# Patient Record
Sex: MALE | Race: WHITE | NOT HISPANIC OR LATINO | URBAN - METROPOLITAN AREA
[De-identification: names, ages, dates, MRNs, and addresses within clinical notes are randomized per-mention and may not be internally consistent; named-entity substitution may affect disease eponyms.]

---

## 2023-12-27 ENCOUNTER — HOSPITAL ENCOUNTER (INPATIENT)
Facility: HOSPITAL | Age: 53
LOS: 12 days | Discharge: NURSING, REHAB OR LONG-TERM CARE | End: 2024-01-08
Attending: EMERGENCY MEDICINE | Admitting: INTERNAL MEDICINE
Payer: COMMERCIAL

## 2023-12-27 ENCOUNTER — APPOINTMENT (OUTPATIENT)
Dept: GENERAL RADIOLOGY | Facility: HOSPITAL | Age: 53
End: 2023-12-27
Payer: COMMERCIAL

## 2023-12-27 ENCOUNTER — APPOINTMENT (OUTPATIENT)
Dept: CT IMAGING | Facility: HOSPITAL | Age: 53
End: 2023-12-27
Payer: COMMERCIAL

## 2023-12-27 DIAGNOSIS — I10 UNCONTROLLED HYPERTENSION: ICD-10-CM

## 2023-12-27 DIAGNOSIS — R41.841 COGNITIVE COMMUNICATION DEFICIT: ICD-10-CM

## 2023-12-27 DIAGNOSIS — I62.9 INTRACRANIAL HEMORRHAGE: Primary | ICD-10-CM

## 2023-12-27 DIAGNOSIS — Z91.199 PERSONAL HISTORY OF NONCOMPLIANCE WITH MEDICAL TREATMENT: ICD-10-CM

## 2023-12-27 DIAGNOSIS — R13.12 OROPHARYNGEAL DYSPHAGIA: ICD-10-CM

## 2023-12-27 DIAGNOSIS — G47.00 INSOMNIA, UNSPECIFIED TYPE: ICD-10-CM

## 2023-12-27 PROBLEM — F10.11 HISTORY OF ALCOHOL ABUSE: Status: ACTIVE | Noted: 2023-12-27

## 2023-12-27 PROBLEM — I61.9 ICH (INTRACEREBRAL HEMORRHAGE): Status: ACTIVE | Noted: 2023-12-27

## 2023-12-27 LAB
ALT SERPL W P-5'-P-CCNC: 40 U/L (ref 1–41)
AMPHET+METHAMPHET UR QL: NEGATIVE
AMPHETAMINES UR QL: NEGATIVE
APTT PPP: 31 SECONDS (ref 22–39)
AST SERPL-CCNC: 25 U/L (ref 1–40)
BARBITURATES UR QL SCN: NEGATIVE
BASOPHILS # BLD AUTO: 0.06 10*3/MM3 (ref 0–0.2)
BASOPHILS NFR BLD AUTO: 0.6 % (ref 0–1.5)
BENZODIAZ UR QL SCN: NEGATIVE
BUN BLDA-MCNC: 13 MG/DL (ref 8–26)
BUPRENORPHINE SERPL-MCNC: NEGATIVE NG/ML
CA-I BLDA-SCNC: 1.24 MMOL/L (ref 1.2–1.32)
CANNABINOIDS SERPL QL: NEGATIVE
CHLORIDE BLDA-SCNC: 100 MMOL/L (ref 98–109)
CO2 BLDA-SCNC: 29 MMOL/L (ref 24–29)
COCAINE UR QL: NEGATIVE
CREAT BLDA-MCNC: 1.2 MG/DL (ref 0.6–1.3)
DEPRECATED RDW RBC AUTO: 44.4 FL (ref 37–54)
EGFRCR SERPLBLD CKD-EPI 2021: 72.3 ML/MIN/1.73
EOSINOPHIL # BLD AUTO: 0.25 10*3/MM3 (ref 0–0.4)
EOSINOPHIL NFR BLD AUTO: 2.6 % (ref 0.3–6.2)
ERYTHROCYTE [DISTWIDTH] IN BLOOD BY AUTOMATED COUNT: 13.6 % (ref 12.3–15.4)
ETHANOL BLD-MCNC: <10 MG/DL (ref 0–10)
FENTANYL UR-MCNC: NEGATIVE NG/ML
GLUCOSE BLDC GLUCOMTR-MCNC: 87 MG/DL (ref 70–130)
HCT VFR BLD AUTO: 49.2 % (ref 37.5–51)
HCT VFR BLDA CALC: 50 % (ref 38–51)
HGB BLD-MCNC: 16.3 G/DL (ref 13–17.7)
HGB BLDA-MCNC: 17 G/DL (ref 12–17)
HOLD SPECIMEN: NORMAL
IMM GRANULOCYTES # BLD AUTO: 0.06 10*3/MM3 (ref 0–0.05)
IMM GRANULOCYTES NFR BLD AUTO: 0.6 % (ref 0–0.5)
INR PPP: 1.2 (ref 0.8–1.2)
LYMPHOCYTES # BLD AUTO: 1.5 10*3/MM3 (ref 0.7–3.1)
LYMPHOCYTES NFR BLD AUTO: 15.4 % (ref 19.6–45.3)
MCH RBC QN AUTO: 29.5 PG (ref 26.6–33)
MCHC RBC AUTO-ENTMCNC: 33.1 G/DL (ref 31.5–35.7)
MCV RBC AUTO: 89 FL (ref 79–97)
METHADONE UR QL SCN: NEGATIVE
MONOCYTES # BLD AUTO: 1.25 10*3/MM3 (ref 0.1–0.9)
MONOCYTES NFR BLD AUTO: 12.9 % (ref 5–12)
NEUTROPHILS NFR BLD AUTO: 6.59 10*3/MM3 (ref 1.7–7)
NEUTROPHILS NFR BLD AUTO: 67.9 % (ref 42.7–76)
NRBC BLD AUTO-RTO: 0 /100 WBC (ref 0–0.2)
NT-PROBNP SERPL-MCNC: 322.9 PG/ML (ref 0–900)
OPIATES UR QL: NEGATIVE
OSMOLALITY UR: 141 MOSM/KG (ref 300–1100)
OXYCODONE UR QL SCN: NEGATIVE
PCP UR QL SCN: NEGATIVE
PLATELET # BLD AUTO: 249 10*3/MM3 (ref 140–450)
PMV BLD AUTO: 10.4 FL (ref 6–12)
POTASSIUM BLDA-SCNC: 3.5 MMOL/L (ref 3.5–4.9)
PROTHROMBIN TIME: 13.8 SECONDS (ref 12.8–15.2)
RBC # BLD AUTO: 5.53 10*6/MM3 (ref 4.14–5.8)
SODIUM BLD-SCNC: 141 MMOL/L (ref 138–146)
TRICYCLICS UR QL SCN: NEGATIVE
TROPONIN T SERPL HS-MCNC: 17 NG/L
WBC NRBC COR # BLD AUTO: 9.71 10*3/MM3 (ref 3.4–10.8)
WHOLE BLOOD HOLD COAG: NORMAL
WHOLE BLOOD HOLD SPECIMEN: NORMAL

## 2023-12-27 PROCEDURE — 85014 HEMATOCRIT: CPT

## 2023-12-27 PROCEDURE — 70496 CT ANGIOGRAPHY HEAD: CPT

## 2023-12-27 PROCEDURE — 25010000002 METHYLPREDNISOLONE PER 125 MG: Performed by: EMERGENCY MEDICINE

## 2023-12-27 PROCEDURE — 85610 PROTHROMBIN TIME: CPT

## 2023-12-27 PROCEDURE — 82330 ASSAY OF CALCIUM: CPT | Performed by: INTERNAL MEDICINE

## 2023-12-27 PROCEDURE — 71045 X-RAY EXAM CHEST 1 VIEW: CPT

## 2023-12-27 PROCEDURE — 83935 ASSAY OF URINE OSMOLALITY: CPT

## 2023-12-27 PROCEDURE — 80053 COMPREHEN METABOLIC PANEL: CPT | Performed by: EMERGENCY MEDICINE

## 2023-12-27 PROCEDURE — 99291 CRITICAL CARE FIRST HOUR: CPT | Performed by: INTERNAL MEDICINE

## 2023-12-27 PROCEDURE — 99291 CRITICAL CARE FIRST HOUR: CPT

## 2023-12-27 PROCEDURE — 70498 CT ANGIOGRAPHY NECK: CPT

## 2023-12-27 PROCEDURE — 83036 HEMOGLOBIN GLYCOSYLATED A1C: CPT

## 2023-12-27 PROCEDURE — 99222 1ST HOSP IP/OBS MODERATE 55: CPT

## 2023-12-27 PROCEDURE — 85025 COMPLETE CBC W/AUTO DIFF WBC: CPT | Performed by: EMERGENCY MEDICINE

## 2023-12-27 PROCEDURE — 84484 ASSAY OF TROPONIN QUANT: CPT | Performed by: EMERGENCY MEDICINE

## 2023-12-27 PROCEDURE — 93005 ELECTROCARDIOGRAM TRACING: CPT | Performed by: EMERGENCY MEDICINE

## 2023-12-27 PROCEDURE — 85730 THROMBOPLASTIN TIME PARTIAL: CPT | Performed by: EMERGENCY MEDICINE

## 2023-12-27 PROCEDURE — 25510000001 IOPAMIDOL PER 1 ML: Performed by: EMERGENCY MEDICINE

## 2023-12-27 PROCEDURE — 83735 ASSAY OF MAGNESIUM: CPT | Performed by: NURSE PRACTITIONER

## 2023-12-27 PROCEDURE — 83930 ASSAY OF BLOOD OSMOLALITY: CPT

## 2023-12-27 PROCEDURE — 83880 ASSAY OF NATRIURETIC PEPTIDE: CPT

## 2023-12-27 PROCEDURE — 25010000002 DIPHENHYDRAMINE PER 50 MG: Performed by: EMERGENCY MEDICINE

## 2023-12-27 PROCEDURE — 80047 BASIC METABLC PNL IONIZED CA: CPT

## 2023-12-27 PROCEDURE — 80061 LIPID PANEL: CPT

## 2023-12-27 PROCEDURE — 84100 ASSAY OF PHOSPHORUS: CPT | Performed by: NURSE PRACTITIONER

## 2023-12-27 PROCEDURE — 70450 CT HEAD/BRAIN W/O DYE: CPT

## 2023-12-27 PROCEDURE — 85027 COMPLETE CBC AUTOMATED: CPT | Performed by: NURSE PRACTITIONER

## 2023-12-27 PROCEDURE — 25810000003 SODIUM CHLORIDE 0.9 % SOLUTION 250 ML FLEX CONT: Performed by: EMERGENCY MEDICINE

## 2023-12-27 PROCEDURE — 80307 DRUG TEST PRSMV CHEM ANLYZR: CPT | Performed by: EMERGENCY MEDICINE

## 2023-12-27 PROCEDURE — 82077 ASSAY SPEC XCP UR&BREATH IA: CPT | Performed by: EMERGENCY MEDICINE

## 2023-12-27 PROCEDURE — 36415 COLL VENOUS BLD VENIPUNCTURE: CPT

## 2023-12-27 RX ORDER — DIPHENHYDRAMINE HYDROCHLORIDE 50 MG/ML
50 INJECTION INTRAMUSCULAR; INTRAVENOUS ONCE
Status: COMPLETED | OUTPATIENT
Start: 2023-12-27 | End: 2023-12-27

## 2023-12-27 RX ORDER — FAMOTIDINE 10 MG/ML
20 INJECTION, SOLUTION INTRAVENOUS ONCE
Status: COMPLETED | OUTPATIENT
Start: 2023-12-27 | End: 2023-12-27

## 2023-12-27 RX ORDER — SODIUM CHLORIDE 0.9 % (FLUSH) 0.9 %
10 SYRINGE (ML) INJECTION AS NEEDED
Status: DISCONTINUED | OUTPATIENT
Start: 2023-12-27 | End: 2023-12-28

## 2023-12-27 RX ORDER — LABETALOL HYDROCHLORIDE 5 MG/ML
20 INJECTION, SOLUTION INTRAVENOUS EVERY 6 HOURS PRN
Status: DISCONTINUED | OUTPATIENT
Start: 2023-12-27 | End: 2024-01-04

## 2023-12-27 RX ORDER — 3% SODIUM CHLORIDE 3 G/100ML
20 INJECTION, SOLUTION INTRAVENOUS CONTINUOUS
Status: DISCONTINUED | OUTPATIENT
Start: 2023-12-28 | End: 2023-12-28

## 2023-12-27 RX ORDER — THIAMINE HYDROCHLORIDE 100 MG/ML
100 INJECTION, SOLUTION INTRAMUSCULAR; INTRAVENOUS DAILY
Status: COMPLETED | OUTPATIENT
Start: 2023-12-28 | End: 2023-12-30

## 2023-12-27 RX ORDER — LABETALOL HYDROCHLORIDE 5 MG/ML
20 INJECTION, SOLUTION INTRAVENOUS ONCE
Status: COMPLETED | OUTPATIENT
Start: 2023-12-27 | End: 2023-12-27

## 2023-12-27 RX ORDER — LISINOPRIL 10 MG/1
10 TABLET ORAL
Status: DISCONTINUED | OUTPATIENT
Start: 2023-12-28 | End: 2023-12-29

## 2023-12-27 RX ORDER — METHYLPREDNISOLONE SODIUM SUCCINATE 125 MG/2ML
125 INJECTION, POWDER, LYOPHILIZED, FOR SOLUTION INTRAMUSCULAR; INTRAVENOUS ONCE
Status: COMPLETED | OUTPATIENT
Start: 2023-12-27 | End: 2023-12-27

## 2023-12-27 RX ORDER — SODIUM CHLORIDE 9 MG/ML
40 INJECTION, SOLUTION INTRAVENOUS AS NEEDED
Status: DISCONTINUED | OUTPATIENT
Start: 2023-12-27 | End: 2024-01-08 | Stop reason: HOSPADM

## 2023-12-27 RX ORDER — SODIUM CHLORIDE 0.9 % (FLUSH) 0.9 %
10 SYRINGE (ML) INJECTION EVERY 12 HOURS SCHEDULED
Status: DISCONTINUED | OUTPATIENT
Start: 2023-12-27 | End: 2023-12-28

## 2023-12-27 RX ORDER — NICOTINE 21 MG/24HR
1 PATCH, TRANSDERMAL 24 HOURS TRANSDERMAL
Status: DISCONTINUED | OUTPATIENT
Start: 2023-12-28 | End: 2024-01-08 | Stop reason: HOSPADM

## 2023-12-27 RX ORDER — FAMOTIDINE 10 MG/ML
INJECTION, SOLUTION INTRAVENOUS
Status: COMPLETED
Start: 2023-12-27 | End: 2023-12-27

## 2023-12-27 RX ADMIN — Medication 10 ML: at 23:01

## 2023-12-27 RX ADMIN — IOPAMIDOL 75 ML: 755 INJECTION, SOLUTION INTRAVENOUS at 21:24

## 2023-12-27 RX ADMIN — Medication 20 MG: at 21:41

## 2023-12-27 RX ADMIN — METHYLPREDNISOLONE SODIUM SUCCINATE 125 MG: 125 INJECTION INTRAMUSCULAR; INTRAVENOUS at 21:40

## 2023-12-27 RX ADMIN — NICARDIPINE HYDROCHLORIDE 5 MG/HR: 25 INJECTION, SOLUTION INTRAVENOUS at 21:40

## 2023-12-27 RX ADMIN — DIPHENHYDRAMINE HYDROCHLORIDE 50 MG: 50 INJECTION INTRAMUSCULAR; INTRAVENOUS at 21:40

## 2023-12-27 RX ADMIN — FAMOTIDINE 20 MG: 10 INJECTION, SOLUTION INTRAVENOUS at 21:39

## 2023-12-27 NOTE — LETTER
EMS Transport Request  For use at [facility], [place], [place], [place], and [place] only   Patient Name: Chano Rees : 1970   Weight:112 kg (247 lb 6.4 oz) Pick-up Location: [facility] BLS/ALS: BLS/ALS: BLS   Insurance: [employer] EXCHANGE Auth End Date:    Pre-Cert #: D/C Summary complete:    Destination: Other [facility]   Contact Precautions: None   Equipment (O2, Fluids, etc.): None   Arrive By Date/Time: 2024 ASAP, [facility] wants early Stretcher/WC: Stretcher   CM Requesting: Monica Vick RN Ext: [phone]   Notes/Medical Necessity: 2 person max assist, weakness, pain, fall risk     ______________________________________________________________________    *Only 2 patient bags OR 1 carry-on size bag are permitted.  Wheelchairs and walkers CANNOT transported with the patient. Acknowledge: Yes

## 2023-12-28 ENCOUNTER — APPOINTMENT (OUTPATIENT)
Dept: CT IMAGING | Facility: HOSPITAL | Age: 53
End: 2023-12-28
Payer: COMMERCIAL

## 2023-12-28 ENCOUNTER — APPOINTMENT (OUTPATIENT)
Dept: CARDIOLOGY | Facility: HOSPITAL | Age: 53
End: 2023-12-28
Payer: COMMERCIAL

## 2023-12-28 ENCOUNTER — APPOINTMENT (OUTPATIENT)
Dept: MRI IMAGING | Facility: HOSPITAL | Age: 53
End: 2023-12-28
Payer: COMMERCIAL

## 2023-12-28 LAB
ALBUMIN SERPL-MCNC: 4.3 G/DL (ref 3.5–5.2)
ALBUMIN/GLOB SERPL: 1.1 G/DL
ALP SERPL-CCNC: 106 U/L (ref 39–117)
ALT SERPL W P-5'-P-CCNC: 41 U/L (ref 1–41)
ANION GAP SERPL CALCULATED.3IONS-SCNC: 13 MMOL/L (ref 5–15)
ANION GAP SERPL CALCULATED.3IONS-SCNC: 13 MMOL/L (ref 5–15)
ANION GAP SERPL CALCULATED.3IONS-SCNC: 17 MMOL/L (ref 5–15)
ANION GAP SERPL CALCULATED.3IONS-SCNC: 17 MMOL/L (ref 5–15)
AST SERPL-CCNC: 28 U/L (ref 1–40)
BH CV ECHO MEAS - AO MAX PG: 10.2 MMHG
BH CV ECHO MEAS - AO MEAN PG: 5 MMHG
BH CV ECHO MEAS - AO ROOT DIAM: 3.7 CM
BH CV ECHO MEAS - AO V2 MAX: 160 CM/SEC
BH CV ECHO MEAS - AO V2 VTI: 30.6 CM
BH CV ECHO MEAS - AVA(I,D): 2.2 CM2
BH CV ECHO MEAS - EDV(CUBED): 175.6 ML
BH CV ECHO MEAS - EDV(MOD-SP2): 153 ML
BH CV ECHO MEAS - EDV(MOD-SP4): 158 ML
BH CV ECHO MEAS - EF(MOD-BP): 55.6 %
BH CV ECHO MEAS - EF(MOD-SP2): 50.5 %
BH CV ECHO MEAS - EF(MOD-SP4): 62.3 %
BH CV ECHO MEAS - ESV(CUBED): 54.9 ML
BH CV ECHO MEAS - ESV(MOD-SP2): 75.7 ML
BH CV ECHO MEAS - ESV(MOD-SP4): 59.5 ML
BH CV ECHO MEAS - FS: 32.1 %
BH CV ECHO MEAS - IVS/LVPW: 1 CM
BH CV ECHO MEAS - IVSD: 1.1 CM
BH CV ECHO MEAS - LA DIMENSION: 3.6 CM
BH CV ECHO MEAS - LAT PEAK E' VEL: 13.6 CM/SEC
BH CV ECHO MEAS - LV MASS(C)D: 249.3 GRAMS
BH CV ECHO MEAS - LV MAX PG: 3.4 MMHG
BH CV ECHO MEAS - LV MEAN PG: 2 MMHG
BH CV ECHO MEAS - LV V1 MAX: 92.2 CM/SEC
BH CV ECHO MEAS - LV V1 VTI: 17.7 CM
BH CV ECHO MEAS - LVIDD: 5.6 CM
BH CV ECHO MEAS - LVIDS: 3.8 CM
BH CV ECHO MEAS - LVOT AREA: 3.8 CM2
BH CV ECHO MEAS - LVOT DIAM: 2.2 CM
BH CV ECHO MEAS - LVPWD: 1.1 CM
BH CV ECHO MEAS - MED PEAK E' VEL: 18.5 CM/SEC
BH CV ECHO MEAS - MV A MAX VEL: 143 CM/SEC
BH CV ECHO MEAS - MV DEC SLOPE: 556 CM/SEC2
BH CV ECHO MEAS - MV DEC TIME: 0.16 SEC
BH CV ECHO MEAS - MV E MAX VEL: 103 CM/SEC
BH CV ECHO MEAS - MV E/A: 0.72
BH CV ECHO MEAS - MV MAX PG: 10.3 MMHG
BH CV ECHO MEAS - MV MEAN PG: 4.7 MMHG
BH CV ECHO MEAS - MV P1/2T: 60.6 MSEC
BH CV ECHO MEAS - MV V2 VTI: 32.8 CM
BH CV ECHO MEAS - MVA(P1/2T): 3.6 CM2
BH CV ECHO MEAS - MVA(VTI): 2.05 CM2
BH CV ECHO MEAS - PA ACC TIME: 0.12 SEC
BH CV ECHO MEAS - PA V2 MAX: 140 CM/SEC
BH CV ECHO MEAS - SV(LVOT): 67.3 ML
BH CV ECHO MEAS - SV(MOD-SP2): 77.3 ML
BH CV ECHO MEAS - SV(MOD-SP4): 98.5 ML
BH CV ECHO MEAS - TAPSE (>1.6): 2.31 CM
BH CV ECHO MEASUREMENTS AVERAGE E/E' RATIO: 6.42
BH CV ECHO SHUNT ASSESSMENT PERFORMED (HIDDEN SCRIPTING): 1
BH CV VAS BP LEFT ARM: NORMAL MMHG
BH CV XLRA - TDI S': 23.8 CM/SEC
BILIRUB SERPL-MCNC: 0.5 MG/DL (ref 0–1.2)
BUN SERPL-MCNC: 12 MG/DL (ref 6–20)
BUN SERPL-MCNC: 12 MG/DL (ref 6–20)
BUN SERPL-MCNC: 17 MG/DL (ref 6–20)
BUN SERPL-MCNC: 21 MG/DL (ref 6–20)
BUN/CREAT SERPL: 11.8 (ref 7–25)
BUN/CREAT SERPL: 13.7 (ref 7–25)
BUN/CREAT SERPL: 9.4 (ref 7–25)
BUN/CREAT SERPL: 9.4 (ref 7–25)
CA-I SERPL ISE-MCNC: 1.33 MMOL/L (ref 1.12–1.32)
CALCIUM SPEC-SCNC: 9.6 MG/DL (ref 8.6–10.5)
CALCIUM SPEC-SCNC: 9.7 MG/DL (ref 8.6–10.5)
CALCIUM SPEC-SCNC: 9.8 MG/DL (ref 8.6–10.5)
CALCIUM SPEC-SCNC: 9.8 MG/DL (ref 8.6–10.5)
CHLORIDE SERPL-SCNC: 104 MMOL/L (ref 98–107)
CHLORIDE SERPL-SCNC: 104 MMOL/L (ref 98–107)
CHLORIDE SERPL-SCNC: 105 MMOL/L (ref 98–107)
CHLORIDE SERPL-SCNC: 107 MMOL/L (ref 98–107)
CHOLEST SERPL-MCNC: 203 MG/DL (ref 0–200)
CO2 SERPL-SCNC: 17 MMOL/L (ref 22–29)
CO2 SERPL-SCNC: 18 MMOL/L (ref 22–29)
CO2 SERPL-SCNC: 22 MMOL/L (ref 22–29)
CO2 SERPL-SCNC: 22 MMOL/L (ref 22–29)
CREAT SERPL-MCNC: 1.28 MG/DL (ref 0.76–1.27)
CREAT SERPL-MCNC: 1.28 MG/DL (ref 0.76–1.27)
CREAT SERPL-MCNC: 1.44 MG/DL (ref 0.76–1.27)
CREAT SERPL-MCNC: 1.53 MG/DL (ref 0.76–1.27)
DEPRECATED RDW RBC AUTO: 43 FL (ref 37–54)
EGFRCR SERPLBLD CKD-EPI 2021: 54 ML/MIN/1.73
EGFRCR SERPLBLD CKD-EPI 2021: 58.1 ML/MIN/1.73
EGFRCR SERPLBLD CKD-EPI 2021: 66.9 ML/MIN/1.73
EGFRCR SERPLBLD CKD-EPI 2021: 66.9 ML/MIN/1.73
ERYTHROCYTE [DISTWIDTH] IN BLOOD BY AUTOMATED COUNT: 13.6 % (ref 12.3–15.4)
GLOBULIN UR ELPH-MCNC: 4 GM/DL
GLUCOSE BLDC GLUCOMTR-MCNC: 119 MG/DL (ref 70–130)
GLUCOSE BLDC GLUCOMTR-MCNC: 133 MG/DL (ref 70–130)
GLUCOSE BLDC GLUCOMTR-MCNC: 135 MG/DL (ref 70–130)
GLUCOSE BLDC GLUCOMTR-MCNC: 148 MG/DL (ref 70–130)
GLUCOSE BLDC GLUCOMTR-MCNC: 149 MG/DL (ref 70–130)
GLUCOSE SERPL-MCNC: 133 MG/DL (ref 65–99)
GLUCOSE SERPL-MCNC: 133 MG/DL (ref 65–99)
GLUCOSE SERPL-MCNC: 163 MG/DL (ref 65–99)
GLUCOSE SERPL-MCNC: 165 MG/DL (ref 65–99)
HBA1C MFR BLD: 5.3 % (ref 4.8–5.6)
HCT VFR BLD AUTO: 50 % (ref 37.5–51)
HDLC SERPL-MCNC: 38 MG/DL (ref 40–60)
HGB BLD-MCNC: 17.1 G/DL (ref 13–17.7)
LDLC SERPL CALC-MCNC: 143 MG/DL (ref 0–100)
LDLC/HDLC SERPL: 3.71 {RATIO}
LEFT ATRIUM VOLUME INDEX: 22 ML/M2
MAGNESIUM SERPL-MCNC: 2.3 MG/DL (ref 1.6–2.6)
MCH RBC QN AUTO: 29.7 PG (ref 26.6–33)
MCHC RBC AUTO-ENTMCNC: 34.2 G/DL (ref 31.5–35.7)
MCV RBC AUTO: 87 FL (ref 79–97)
OSMOLALITY SERPL: 307 MOSM/KG (ref 275–295)
OSMOLALITY SERPL: 310 MOSM/KG (ref 275–295)
OSMOLALITY SERPL: 311 MOSM/KG (ref 275–295)
OSMOLALITY SERPL: 312 MOSM/KG (ref 275–295)
PHOSPHATE SERPL-MCNC: 1.5 MG/DL (ref 2.5–4.5)
PHOSPHATE SERPL-MCNC: 3.6 MG/DL (ref 2.5–4.5)
PLATELET # BLD AUTO: 272 10*3/MM3 (ref 140–450)
PMV BLD AUTO: 10.3 FL (ref 6–12)
POTASSIUM SERPL-SCNC: 4 MMOL/L (ref 3.5–5.2)
POTASSIUM SERPL-SCNC: 4 MMOL/L (ref 3.5–5.2)
POTASSIUM SERPL-SCNC: 4.3 MMOL/L (ref 3.5–5.2)
POTASSIUM SERPL-SCNC: 4.6 MMOL/L (ref 3.5–5.2)
PROT SERPL-MCNC: 8.3 G/DL (ref 6–8.5)
QT INTERVAL: 430 MS
QTC INTERVAL: 517 MS
RBC # BLD AUTO: 5.75 10*6/MM3 (ref 4.14–5.8)
SODIUM SERPL-SCNC: 139 MMOL/L (ref 136–145)
SODIUM SERPL-SCNC: 142 MMOL/L (ref 136–145)
SODIUM SERPL-SCNC: 142 MMOL/L (ref 136–145)
TRIGL SERPL-MCNC: 121 MG/DL (ref 0–150)
VLDLC SERPL-MCNC: 22 MG/DL (ref 5–40)
WBC NRBC COR # BLD AUTO: 11.19 10*3/MM3 (ref 3.4–10.8)

## 2023-12-28 PROCEDURE — 82948 REAGENT STRIP/BLOOD GLUCOSE: CPT

## 2023-12-28 PROCEDURE — 70450 CT HEAD/BRAIN W/O DYE: CPT

## 2023-12-28 PROCEDURE — 70551 MRI BRAIN STEM W/O DYE: CPT

## 2023-12-28 PROCEDURE — 92610 EVALUATE SWALLOWING FUNCTION: CPT

## 2023-12-28 PROCEDURE — 83930 ASSAY OF BLOOD OSMOLALITY: CPT

## 2023-12-28 PROCEDURE — 99233 SBSQ HOSP IP/OBS HIGH 50: CPT | Performed by: PSYCHIATRY & NEUROLOGY

## 2023-12-28 PROCEDURE — 25010000002 THIAMINE PER 100 MG: Performed by: INTERNAL MEDICINE

## 2023-12-28 PROCEDURE — 84295 ASSAY OF SERUM SODIUM: CPT

## 2023-12-28 PROCEDURE — 99253 IP/OBS CNSLTJ NEW/EST LOW 45: CPT | Performed by: PHYSICIAN ASSISTANT

## 2023-12-28 PROCEDURE — 25010000002 PHYTONADIONE 10 MG/ML SOLUTION 1 ML AMPULE: Performed by: PSYCHIATRY & NEUROLOGY

## 2023-12-28 PROCEDURE — 97166 OT EVAL MOD COMPLEX 45 MIN: CPT

## 2023-12-28 PROCEDURE — C1751 CATH, INF, PER/CENT/MIDLINE: HCPCS

## 2023-12-28 PROCEDURE — 84100 ASSAY OF PHOSPHORUS: CPT | Performed by: NURSE PRACTITIONER

## 2023-12-28 PROCEDURE — C1894 INTRO/SHEATH, NON-LASER: HCPCS

## 2023-12-28 PROCEDURE — 93306 TTE W/DOPPLER COMPLETE: CPT | Performed by: INTERNAL MEDICINE

## 2023-12-28 PROCEDURE — 97162 PT EVAL MOD COMPLEX 30 MIN: CPT

## 2023-12-28 PROCEDURE — 99232 SBSQ HOSP IP/OBS MODERATE 35: CPT | Performed by: INTERNAL MEDICINE

## 2023-12-28 PROCEDURE — 25810000003 SODIUM CHLORIDE 0.9 % SOLUTION 250 ML FLEX CONT: Performed by: EMERGENCY MEDICINE

## 2023-12-28 PROCEDURE — 25810000003 SODIUM CHLORIDE 3 % SOLUTION

## 2023-12-28 PROCEDURE — 93306 TTE W/DOPPLER COMPLETE: CPT

## 2023-12-28 PROCEDURE — 80048 BASIC METABOLIC PNL TOTAL CA: CPT

## 2023-12-28 PROCEDURE — 92523 SPEECH SOUND LANG COMPREHEN: CPT

## 2023-12-28 PROCEDURE — 25810000003 SODIUM CHLORIDE 0.9 % SOLUTION: Performed by: NURSE PRACTITIONER

## 2023-12-28 PROCEDURE — 05HY33Z INSERTION OF INFUSION DEVICE INTO UPPER VEIN, PERCUTANEOUS APPROACH: ICD-10-PCS

## 2023-12-28 RX ORDER — LORAZEPAM 1 MG/1
2 TABLET ORAL EVERY 6 HOURS
Status: COMPLETED | OUTPATIENT
Start: 2023-12-28 | End: 2023-12-29

## 2023-12-28 RX ORDER — MIDAZOLAM HYDROCHLORIDE 1 MG/ML
2 INJECTION INTRAMUSCULAR; INTRAVENOUS
Status: DISCONTINUED | OUTPATIENT
Start: 2023-12-28 | End: 2023-12-29

## 2023-12-28 RX ORDER — LORAZEPAM 1 MG/1
2 TABLET ORAL
Status: DISCONTINUED | OUTPATIENT
Start: 2023-12-28 | End: 2023-12-29

## 2023-12-28 RX ORDER — LORAZEPAM 1 MG/1
1 TABLET ORAL
Status: DISCONTINUED | OUTPATIENT
Start: 2023-12-28 | End: 2023-12-29

## 2023-12-28 RX ORDER — LORAZEPAM 1 MG/1
1 TABLET ORAL EVERY 6 HOURS
Status: DISCONTINUED | OUTPATIENT
Start: 2023-12-29 | End: 2023-12-29

## 2023-12-28 RX ORDER — SODIUM CHLORIDE 0.9 % (FLUSH) 0.9 %
10 SYRINGE (ML) INJECTION AS NEEDED
Status: DISCONTINUED | OUTPATIENT
Start: 2023-12-28 | End: 2024-01-08 | Stop reason: HOSPADM

## 2023-12-28 RX ORDER — SODIUM CHLORIDE 0.9 % (FLUSH) 0.9 %
20 SYRINGE (ML) INJECTION AS NEEDED
Status: DISCONTINUED | OUTPATIENT
Start: 2023-12-28 | End: 2024-01-01

## 2023-12-28 RX ORDER — SODIUM CHLORIDE 0.9 % (FLUSH) 0.9 %
10 SYRINGE (ML) INJECTION EVERY 12 HOURS SCHEDULED
Status: DISCONTINUED | OUTPATIENT
Start: 2023-12-28 | End: 2024-01-08 | Stop reason: HOSPADM

## 2023-12-28 RX ORDER — FENTANYL/ROPIVACAINE/NS/PF 2-625MCG/1
15 PLASTIC BAG, INJECTION (ML) EPIDURAL
Status: COMPLETED | OUTPATIENT
Start: 2023-12-28 | End: 2023-12-28

## 2023-12-28 RX ADMIN — NICARDIPINE HYDROCHLORIDE 5 MG/HR: 25 INJECTION, SOLUTION INTRAVENOUS at 18:48

## 2023-12-28 RX ADMIN — LORAZEPAM 2 MG: 1 TABLET ORAL at 17:59

## 2023-12-28 RX ADMIN — LORAZEPAM 2 MG: 1 TABLET ORAL at 11:15

## 2023-12-28 RX ADMIN — Medication 10 ML: at 20:07

## 2023-12-28 RX ADMIN — NICARDIPINE HYDROCHLORIDE 10 MG/HR: 25 INJECTION, SOLUTION INTRAVENOUS at 15:53

## 2023-12-28 RX ADMIN — SODIUM CHLORIDE 20 ML/HR: 3 INJECTION, SOLUTION INTRAVENOUS at 18:48

## 2023-12-28 RX ADMIN — THIAMINE HYDROCHLORIDE 100 MG: 100 INJECTION, SOLUTION INTRAMUSCULAR; INTRAVENOUS at 08:40

## 2023-12-28 RX ADMIN — LABETALOL HYDROCHLORIDE 20 MG: 5 INJECTION INTRAVENOUS at 00:53

## 2023-12-28 RX ADMIN — Medication 10 ML: at 11:16

## 2023-12-28 RX ADMIN — NICARDIPINE HYDROCHLORIDE 10 MG/HR: 25 INJECTION, SOLUTION INTRAVENOUS at 04:08

## 2023-12-28 RX ADMIN — Medication 1 TABLET: at 11:16

## 2023-12-28 RX ADMIN — FOLIC ACID 1 MG: 5 INJECTION, SOLUTION INTRAMUSCULAR; INTRAVENOUS; SUBCUTANEOUS at 08:40

## 2023-12-28 RX ADMIN — METOPROLOL TARTRATE 25 MG: 25 TABLET, FILM COATED ORAL at 20:06

## 2023-12-28 RX ADMIN — NICARDIPINE HYDROCHLORIDE 15 MG/HR: 25 INJECTION, SOLUTION INTRAVENOUS at 00:18

## 2023-12-28 RX ADMIN — NICARDIPINE HYDROCHLORIDE 10 MG/HR: 25 INJECTION, SOLUTION INTRAVENOUS at 06:17

## 2023-12-28 RX ADMIN — LISINOPRIL 10 MG: 10 TABLET ORAL at 11:14

## 2023-12-28 RX ADMIN — POTASSIUM PHOSPHATE, MONOBASIC POTASSIUM PHOSPHATE, DIBASIC 15 MMOL: 224; 236 INJECTION, SOLUTION, CONCENTRATE INTRAVENOUS at 05:32

## 2023-12-28 RX ADMIN — NICARDIPINE HYDROCHLORIDE 10 MG/HR: 25 INJECTION, SOLUTION INTRAVENOUS at 09:34

## 2023-12-28 RX ADMIN — Medication 10 ML: at 08:40

## 2023-12-28 RX ADMIN — PHYTONADIONE 10 MG: 10 INJECTION, EMULSION INTRAMUSCULAR; INTRAVENOUS; SUBCUTANEOUS at 00:01

## 2023-12-28 RX ADMIN — SODIUM CHLORIDE 20 ML/HR: 3 INJECTION, SOLUTION INTRAVENOUS at 00:01

## 2023-12-28 RX ADMIN — NICARDIPINE HYDROCHLORIDE 15 MG/HR: 25 INJECTION, SOLUTION INTRAVENOUS at 02:02

## 2023-12-28 RX ADMIN — NICARDIPINE HYDROCHLORIDE 10 MG/HR: 25 INJECTION, SOLUTION INTRAVENOUS at 13:34

## 2023-12-28 RX ADMIN — Medication 1 PATCH: at 08:40

## 2023-12-28 RX ADMIN — POTASSIUM PHOSPHATE, MONOBASIC POTASSIUM PHOSPHATE, DIBASIC 15 MMOL: 224; 236 INJECTION, SOLUTION, CONCENTRATE INTRAVENOUS at 02:06

## 2023-12-28 NOTE — CONSULTS
Diabetes Education    Patient Name:  Chano Rees  YOB: 1970  MRN: [id number]  Admit Date:  12/27/2023        Order criteria not met for diabetes education consult. Current A1c is 5.3, noted during chart review. Pt has no history of DM and no home meds for DM. Thank you.      Electronically signed by:  Laquita Landa RN  12/28/23 08:04 EST

## 2023-12-28 NOTE — THERAPY EVALUATION
Patient Name: Chano Rees  : 1970    MRN: [id number]                              Today's Date: 2023       Admit Date: 2023    Visit Dx:     ICD-10-CM ICD-9-CM   1. Intracranial hemorrhage  I62.9 432.9   2. Uncontrolled hypertension  I10 401.9   3. Personal history of noncompliance with medical treatment  Z91.199 V15.81   4. Cognitive communication deficit  R41.841 799.52     Patient Active Problem List   Diagnosis    Rt Thalamic ICH    Hypertension    Non-compliance    History of alcohol abuse     Past Medical History:   Diagnosis Date    Hypertension      Past Surgical History:   Procedure Laterality Date    ANKLE SURGERY Left       General Information       Row Name 23 142          OT Time and Intention    Document Type evaluation  -AN     Mode of Treatment occupational therapy  -AN       Row Name 23 [facility]          General Information    Patient Profile Reviewed yes  -AN     Prior Level of Function independent:;all household mobility;community mobility;gait;work;ADL's  Pt works full time as a   -AN     Existing Precautions/Restrictions fall;other (see comments)  L neglect, L sided weakness (UE>LE), L visual deficit, poor safety awareness  -AN     Barriers to Rehab medically complex;cognitive status;visual deficit  -AN       Row Name 23 142          Living Environment    People in Home significant other  -AN       Row Name 23 142          Home Main Entrance    Number of Stairs, Main Entrance none  -AN       Row Name 23 142          Stairs Within Home, Primary    Number of Stairs, Within Home, Primary none  -AN       Row Name 23 142          Cognition    Orientation Status (Cognition) oriented x 4  -AN       Row Name 23 142          Safety Issues, Functional Mobility    Safety Issues Affecting Function (Mobility) safety precautions follow-through/compliance;sequencing abilities;safety precaution awareness;impulsivity;awareness of  need for assistance;ability to follow commands;insight into deficits/self-awareness;judgment;problem-solving  -AN     Impairments Affecting Function (Mobility) balance;cognition;coordination;endurance/activity tolerance;motor control;motor planning;postural/trunk control;range of motion (ROM);strength;visual/perceptual  -AN     Cognitive Impairments, Mobility Safety/Performance awareness, need for assistance;attention;problem-solving/reasoning;safety precaution awareness;impulsivity;insight into deficits/self-awareness;safety precaution follow-through;judgment;sequencing abilities  -AN     Comment, Safety Issues/Impairments (Mobility) Pt unaware of L sided deficits and impulsive  -AN               User Key  (r) = Recorded By, (t) = Taken By, (c) = Cosigned By      Initials Name Provider Type    Princess Nolan OT Occupational Therapist                     Mobility/ADL's       Row Name 12/28/23 1504          Bed Mobility    Bed Mobility supine-sit  -AN     Supine-Sit [place] (Bed Mobility) verbal cues;moderate assist (50% patient effort);2 person assist;nonverbal cues (demo/gesture)  -AN     Bed Mobility, Safety Issues cognitive deficits limit understanding;decreased use of arms for pushing/pulling;impaired trunk control for bed mobility  -AN     Assistive Device (Bed Mobility) head of bed elevated;bed rails  -AN     Comment, (Bed Mobility) Pt required assist at trunk and BLE with cues for sequencing of steps. Pt demo'd strong posterior/L lean once sitting EOB requiring max cues to correct  -AN       Row Name 12/28/23 1504          Transfers    Transfers sit-stand transfer;stand-sit transfer  -AN     Comment, (Transfers) Pt attempting to stand prior to instruction, requiring b/l knee blocking, and max cues to correct L lean. LUE very uncoordinated throughout  -AN       Row Name 12/28/23 1504          Sit-Stand Transfer    Sit-Stand [place] (Transfers) maximum assist (25% patient effort);2 person  assist;verbal cues;nonverbal cues (demo/gesture)  -AN     Assistive Device (Sit-Stand Transfers) other (see comments)  UE support  -AN       Row Name 12/28/23 1504          Stand-Sit Transfer    Stand-Sit [place] (Transfers) verbal cues;nonverbal cues (demo/gesture);maximum assist (25% patient effort);2 person assist  -AN     Assistive Device (Stand-Sit Transfers) other (see comments)  UE support  -AN       Row Name 12/28/23 1504          Functional Mobility    Functional Mobility- Ind. Level unable to perform  -AN       Row Name 12/28/23 1504          Activities of Daily Living    BADL Assessment/Intervention upper body dressing;lower body dressing  -AN       Row Name 12/28/23 1504          Upper Body Dressing Assessment/Training    [place] Level (Upper Body Dressing) don;doff;moderate assist (50% patient effort)  gown  -AN     Position (Upper Body Dressing) supine  -AN     Comment, (Upper Body Dressing) Mod A to insert LUE due to weakness and poor coordination  -AN       Row Name 12/28/23 1504          Lower Body Dressing Assessment/Training    [place] Level (Lower Body Dressing) don;socks;dependent (less than 25% patient effort)  -AN     Position (Lower Body Dressing) sitting up in bed  -AN               User Key  (r) = Recorded By, (t) = Taken By, (c) = Cosigned By      Initials Name Provider Type    Princess Nolan OT Occupational Therapist                   Obj/Interventions       Row Name 12/28/23 1505          Sensory Assessment (Somatosensory)    Sensory Assessment (Somatosensory) UE sensation intact  -AN       Row Name 12/28/23 1500          Vision Assessment/Intervention    Visual Motor Impairment visual tracking, left  -AN     Visual Processing Deficit [place]-inattention/neglect, left  -AN     Vision Assessment Comment Educated pt and family on L neglect and to increase stimuli to L side for improvement  -AN       Row Name 12/28/23 1506          Range of Motion Comprehensive    General  Range of Motion bilateral upper extremity ROM WFL  -AN       Row Name 12/28/23 1507          Strength Comprehensive (MMT)    General Manual Muscle Testing (MMT) Assessment upper extremity strength deficits identified  -AN     Comment, General Manual Muscle Testing (MMT) Assessment L shoulder 3/5, elbow, wrist, and hand 3+/5  -AN       Row Name 12/28/23 1507          Motor Skills    Motor Skills coordination;motor control/coordination interventions  -AN     Coordination fine motor deficit;gross motor deficit;left;upper extremity;moderate impairment  -AN     Motor Control/Coordination Interventions gross motor coordination activities;functional task specific training;fine motor manipulation/dexterity activities;occupation/activity based treatment  -AN       Row Name 12/28/23 1507          Balance    Balance Assessment sitting static balance;sitting dynamic balance;sit to stand dynamic balance;standing static balance  -AN     Static Sitting Balance maximum assist;other (see comments)  with periods of Min A  -AN     Dynamic Sitting Balance verbal cues;maximum assist;1-person assist  -AN     Position, Sitting Balance supported;sitting edge of bed  -AN     Sit to Stand Dynamic Balance verbal cues;non-verbal cues (demo/gesture);maximum assist;2-person assist  -AN     Static Standing Balance verbal cues;non-verbal cues (demo/gesture);maximum assist;2-person assist  -AN     Position/Device Used, Standing Balance supported  -AN     Balance Interventions standing;sit to stand;supported;static;dynamic;highly challenging;occupation based/functional task  -AN               User Key  (r) = Recorded By, (t) = Taken By, (c) = Cosigned By      Initials Name Provider Type    AN Princess Gtz OT Occupational Therapist                   Goals/Plan       Row Name 12/28/23 1518          Bed Mobility Goal 1 (OT)    Activity/Assistive Device (Bed Mobility Goal 1, OT) sit to supine/supine to sit  -AN     [place] Level/Cues Needed  (Bed Mobility Goal 1, OT) minimum assist (75% or more patient effort)  -AN     Time Frame (Bed Mobility Goal 1, OT) long term goal (LTG);10 days  -AN       Row Name 12/28/23 1518          Transfer Goal 1 (OT)    Activity/Assistive Device (Transfer Goal 1, OT) sit-to-stand/stand-to-sit;bed-to-chair/chair-to-bed  -AN     [place] Level/Cues Needed (Transfer Goal 1, OT) moderate assist (50-74% patient effort)  -AN     Time Frame (Transfer Goal 1, OT) long term goal (LTG);10 days  -AN       Row Name 12/28/23 1518          Dressing Goal 1 (OT)    Activity/Device (Dressing Goal 1, OT) upper body dressing  -AN     [place]/Cues Needed (Dressing Goal 1, OT) contact guard required  -AN     Time Frame (Dressing Goal 1, OT) long term goal (LTG);10 days  -AN       Row Name 12/28/23 1518          Grooming Goal 1 (OT)    Activity/Device (Grooming Goal 1, OT) wash face, hands  -AN     [place] (Grooming Goal 1, OT) contact guard required  -AN     Time Frame (Grooming Goal 1, OT) long term goal (LTG);10 days  -AN       Row Name 12/28/23 1518          Therapy Assessment/Plan (OT)    Planned Therapy Interventions (OT) activity tolerance training;adaptive equipment training;BADL retraining;cognitive/visual perception retraining;patient/caregiver education/training;transfer/mobility retraining;functional balance retraining;occupation/activity based interventions;strengthening exercise  -AN               User Key  (r) = Recorded By, (t) = Taken By, (c) = Cosigned By      Initials Name Provider Type    Princess Nolan OT Occupational Therapist                   Clinical Impression       Row Name 12/28/23 1511          Pain Assessment    Pretreatment Pain Rating 0/10 - no pain  -AN     Posttreatment Pain Rating 0/10 - no pain  -AN     Pre/Posttreatment Pain Comment asymptomatic  -AN     Pain Intervention(s) Repositioned;Ambulation/increased activity  -AN       Row Name 12/28/23 1511          Plan of Care Review    Plan  of Care Reviewed With patient;family;significant other  -AN     Progress no change  -AN     Outcome Evaluation Pt presents below his functional baseline with deficits including L sided weakness (LUE>LLE), L neglect, impaired cognition, and impaired mobility and ADLs warranting skilled OT services. Pt verbally inappropriate throughout requiring redirection and attention to task. Pt required Mod A x 2 for bed mobility and STS Max A x 2. Posterior and strong L lean. Rec IPR  at dc.  -AN       Row Name 12/28/23 1511          Therapy Assessment/Plan (OT)    Patient/Family Therapy Goal Statement (OT) Return to PLOF  -AN     Rehab Potential (OT) good, to achieve stated therapy goals  -AN     Criteria for Skilled Therapeutic Interventions Met (OT) yes;skilled treatment is necessary  -AN     Therapy Frequency (OT) daily  -AN       Row Name 12/28/23 1511          Therapy Plan Review/Discharge Plan (OT)    Anticipated Discharge Disposition (OT) inpatient rehabilitation facility  -AN       Row Name 12/28/23 1511          Vital Signs    Pre Systolic BP Rehab 124  -AN     Pre Treatment Diastolic BP 86  -AN     Post Systolic BP Rehab 151  -AN     Post Treatment Diastolic   -AN     O2 Delivery Pre Treatment room air  -AN     O2 Delivery Intra Treatment room air  -AN     O2 Delivery Post Treatment room air  -AN     Pre Patient Position Supine  -AN     Intra Patient Position Standing  -AN     Post Patient Position Sitting  -AN       Row Name 12/28/23 1511          Positioning and Restraints    Pre-Treatment Position in bed  -AN     Post Treatment Position bed  -AN     In Bed notified nsg;sitting EOB;with PT  -AN               User Key  (r) = Recorded By, (t) = Taken By, (c) = Cosigned By      Initials Name Provider Type    Princess Nolan OT Occupational Therapist                   Outcome Measures       Row Name 12/28/23 4337          How much help from another is currently needed...    Putting on and taking off regular  lower body clothing? 1  -AN     Bathing (including washing, rinsing, and drying) 2  -AN     Toileting (which includes using toilet bed pan or urinal) 1  -AN     Putting on and taking off regular upper body clothing 2  -AN     Taking care of personal grooming (such as brushing teeth) 3  -AN     Eating meals 2  -AN     AM-PAC 6 Clicks Score (OT) 11  -AN       Row Name 12/28/23 1034          How much help from another person do you currently need...    Turning from your back to your side while in flat bed without using bedrails? 2  -KG     Moving from lying on back to sitting on the side of a flat bed without bedrails? 2  -KG     Moving to and from a bed to a chair (including a wheelchair)? 2  -KG     Standing up from a chair using your arms (e.g., wheelchair, bedside chair)? 2  -KG     Climbing 3-5 steps with a railing? 1  -KG     To walk in hospital room? 1  -KG     AM-PAC 6 Clicks Score (PT) 10  -KG     Highest Level of Mobility Goal 4 --> Transfer to chair/commode  -KG       Row Name 12/28/23 1520 12/28/23 1034       Modified [place] Scale    Pre-Stroke Modified [place] Scale 6 - Unable to determine (UTD) from the medical record documentation  -AN 6 - Unable to determine (UTD) from the medical record documentation  -KG    Modified [place] Scale 4 - Moderately severe disability.  Unable to walk without assistance, and unable to attend to own bodily needs without assistance.  -AN 4 - Moderately severe disability.  Unable to walk without assistance, and unable to attend to own bodily needs without assistance.  -KG      Row Name 12/28/23 1520 12/28/23 1034       Functional Assessment    Outcome Measure Options AM-PAC 6 Clicks Daily Activity (OT);Modified [place]  -AN AM-PAC 6 Clicks Basic Mobility (PT);Modified [place]  -KG              User Key  (r) = Recorded By, (t) = Taken By, (c) = Cosigned By      Initials Name Provider Type    Katina Lund, PT Physical Therapist    Princess Nolan, OT Occupational  Therapist                    Occupational Therapy Education       Title: PT OT SLP Therapies (In Progress)       Topic: Occupational Therapy (In Progress)       Point: ADL training (Done)       Description:   Instruct learner(s) on proper safety adaptation and remediation techniques during self care or transfers.   Instruct in proper use of assistive devices.                  Learning Progress Summary             Patient Acceptance, E, VU,NR by AN at 12/28/2023 1531   Family Acceptance, E, VU,NR by AN at 12/28/2023 1531                         Point: Home exercise program (Not Started)       Description:   Instruct learner(s) on appropriate technique for monitoring, assisting and/or progressing therapeutic exercises/activities.                  Learner Progress:  Not documented in this visit.              Point: Precautions (Done)       Description:   Instruct learner(s) on prescribed precautions during self-care and functional transfers.                  Learning Progress Summary             Patient Acceptance, E, VU,NR by AN at 12/28/2023 1531   Family Acceptance, E, VU,NR by AN at 12/28/2023 1531                         Point: Body mechanics (Done)       Description:   Instruct learner(s) on proper positioning and spine alignment during self-care, functional mobility activities and/or exercises.                  Learning Progress Summary             Patient Acceptance, E, VU,NR by AN at 12/28/2023 1531   Family Acceptance, E, VU,NR by AN at 12/28/2023 1531                                         User Key       Initials Effective Dates Name Provider Type Discipline     09/21/21 -  Princess Gtz OT Occupational Therapist OT                  OT Recommendation and Plan  Planned Therapy Interventions (OT): activity tolerance training, adaptive equipment training, BADL retraining, cognitive/visual perception retraining, patient/caregiver education/training, transfer/mobility retraining, functional balance  retraining, occupation/activity based interventions, strengthening exercise  Therapy Frequency (OT): daily  Plan of Care Review  Plan of Care Reviewed With: patient, family, significant other  Progress: no change  Outcome Evaluation: Pt presents below his functional baseline with deficits including L sided weakness (LUE>LLE), L neglect, impaired cognition, and impaired mobility and ADLs warranting skilled OT services. Pt verbally inappropriate throughout requiring redirection and attention to task. Pt required Mod A x 2 for bed mobility and STS Max A x 2. Posterior and strong L lean. Rec IPR  at dc.     Time Calculation:   Evaluation Complexity (OT)  Review Occupational Profile/Medical/Therapy History Complexity: expanded/moderate complexity  Assessment, Occupational Performance/Identification of Deficit Complexity: 5 or more performance deficits  Clinical Decision Making Complexity (OT): detailed assessment/moderate complexity  Overall Complexity of Evaluation (OT): moderate complexity     Time Calculation- OT       Row Name 12/28/23 1533             Time Calculation- OT    OT Start Time 0835  -AN      OT Received On 12/28/23  -AN      OT Goal Re-Cert Due Date 01/07/24  -AN         Untimed Charges    OT Eval/Re-eval Minutes 46  -AN         Total Minutes    Untimed Charges Total Minutes 46  -AN       Total Minutes 46  -AN                User Key  (r) = Recorded By, (t) = Taken By, (c) = Cosigned By      Initials Name Provider Type    AN Princess Gtz OT Occupational Therapist                  Therapy Charges for Today       Code Description Service Date Service Provider Modifiers Qty    [id number]  OT EVAL MOD COMPLEXITY 4 12/28/2023 Princess Gtz OT GO 1                 Princess Gtz OT  12/28/2023

## 2023-12-28 NOTE — THERAPY EVALUATION
Patient Name: Chano Rees  : 1970    MRN: [id number]                              Today's Date: 2023       Admit Date: 2023    Visit Dx:     ICD-10-CM ICD-9-CM   1. Intracranial hemorrhage  I62.9 432.9   2. Uncontrolled hypertension  I10 401.9   3. Personal history of noncompliance with medical treatment  Z91.199 V15.81     Patient Active Problem List   Diagnosis    Rt Thalamic ICH    Hypertension    Non-compliance    History of alcohol abuse     Past Medical History:   Diagnosis Date    Hypertension      Past Surgical History:   Procedure Laterality Date    ANKLE SURGERY Left       General Information       Row Name 23 1025          Physical Therapy Time and Intention    Document Type evaluation  -KG     Mode of Treatment physical therapy  -KG       Row Name 23 1025          General Information    Patient Profile Reviewed yes  -KG     Existing Precautions/Restrictions fall;other (see comments)  L sided neglect; L sided weakness (UE>LE); L visual deficit; confusion; impulsive; poor safety awareness  -KG     Barriers to Rehab medically complex;cognitive status;visual deficit  -KG       Row Name 23 1025          Living Environment    People in Home significant other  -KG       Row Name 23 1025          Home Main Entrance    Number of Stairs, Main Entrance none  -KG       Row Name 23 1025          Stairs Within Home, Primary    Number of Stairs, Within Home, Primary none  -KG       Row Name 23 1025          Cognition    Orientation Status (Cognition) oriented x 4  -KG       Row Name 23 1025          Safety Issues, Functional Mobility    Safety Issues Affecting Function (Mobility) ability to follow commands;at risk behavior observed;awareness of need for assistance;impulsivity;insight into deficits/self-awareness;judgment;safety precaution awareness;safety precautions follow-through/compliance;sequencing abilities  -KG     Impairments Affecting Function  (Mobility) balance;cognition;coordination;endurance/activity tolerance;motor control;motor planning;postural/trunk control;range of motion (ROM);strength  -KG     Cognitive Impairments, Mobility Safety/Performance attention;awareness, need for assistance;impulsivity;insight into deficits/self-awareness;safety precaution awareness;safety precaution follow-through;sequencing abilities  -KG     Comment, Safety Issues/Impairments (Mobility) pt with very poor safety awareness; very impulsive; unaware of deficits and L sided neglect  -KG               User Key  (r) = Recorded By, (t) = Taken By, (c) = Cosigned By      Initials Name Provider Type    KG Katina Anderson, PT Physical Therapist                   Mobility       Row Name 12/28/23 1028          Bed Mobility    Bed Mobility sit-supine  -KG     Sit-Supine [place] (Bed Mobility) maximum assist (25% patient effort);2 person assist;verbal cues  -KG     Assistive Device (Bed Mobility) head of bed elevated  -KG     Comment, (Bed Mobility) VC's for sequencing and technique. Pt required assistance at trunk and BLEs. Pt with significant lateral lean to the L when seated EOB; unable to correct despite max verbal and tactile cues.  -KG       Row Name 12/28/23 1028          Transfers    Comment, (Transfers) STS x2 from EOB. VC's for sequencing and technique. Pt very impulsive; attempting to stand prior to instruction. Pt required blocking of lisa knees and B UE support. Upon standing, pt demonstrated significant lateral lean to the L. Pt with very poor coordination. Able to take side steps toward HOB on second stand with maxA x2 and B UE support.  -KG       Row Name 12/28/23 1028          Sit-Stand Transfer    Sit-Stand [place] (Transfers) maximum assist (25% patient effort);2 person assist;verbal cues  -KG     Assistive Device (Sit-Stand Transfers) other (see comments)  B UE support  -KG       Row Name 12/28/23 1028          Gait/Stairs (Locomotion)     [place] Level (Gait) unable to assess  -KG     Comment, (Gait/Stairs) Ambulation deferred due to poor standing balance. Pt with significant lateral lean to the L; unable to correct despite cues. Pt unaware of significant L sided neglect. Pt with L knee buckling with any weight bearing.  -KG               User Key  (r) = Recorded By, (t) = Taken By, (c) = Cosigned By      Initials Name Provider Type    KG Katina Anderson PT Physical Therapist                   Obj/Interventions       Row Name 12/28/23 1031          Range of Motion Comprehensive    General Range of Motion no range of motion deficits identified  -KG     Comment, General Range of Motion B LE WFL  -KG       Row Name 12/28/23 1031          Strength Comprehensive (MMT)    Comment, General Manual Muscle Testing (MMT) Assessment B LE grossly 3+/5  -KG       Row Name 12/28/23 1031          Balance    Balance Assessment sitting static balance;standing static balance;standing dynamic balance  -KG     Static Sitting Balance maximum assist  -KG     Position, Sitting Balance supported;sitting edge of bed  -KG     Static Standing Balance maximum assist;2-person assist  -KG     Dynamic Standing Balance maximum assist;2-person assist  -KG     Position/Device Used, Standing Balance supported  -KG       Row Name 12/28/23 1031          Sensory Assessment (Somatosensory)    Sensory Assessment (Somatosensory) LE sensation intact  -KG               User Key  (r) = Recorded By, (t) = Taken By, (c) = Cosigned By      Initials Name Provider Type    KG Katina Anderson, PT Physical Therapist                   Goals/Plan       Row Name 12/28/23 1033          Bed Mobility Goal 1 (PT)    Activity/Assistive Device (Bed Mobility Goal 1, PT) sit to supine;supine to sit  -KG     [place] Level/Cues Needed (Bed Mobility Goal 1, PT) minimum assist (75% or more patient effort)  -KG     Time Frame (Bed Mobility Goal 1, PT) 2 weeks  -KG     Progress/Outcomes (Bed  Mobility Goal 1, PT) goal ongoing  -KG       Row Name 12/28/23 1033          Transfer Goal 1 (PT)    Activity/Assistive Device (Transfer Goal 1, PT) sit-to-stand/stand-to-sit;bed-to-chair/chair-to-bed  -KG     [place] Level/Cues Needed (Transfer Goal 1, PT) minimum assist (75% or more patient effort)  -KG     Time Frame (Transfer Goal 1, PT) 2 weeks  -KG     Progress/Outcome (Transfer Goal 1, PT) goal ongoing  -KG       Row Name 12/28/23 1033          Gait Training Goal 1 (PT)    Activity/Assistive Device (Gait Training Goal 1, PT) gait (walking locomotion)  -KG     [place] Level (Gait Training Goal 1, PT) moderate assist (50-74% patient effort)  -KG     Distance (Gait Training Goal 1, PT) 25 feet  -KG     Time Frame (Gait Training Goal 1, PT) 2 weeks  -KG     Progress/Outcome (Gait Training Goal 1, PT) goal ongoing  -KG       Row Name 12/28/23 1033          Therapy Assessment/Plan (PT)    Planned Therapy Interventions (PT) balance training;bed mobility training;gait training;strengthening;transfer training  -KG               User Key  (r) = Recorded By, (t) = Taken By, (c) = Cosigned By      Initials Name Provider Type    KG Katina Anderson, PT Physical Therapist                   Clinical Impression       Row Name 12/28/23 1031          Pain    Pretreatment Pain Rating 0/10 - no pain  -KG     Posttreatment Pain Rating 0/10 - no pain  -KG       Row Name 12/28/23 1031          Plan of Care Review    Plan of Care Reviewed With patient  -KG     Outcome Evaluation PT initial evaluation completed for pt s/p R thalamic ICH presenting with L sided weakness (UE>LE), L sided neglect, poor balance and coordination, poor safety awareness, and decreased functional mobility. Pt required maxA x2 for STS transfers and to take side steps toward HOB. Pt's decreased independence warrants PT skilled care. Recommend D/C to  rehab facility.  -KG       Row Name 12/28/23 1031          Therapy Assessment/Plan (PT)     Patient/Family Therapy Goals Statement (PT) return to PLOF  -KG     Rehab Potential (PT) good, to achieve stated therapy goals  -KG     Criteria for Skilled Interventions Met (PT) yes;skilled treatment is necessary  -KG     Therapy Frequency (PT) daily  -KG       Row Name 12/28/23 1031          Vital Signs    Pre Systolic BP Rehab 133  -KG     Pre Treatment Diastolic BP 83  -KG     Post Systolic BP Rehab 153  -KG     Post Treatment Diastolic BP 96  -KG     Pretreatment Heart Rate (beats/min) 101  -KG     Posttreatment Heart Rate (beats/min) 100  -KG     Pre SpO2 (%) 94  -KG     O2 Delivery Pre Treatment room air  -KG     Post SpO2 (%) 94  -KG     O2 Delivery Post Treatment room air  -KG     Pre Patient Position Sitting  -KG     Intra Patient Position Standing  -KG     Post Patient Position Supine  -KG       Row Name 12/28/23 1031          Positioning and Restraints    Pre-Treatment Position in bed  -KG     Post Treatment Position bed  -KG     In Bed notified nsg;supine;call light within reach;encouraged to call for assist;exit alarm on;with family/caregiver;side rails up x3  -KG               User Key  (r) = Recorded By, (t) = Taken By, (c) = Cosigned By      Initials Name Provider Type    KG Katina Anderson, PT Physical Therapist                   Outcome Measures       Row Name 12/28/23 1034 12/28/23 0108       How much help from another person do you currently need...    Turning from your back to your side while in flat bed without using bedrails? 2  -KG 2  -BB    Moving from lying on back to sitting on the side of a flat bed without bedrails? 2  -KG 2  -BB    Moving to and from a bed to a chair (including a wheelchair)? 2  -KG 2  -BB    Standing up from a chair using your arms (e.g., wheelchair, bedside chair)? 2  -KG 2  -BB    Climbing 3-5 steps with a railing? 1  -KG 2  -BB    To walk in hospital room? 1  -KG 2  -BB    AM-PAC 6 Clicks Score (PT) 10  -KG 12  -BB    Highest Level of Mobility Goal 4 -->  Transfer to chair/commode  -KG 4 --> Transfer to chair/commode  -BB      Row Name 12/28/23 1034          Modified Graham Scale    Pre-Stroke Modified Elizabeth Scale 6 - Unable to determine (UTD) from the medical record documentation  -KG     Modified Graham Scale 4 - Moderately severe disability.  Unable to walk without assistance, and unable to attend to own bodily needs without assistance.  -KG       Row Name 12/28/23 1034          Functional Assessment    Outcome Measure Options AM-PAC 6 Clicks Basic Mobility (PT);Modified Graham  -KG               User Key  (r) = Recorded By, (t) = Taken By, (c) = Cosigned By      Initials Name Provider Type    BB Maryse Grayson, RN Registered Nurse    KG Katina Anderson, PT Physical Therapist                                 Physical Therapy Education       Title: PT OT SLP Therapies (In Progress)       Topic: Physical Therapy (In Progress)       Point: Mobility training (In Progress)       Learning Progress Summary             Patient Acceptance, E, NR by KG at 12/28/2023 0845                         Point: Home exercise program (Not Started)       Learner Progress:  Not documented in this visit.              Point: Body mechanics (In Progress)       Learning Progress Summary             Patient Acceptance, E, NR by KG at 12/28/2023 0845                         Point: Precautions (In Progress)       Learning Progress Summary             Patient Acceptance, E, NR by KG at 12/28/2023 0845                                         User Key       Initials Effective Dates Name Provider Type Discipline    KG 05/22/20 -  Katina Anderson, PT Physical Therapist PT                  PT Recommendation and Plan  Planned Therapy Interventions (PT): balance training, bed mobility training, gait training, strengthening, transfer training  Plan of Care Reviewed With: patient  Outcome Evaluation: PT initial evaluation completed for pt s/p R thalamic ICH presenting with L sided weakness  (UE>LE), L sided neglect, poor balance and coordination, poor safety awareness, and decreased functional mobility. Pt required maxA x2 for STS transfers and to take side steps toward HOB. Pt's decreased independence warrants PT skilled care. Recommend D/C to IP rehab facility.     Time Calculation:   PT Evaluation Complexity  History, PT Evaluation Complexity: 3 or more personal factors and/or comorbidities  Examination of Body Systems (PT Eval Complexity): total of 3 or more elements  Clinical Presentation (PT Evaluation Complexity): evolving  Clinical Decision Making (PT Evaluation Complexity): moderate complexity  Overall Complexity (PT Evaluation Complexity): moderate complexity     PT Charges       Row Name 12/28/23 0845             Time Calculation    Start Time 0845  -KG      PT Received On 12/28/23  -KG      PT Goal Re-Cert Due Date 01/07/24  -KG         Untimed Charges    PT Eval/Re-eval Minutes 50  -KG         Total Minutes    Untimed Charges Total Minutes 50  -KG       Total Minutes 50  -KG                User Key  (r) = Recorded By, (t) = Taken By, (c) = Cosigned By      Initials Name Provider Type    KG Katina Anderson, PT Physical Therapist                  Therapy Charges for Today       Code Description Service Date Service Provider Modifiers Qty    57194177677 HC PT EVAL MOD COMPLEXITY 4 12/28/2023 Katina Anderson, PT GP 1            PT G-Codes  Outcome Measure Options: AM-PAC 6 Clicks Basic Mobility (PT), Modified [place]  AM-PAC 6 Clicks Score (PT): 10  Modified Elizabeth Scale: 4 - Moderately severe disability.  Unable to walk without assistance, and unable to attend to own bodily needs without assistance.  PT Discharge Summary  Anticipated Discharge Disposition (PT): inpatient rehabilitation facility    Taylor Anderson, PT  12/28/2023

## 2023-12-28 NOTE — PLAN OF CARE
Goal Outcome Evaluation:              Outcome Evaluation: Completed swallowing evaluation and recommended a PO diet. Completed a speech/language/cognitive evaluation. Needs follow up by ST for swallowing and cognitive communication/speech.  SLP to follow during acute care stay.      Anticipated Discharge Disposition (SLP): inpatient rehabilitation facility

## 2023-12-28 NOTE — PROGRESS NOTES
"INTENSIVIST NOTE     Hospital Day: 1    Mr. Chano Rees, 53 y.o. male is followed for:   ICH       SUBJECTIVE     Interval history:    NIH 16 this morning.  Afebrile.  Fluid balance slightly positive.  Remains on a Cardene drip    Oral antihypertensives started.  CT head without much change this morning.    The patient's relevant past medical, surgical and social history were reviewed and updated in Epic as appropriate.       OBJECTIVE     Vital Sign Min/Max for last 24 hours  Temp  Min: 97.9 °F (36.6 °C)  Max: 98.4 °F (36.9 °C)   BP  Min: 106/57  Max: 226/119   Pulse  Min: 79  Max: 105   Resp  Min: 18  Max: 22   SpO2  Min: 87 %  Max: 99 %   No data recorded   Weight  Min: 125 kg (275 lb)  Max: 126 kg (278 lb 7.1 oz)      Intake/Output Summary (Last 24 hours) at 12/28/2023 1403  Last data filed at 12/28/2023 1300  Gross per 24 hour   Intake 2704.8 ml   Output 2250 ml   Net 454.8 ml      Flowsheet Rows      Flowsheet Row First Filed Value   Admission Height 177.8 cm (70\") Documented at 12/27/2023 2152   Admission Weight 125 kg (275 lb) Documented at 12/27/2023 2152               12/27/23  2152 12/28/23  0101   Weight: 125 kg (275 lb) 126 kg (278 lb 7.1 oz)            Objective:  General Appearance:  In no acute distress.    Vital signs: (most recent): Blood pressure 152/86, pulse 100, temperature 98 °F (36.7 °C), temperature source Oral, resp. rate 18, height 180.3 cm (71\"), weight 126 kg (278 lb 7.1 oz), SpO2 94%.    HEENT: Normal HEENT exam.    Lungs:  Normal effort and normal respiratory rate.  Breath sounds clear to auscultation.  He is not in respiratory distress.  No rales, wheezes or rhonchi.    Heart: Normal rate.  Regular rhythm.  S1 normal and S2 normal.  No murmur, gallop or friction rub.   Chest: Symmetric chest wall expansion.   Abdomen: Abdomen is soft and non-distended.  Bowel sounds are normal.   There is no abdominal tenderness.   There is no mass. There is no splenomegaly. There is no " hepatomegaly.   Extremities: There is no deformity or dependent edema.    Pupils:  Pupils are equal, round, and reactive to light.    Skin:  Warm and dry.            Interval: baseline  1a. Level of Consciousness: 1-->Not alert, but arousable by minor stimulation to obey, answer, or respond  1b. LOC Questions: 0-->Answers both questions correctly  1c. LOC Commands: 0-->Performs both tasks correctly  2. Best Gaze: 1-->Partial gaze palsy, gaze is abnormal in one or both eyes, but forced deviation or total gaze paresis is not present  3. Visual: 1-->Partial hemianopia  4. Facial Palsy: 3-->Complete paralysis of one or both sides (absence of facial movement in the upper and lower face)  5a. Motor Arm, Left: 3-->No effort against gravity, limb falls  5b. Motor Arm, Right: 0-->No drift, limb holds 90 (or 45) degrees for full 10 secs  6a. Motor Leg, Left: 1-->Drift, leg falls by the end of the 5-sec period but does not hit bed  6b. Motor Leg, Right: 0-->No drift, leg holds 30 degree position for full 5 secs  7. Limb Ataxia: 2-->Present in two limbs  8. Sensory: 1-->Mild-to-moderate sensory loss, patient feels pinprick is less sharp or is dull on the affected side, or there is a loss of superficial pain with pinprick, but patient is aware of being touched  9. Best Language: 0-->No aphasia, normal  10. Dysarthria: 1-->Mild-to-moderate dysarthria, patient slurs at least some words and, at worst, can be understood with some difficulty  11. Extinction and Inattention (formerly Neglect): 2-->Profound lambetro-inattention/extinction more than 1 modality    Total (NIH Stroke Scale): 16     I reviewed the patient's new clinical results.  I reviewed the patient's new imaging results/reports including actual images and agree with reports.    CT Head Without Contrast    Result Date: 12/28/2023  Impression: 1. No significant change in right thalamic hemorrhage measuring around 3 cm in diameter with mild right to left midline shift.  Electronically Signed: Tj Tyson MD  12/28/2023 12:12 PM EST  Workstation ID: [id number]    CT Head Without Contrast    Result Date: 12/28/2023  Impression: Very slight increase in the size of the right thalamic hemorrhage 3.2 cm in diameter previously 3 cm with 2 mm of right to left midline shift and a trace amount of posterior horn right lateral ventricle intraventricular hemorrhage. Electronically Signed: Christoph Richardson MD  12/28/2023 5:33 AM EST  Workstation ID: [id number]    XR Chest 1 View    Result Date: 12/27/2023  Impression: No focal consolidation. Enlarged cardiac silhouette and ectatic thoracic aorta. Electronically Signed: Victor M Guevara MD  12/27/2023 10:17 PM EST  Workstation ID: [id number]    CT Angiogram Head w AI Analysis of LVO    Result Date: 12/27/2023  Impression: No evidence of large vessel occlusion, flow-limiting stenosis, dissection, or aneurysm. Electronically Signed: Victor M Guevara MD  12/27/2023 9:46 PM EST  Workstation ID: [id number]    CT Angiogram Neck    Result Date: 12/27/2023  Impression: No evidence of large vessel occlusion, flow-limiting stenosis, dissection, or aneurysm. Electronically Signed: Victor M Guevara MD  12/27/2023 9:46 PM EST  Workstation ID: [id number]    CT Head Without Contrast Stroke Protocol    Result Date: 12/27/2023  Impression: Right thalamic hemorrhage measuring up to 3 cm. Electronically Signed: Victor M Guevara MD  12/27/2023 9:36 PM EST  Workstation ID: [id number]      INFUSIONS  niCARdipine, 5-15 mg/hr, Last Rate: 10 mg/hr (12/28/23 1334)  sodium chloride, 20 mL/hr, Last Rate: 20 mL/hr (12/28/23 0001)        Results from last 7 days   Lab Units 12/27/23  2358 12/27/23  2117 12/27/23  2107   WBC 10*3/mm3 11.19* 9.71  --    HEMOGLOBIN g/dL 17.1 16.3  --    HEMOGLOBIN, POC g/dL  --   --  17.0   HEMATOCRIT % 50.0 49.2  --    HEMATOCRIT POC %  --   --  50   PLATELETS 10*3/mm3 272 249  --      Results from last 7 days   Lab Units 12/28/23  0612 12/27/23  4648 12/27/23  6685    SODIUM mmol/L 139 139  139  --    POTASSIUM mmol/L 4.6 4.0  4.0  --    CHLORIDE mmol/L 105 104  104  --    CO2 mmol/L 17.0* 22.0  22.0  --    BUN mg/dL 17 12  12  --    GLUCOSE mg/dL 165* 133*  133*  --    CREATININE mg/dL 1.44* 1.28*  1.28* 1.20   MAGNESIUM mg/dL  --  2.3  --    CALCIUM mg/dL 9.7 9.8  9.8  --    ALBUMIN g/dL  --  4.3  --                Patient isn't on Tube Feeding   /h  Patient doesn't have any tube feeding modular orders    Mechanical Ventilator:   Settings: Observed:                                                I reviewed the patient's medications.    Assessment & Plan   ASSESSMENT/PLAN     Active Hospital Problems    Diagnosis     **Rt Thalamic ICH     Hypertension     Non-compliance     History of alcohol abuse        53-year-old male with a past medical history significant for hypertension and poor medical compliance.  He presented to the ER on 12/27 with sudden onset of left-sided weakness and headache.  Blood pressure in /139 initially.  CT of the head revealed a 3 cm right thalamic ICH.  CTA was unremarkable and was felt this was a hypertensive bleed.  He was admitted to the ICU for blood pressure control.  He has a history of alcoholism but reportedly has not been drinking recently    CT head this morning without significant change in the right thalamic hemorrhage with some mild midline shift.  Remains on hypertonic saline.  NIH is 16    Plan:    Goal SBP less than 140 mmHg  Cardene drip as needed  Oral Lopressor and lisinopril  3% saline per neurology stroke service  Brain MRI     I discussed the patient's findings and my recommendations with patient and nursing staff     Plan of care and goals reviewed with multidisciplinary team at daily rounds.    High level of risk due to:  Acute illness with threat to life or bodily function.    Edvin Child MD  Pulmonary and Critical Care Medicine  12/28/23 14:03 EST

## 2023-12-28 NOTE — PLAN OF CARE
Goal Outcome Evaluation:  Plan of Care Reviewed With: patient           Outcome Evaluation: PT initial evaluation completed for pt s/p R thalamic ICH presenting with L sided weakness (UE>LE), L sided neglect, poor balance and coordination, poor safety awareness, and decreased functional mobility. Pt required maxA x2 for STS transfers and to take side steps toward HOB. Pt's decreased independence warrants PT skilled care. Recommend D/C to IP rehab facility.      Anticipated Discharge Disposition (PT): inpatient rehabilitation facility

## 2023-12-28 NOTE — ED PROVIDER NOTES
" EMERGENCY DEPARTMENT ENCOUNTER    Pt Name: Chano Rees  MRN: [id number]  Pt :   1970  Room Number:    Date of encounter:  2023  PCP: No primary care provider on file.  ED Provider: Brice Wall MD    Historian: EMS flight crew is well as patient    HPI:  Chief Complaint: Acute onset of left-sided weakness        Context: Chano Rees is a 53 y.o. male who presents to the ED c/o acute onset left-sided weakness which started at 7:05 PM tonight.  The patient was out with his wife when he developed the sudden onset of weakness.  He does report a slight headache \"behind my right eye\".  The patient has been noncompliant with his antihypertensive medication, not taking it for over 6 months per his report.  He believes he is supposed to be taking lisinopril 10 mg.  He notes that his blood pressure is always high but does not know any recent readings.      PAST MEDICAL HISTORY  No past medical history on file.      PAST SURGICAL HISTORY  No past surgical history on file.      FAMILY HISTORY  No family history on file.      SOCIAL HISTORY         ALLERGIES  Patient has no allergy information on record.        REVIEW OF SYSTEMS  Review of Systems       All systems reviewed and negative except for those discussed in HPI.       PHYSICAL EXAM    I have reviewed the triage vital signs and nursing notes.    ED Triage Vitals   Temp Pulse Resp BP SpO2   -- -- -- -- --      Temp src Heart Rate Source Patient Position BP Location FiO2 (%)   -- -- -- -- --       Physical Exam  GENERAL:   Appears in moderate distress with left-sided neglect.  Marked hypertension.  HENT: Nares patent.  No facial droop  EYES: No scleral icterus.  Left visual neglect  CV: Regular rhythm, regular rate.  No murmurs gallops rubs clear to auscultation  RESPIRATORY: Normal effort.  No audible wheezes, rales or rhonchi.  ABDOMEN: Soft, nontender  MUSCULOSKELETAL: No deformities.   NEURO: Left arm and leg weakness with left facial " shana.  See stroke navigator note for detailed NIH stroke score.  SKIN: Warm, dry, no rash visualized.      LAB RESULTS  No results found for this or any previous visit (from the past 24 hour(s)).    If labs were ordered, I independently reviewed the results and considered them in treating the patient.        RADIOLOGY  No Radiology Exams Resulted Within Past 24 Hours    I ordered and independently reviewed the above noted radiographic studies.      I viewed images of CT head which showed right thalamic hemorrhage which is acute per my independent interpretation.    See radiologist's dictation for official interpretation.        PROCEDURES    Critical Care    Performed by: Brice Wall MD  Authorized by: Brice Wall MD    Critical care provider statement:     Critical care time (minutes):  35    Critical care time was exclusive of:  Separately billable procedures and treating other patients    Critical care was necessary to treat or prevent imminent or life-threatening deterioration of the following conditions:  CNS failure or compromise and circulatory failure    Critical care was time spent personally by me on the following activities:  Ordering and performing treatments and interventions, ordering and review of laboratory studies, ordering and review of radiographic studies, pulse oximetry, re-evaluation of patient's condition, review of old charts, obtaining history from patient or surrogate, examination of patient, evaluation of patient's response to treatment, discussions with consultants and development of treatment plan with patient or surrogate      ECG 12 Lead ED Triage Standing Order; Acute Stroke (Onset <24 hrs)   Final Result   Test Reason : ED Triage Standing Order~   Blood Pressure :   */*   mmHG   Vent. Rate :  87 BPM     Atrial Rate :  87 BPM      P-R Int : 188 ms          QRS Dur :  92 ms       QT Int : 430 ms       P-R-T Axes :  50 -24 159 degrees      QTc Int : 517 ms      Normal sinus  rhythm   Minimal voltage criteria for LVH, may be normal variant   Nonspecific T wave abnormality   Prolonged QT   Abnormal ECG   No previous ECGs available   Confirmed by JACQUELYN RALPH MD ([id number]) on 12/28/2023 2:44:38 AM      Referred By: EDMD           Confirmed By: JACQUELYN RALPH MD          MEDICATIONS GIVEN IN ER    Medications   sodium chloride 0.9 % flush 10 mL (has no administration in time range)         MEDICAL DECISION MAKING, PROGRESS, and CONSULTS    All labs, if obtained, have been independently reviewed by me.  All radiology studies, if obtained, have been reviewed by me and the radiologist dictating the report.  All EKG's, if obtained, have been independently viewed and interpreted by me/my attending physician.      Discussion below represents my analysis of pertinent findings related to patient's condition, differential diagnosis, treatment plan and final disposition.                         Differential diagnosis:    Intracranial hemorrhage versus embolic CVA, etc.      Additional sources:    - Discussed/ obtained information from independent historians: EMS helicopter flight crew gave report directly to me at bedside of CT scanner.    - External (non-ED) record review: I queried the epic chart but find no prior records.    - Chronic or social conditions impacting care: Patient reports that he drinks a large amount of whiskey on a daily basis but it is unclear if he is being serious or joking as he repeats this several times to various physicians and nurses.    - Shared decision making: Patient is in agreement with current plans for evaluation and treatment.      Orders placed during this visit:  Orders Placed This Encounter   Procedures    CT Head Without Contrast Stroke Protocol    CT Angiogram Head w AI Analysis of LVO    CT Angiogram Neck    CT CEREBRAL PERFUSION WITH & WITHOUT CONTRAST    XR Chest 1 View    [place] Draw    Single High Sensitivity Troponin T    aPTT    AST    ALT    CBC Auto  Differential    NPO Diet NPO Type: Strict NPO    Initiate Code Stroke    Perform NIH Stroke Scale    Measure Actual Weight    Head of Bed 30 Degrees or Less    Undress and Gown    Continuous Pulse Oximetry    Vital Signs    Neuro Checks    Notify MD for SBP < 80 or > 200    Notify Provider for SBP > 140 if Hemorrhagic Stroke    No Hypotonic Fluids    Nursing Dysphagia Screening (Complete Prior to Giving anything PO)    RN to Place Order SLP Consult (IF swallow screen failed) - Eval & Treat Choosing Reason of RN Dysphagia Screen Failed    Inpatient Neurology Consult Stroke    Oxygen Therapy- Nasal Cannula; Titrate 1-6 LPM Per SpO2; 90 - 95%    POC CHEM 8    POCT Protime/INR    ECG 12 Lead ED Triage Standing Order; Acute Stroke (Onset <24 hrs)    Insert Large-Bore Peripheral IV - Right AC Preferred    CBC & Differential    Green Top (Gel)    Lavender Top    Gold Top - SST    Gray Top    Light Blue Top         Additional orders considered but not ordered:  CT perfusion brain.    ED Course:    Consultants: Neurosurgery.  Intensivist service.    ED Course as of 12/27/23 2145   Wed Dec 27, 2023   2120 Initial blood pressure after arrival was 225/135.  I immediately ordered labetalol 20 mg IV followed by a Cardene drip with hemorrhagic stroke protocol. [MS]   2121 I will admit to the ICU and have paged Dr. Ziegler, pulmonary critical care. [MS]   2134 I spoke with Dr. Zeigler.  Case discussed in detail.  He will admit to the ICU.  I spoke with our stroke navigator who will await the CTA's and then speak with Dr. Murphy of neurosurgery on-call. [MS]      ED Course User Index  [MS] Brice Wall MD              Shared Decision Making:  After my consideration of clinical presentation and any laboratory/radiology studies obtained, I discussed the findings with the patient/patient representative who is in agreement with the treatment plan and the final disposition.   Risks and benefits of discharge and/or  observation/admission were discussed.       AS OF 21:04 EST VITALS:    BP -    HR -    TEMP -    O2 SATS -                    DIAGNOSIS  Final diagnoses:   Intracranial hemorrhage   Uncontrolled hypertension   Personal history of noncompliance with medical treatment         DISPOSITION  Admission to ICU.      Please note that portions of this document were completed with voice recognition software. Left facial droop     Brice Wall MD  12/28/23 [id number]

## 2023-12-28 NOTE — CONSULTS
Stroke Consult Note    Patient Name: Chano Rees   MRN: [id number]  Age: 53 y.o.  Sex: male  : 1970    Primary Care Physician: No primary care provider on file.  Referring Physician:  Dr. Wall    TIME STROKE TEAM CALLED:  EST     TIME PATIENT SEEN: 2102 EST    Handedness: Right  Race: White     Chief Complaint/Reason for Consultation: Left facial droop, speech difficulty and left-sided weakness    HPI: Mr. Rees is a 53-year-old male with PMH of hypertension.  Patient states he has been noncompliant with his blood pressure medications for the past 6 months.  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  Mr. Rees presents with left facial droop, speech difficulty and left-sided weakness.  Flight crew reports patient was eating dinner with his girlfriend when he had acute onset of symptoms.    Initial NIH 16.  Blood pressure 203/139.  On exam patient is able answer questions appropriately and follow one-step commands.  Right gaze preference that does not cross midline.  Patient complains of headache behind right eye.  Peripheral vision loss to left eye.  Left facial droop with mild expressive aphasia and mild dysarthria noted during conversation.  Decrease sensitivity to left-sided body.  Left upper extremity strength 2/5.  Left lower extremity strength 3/5.  Neglect to left-side of body.    Last Known Normal Date/Time: 2023 at 1900 EST     Review of Systems   Constitutional:  Negative for fever.   HENT:  Negative for trouble swallowing and voice change.    Eyes:  Positive for visual disturbance.   Respiratory:  Negative for shortness of breath.    Cardiovascular:  Negative for chest pain.   Gastrointestinal:  Negative for abdominal pain.   Neurological:  Positive for facial asymmetry, speech difficulty, weakness, numbness and headaches.   Psychiatric/Behavioral:  Negative for confusion.       No past medical history on  file.  No past surgical history on file.  No family history on file.     Allergies   Allergen Reactions    Shellfish-Derived Products Hives     Prior to Admission medications    Not on File            Neurological Exam  Mental Status  Alert. Oriented to person, place and time. Oriented to person, place, and time. Mild dysarthria present. Expressive aphasia present.    Cranial Nerves  CN II: Vision test: Right gaze preference that does not cross midline.  Left eye peripheral vision loss.. Right visual acuity: Counts fingers. Right gaze preference that does not cross midline.  Left eye peripheral vision loss..  CN III, IV, VI: Pupils equal round and reactive to light bilaterally.  CN V:  Right: Facial sensation is normal.  Left: Diminished sensation of the entire left side of the face.  CN VII:  Left: There is central facial weakness.  CN VIII: Hearing intact.  CN XI:  Right: Sternocleidomastoid strength is normal. Trapezius strength is normal.  Left: Sternocleidomastoid strength is weak. Trapezius strength is weak.  CN XII: Tongue midline without atrophy or fasciculations.    Motor  Normal muscle bulk throughout. Normal muscle tone. Strength is 5/5 in all four extremities except as noted.  Strength left upper extremity 2/5.  Strength left lower extremity 3/5..    Sensory  Light touch abnormality: Sensation: Decrease sensitivity to left side of body.     Coordination    Unable to assess.    Gait    Unable to assess.      Physical Exam  Constitutional:       General: He is not in acute distress.     Appearance: He is ill-appearing.   HENT:      Head: Normocephalic and atraumatic.      Nose: Nose normal.      Mouth/Throat:      Mouth: Mucous membranes are dry.   Eyes:      Pupils: Pupils are equal, round, and reactive to light.      Comments: Right gaze preference that does not cross midline.  Left eye peripheral vision loss.   Cardiovascular:      Pulses: Normal pulses.   Pulmonary:      Effort: Pulmonary effort is  normal.   Abdominal:      Comments: Round, obese   Musculoskeletal:         General: Normal range of motion.      Cervical back: Normal range of motion.   Skin:     General: Skin is warm and dry.   Neurological:      Mental Status: He is alert and oriented to person, place, and time.      Cranial Nerves: Cranial nerve deficit and dysarthria present.      Sensory: Sensory deficit present.      Motor: Weakness present.   Psychiatric:         Mood and Affect: Mood normal.         Behavior: Behavior normal.         Acute Stroke Data    Thrombolytic Inclusion / Exclusion Criteria    Time: 21:12 EST  Person Administering Scale: AURELIANO Fairbanks    Inclusion Criteria  [x]   18 years of age or greater   [x]   Onset of symptoms < 4.5 hours before beginning treatment (stroke onset = time patient was last seen well or without symptoms).   []   Diagnosis of acute ischemic stroke causing measurable disabling deficit (Complete Hemianopia, Any Aphasia, Visual or Sensory Extinction, Any weakness limiting sustained effort against gravity)   []   Any remaining deficit considered potentially disabling in view of patient and practitioner   Exclusion criteria (Do not proceed with Alteplase if any are checked under exclusion criteria)  []   Onset unknown or GREATER than 4.5 hours   [x]   ICH on CT/MRI   []   CT demonstrates hypodensity representing acute or subacute infarct   []   Significant head trauma or prior stroke in the previous 3 months   []   Symptoms suggestive of subarachnoid hemorrhage   []   History of un-ruptured intracranial aneurysm GREATER than 10 mm   []   Recent intracranial or intraspinal surgery within the last 3 months   []   Arterial puncture at a non-compressible site in the previous 7 days   []   Active internal bleeding   []   Acute bleeding tendency   []   Platelet count LESS than 100,000 for known hematological diseases such as leukemia, thrombocytopenia or chronic cirrhosis   []   Current use of  anticoagulant with INR GREATER than 1.7 or PT GREATER than 15 seconds, aPTT GREATER than 40 seconds   []   Heparin received within 48 hours, resulting in abnormally elevated aPTT GREATER than upper limit of normal   []   Current use of direct thrombin inhibitors or direct factor Xa inhibitors in the past 48 hours   []   Elevated blood pressure refractory to treatment (systolic GREATER than 185 mm/Hg or diastolic  GREATER than 110 mm/Hg   []   Suspected infective endocarditis and aortic arch dissection   []   Current use of therapeutic treatment dose of low-molecular-weight heparin (LMWH) within the previous 24 hours   []   Structural GI malignancy or bleed   Relative exclusion for all patients  []   Only minor non-disabling symptoms   []   Pregnancy   []   Seizure at onset with postictal residual neurological impairments   []   Major surgery or previous trauma within past 14 days   []   History of previous spontaneous ICH, intracranial neoplasm, or AV malformation   []   Postpartum (within previous 14 days)   []   Recent GI or urinary tract hemorrhage (within previous 21 days)   []   Recent acute MI (within previous 3 months)   []   History of un-ruptured intracranial aneurysm LESS than 10 mm   []   History of ruptured intracranial aneurysm   []   Blood glucose LESS than 50 mg/dL (2.7 mmol/L)   []   Dural puncture within the last 7 days   []   Known GREATER than 10 cerebral microbleeds   Additional exclusions for patients with symptoms onset between 3 and 4.5 hours.  []   Age > 80.   []   On any anticoagulants regardless of INR  >>> Warfarin (Coumadin), Heparin, Enoxaparin (Lovenox), fondaparinux (Arixtra), bivalirudin (Angiomax), Argatroban, dabigatran (Pradaxa), rivaroxaban (Xarelto), or apixaban (Eliquis)   []   Severe stroke (NIHSS > 25).   []   History of BOTH diabetes and previous ischemic stroke.   []   The risks and benefits have been discussed with the patient or family related to the administration of IV  thrombolytic therapy for stroke symptoms.   []   I have discussed and reviewed the patient's case and imaging with the attending prior to IV thrombolytic therapy.   NA Time IV thrombolytic administered       Hospital Meds:  Scheduled- labetalol, 20 mg, Intravenous, Once      Infusions- niCARdipine, 5-15 mg/hr       PRNs-   sodium chloride    Functional Status Prior to Current Stroke/[place] Score:   MODIFIED VAHID SCALE (to be assessed for each patient having history of stroke) []Stroke history but not assessed  [x]0: No symptoms at all  []1: No significant disability despite symptoms  []2: Slight disability  []3: Moderate disability  []4: Moderately severe disability  []5: Severe disability  []6: Death        NIH Stroke Scale  Time: 21:12 EST  Person Administering Scale: AURELIANO [place]  Interval: baseline  1a. Level of Consciousness: 0-->Alert, keenly responsive  1b. LOC Questions: 0-->Answers both questions correctly  1c. LOC Commands: 0-->Performs both tasks correctly  2. Best Gaze: 2-->Forced deviation, or total gaze paresis not overcome by the oculocephalic maneuver  3. Visual: 1-->Partial hemianopia  4. Facial Palsy: 2-->Partial paralysis (total or near-total paralysis of lower face)  5a. Motor Arm, Left: 3-->No effort against gravity, limb falls  5b. Motor Arm, Right: 0-->No drift, limb holds 90 (or 45) degrees for full 10 secs  6a. Motor Leg, Left: 2-->Some effort against gravity, leg falls to bed by 5 secs, but has some effort against gravity  6b. Motor Leg, Right: 0-->No drift, leg holds 30 degree position for full 5 secs  7. Limb Ataxia: 0-->Absent  8. Sensory: 2-->Severe to total sensory loss, patient is not aware of being touched in the face, arm, and leg  9. Best Language: 1-->Mild-to-moderate aphasia, some obvious loss of fluency or facility of comprehension, without significant limitation on ideas expressed or form of expression. Reduction of speech and/or comprehension, however, makes  conversation. . . (see row details)  10. Dysarthria: 1-->Mild-to-moderate dysarthria, patient slurs at least some words and, at worst, can be understood with some difficulty  11. Extinction and Inattention (formerly Neglect): 2-->Profound lamberto-inattention/extinction more than 1 modality    Total (NIH Stroke Scale): 16       ICH Score:  0 Points (GCS 13 to 15)  0 Points (ICH volume < 30 cm3)  0 Points (Intraventricular Extension Absent)   0 Points (Infratentorial Origin - No )  0 Points (Age < 80 years)  The total ICS Score for this patient is 0 at 21:25 EST on 12/27/23     Results Reviewed:  I have personally reviewed current lab, radiology, and data and agree with results.    WBC   Date Value Ref Range Status   12/27/2023 9.71 3.40 - 10.80 10*3/mm3 Final     RBC   Date Value Ref Range Status   12/27/2023 5.53 4.14 - 5.80 10*6/mm3 Final     Hemoglobin   Date Value Ref Range Status   12/27/2023 16.3 13.0 - 17.7 g/dL Final   12/27/2023 17.0 12.0 - 17.0 g/dL Final     Comment:     Serial Number: [id number]:  [id number]     Hematocrit   Date Value Ref Range Status   12/27/2023 49.2 37.5 - 51.0 % Final   12/27/2023 50 38 - 51 % Final     MCV   Date Value Ref Range Status   12/27/2023 89.0 79.0 - 97.0 fL Final     MCH   Date Value Ref Range Status   12/27/2023 29.5 26.6 - 33.0 pg Final     MCHC   Date Value Ref Range Status   12/27/2023 33.1 31.5 - 35.7 g/dL Final     RDW   Date Value Ref Range Status   12/27/2023 13.6 12.3 - 15.4 % Final     RDW-SD   Date Value Ref Range Status   12/27/2023 44.4 37.0 - 54.0 fl Final     MPV   Date Value Ref Range Status   12/27/2023 10.4 6.0 - 12.0 fL Final     Platelets   Date Value Ref Range Status   12/27/2023 249 140 - 450 10*3/mm3 Final     Neutrophil %   Date Value Ref Range Status   12/27/2023 67.9 42.7 - 76.0 % Final     Lymphocyte %   Date Value Ref Range Status   12/27/2023 15.4 (L) 19.6 - 45.3 % Final     Monocyte %   Date Value Ref Range Status   12/27/2023 12.9 (H) 5.0 -  12.0 % Final     Eosinophil %   Date Value Ref Range Status   12/27/2023 2.6 0.3 - 6.2 % Final     Basophil %   Date Value Ref Range Status   12/27/2023 0.6 0.0 - 1.5 % Final     Immature Grans %   Date Value Ref Range Status   12/27/2023 0.6 (H) 0.0 - 0.5 % Final     Neutrophils, Absolute   Date Value Ref Range Status   12/27/2023 6.59 1.70 - 7.00 10*3/mm3 Final     Lymphocytes, Absolute   Date Value Ref Range Status   12/27/2023 1.50 0.70 - 3.10 10*3/mm3 Final     Monocytes, Absolute   Date Value Ref Range Status   12/27/2023 1.25 (H) 0.10 - 0.90 10*3/mm3 Final     Eosinophils, Absolute   Date Value Ref Range Status   12/27/2023 0.25 0.00 - 0.40 10*3/mm3 Final     Basophils, Absolute   Date Value Ref Range Status   12/27/2023 0.06 0.00 - 0.20 10*3/mm3 Final     Immature Grans, Absolute   Date Value Ref Range Status   12/27/2023 0.06 (H) 0.00 - 0.05 10*3/mm3 Final     nRBC   Date Value Ref Range Status   12/27/2023 0.0 0.0 - 0.2 /100 WBC Final      Lab Results   Component Value Date    CREATININE 1.20 12/27/2023    EGFR 72.3 12/27/2023    AST 25 12/27/2023    ALT 40 12/27/2023    CT Angiogram Head w AI Analysis of LVO    Result Date: 12/27/2023  Impression: No evidence of large vessel occlusion, flow-limiting stenosis, dissection, or aneurysm. Electronically Signed: Victor M Guevara MD  12/27/2023 9:46 PM EST  Workstation ID: [id number]    CT Angiogram Neck    Result Date: 12/27/2023  Impression: No evidence of large vessel occlusion, flow-limiting stenosis, dissection, or aneurysm. Electronically Signed: Victor M Guevara MD  12/27/2023 9:46 PM EST  Workstation ID: [id number]    CT Head Without Contrast Stroke Protocol    Result Date: 12/27/2023  Impression: Right thalamic hemorrhage measuring up to 3 cm. Electronically Signed: Victor M Guevara MD  12/27/2023 9:36 PM EST  Workstation ID: [id number]           Assessment/Plan:  Mr. Rees is a 53-year-old male with PMH of hypertension.  Patient states he has been noncompliant  with his blood pressure medications for the past 6 months.  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  Mr. Rees presents with left facial droop, speech difficulty and left-sided weakness.  Flight crew reports patient was eating dinner with his girlfriend when he had acute onset of symptoms. Patient is not a candidate for IV thrombolytic therapy due to ICH.  Patient is not a candidate for endovascular therapy due to CT scan results.    Antiplatelet PTA: None  Anticoagulant PTA: None        Right  thalamic hemorrhage  Initiate hemorrhagic order set  N.p.o. until bedside dysphagia screening complete by RN  Strict bedrest  Blood pressure goals, SBP less than 140  Cardene drip as needed for BP management  Neurosurgical consult in a.m.  Repeat CTh in a.m.  Hypertonic 3% saline protocol start at 20 mL/hour  Vitamin K 10 mg IV x 1 now  MRI brain without contrast routine  TTE routine  A1c, lipid panel routine  Diabetes educator to see if appropriate  PT/OT/SLP eval and treat  Case management to follow      Plan of care discussed with Dr. Wall, Dr. Norton, Idris Hopkins PA-C, patient and primary RN.  Stroke neurology will continue to follow.  Thank you for the consult.  Call with any questions or concerns.    Alex Rivers, APRN  December 27, 2023  21:12 EST

## 2023-12-28 NOTE — CASE MANAGEMENT/SOCIAL WORK
Discharge Planning Assessment  [facility]     Patient Name: Chano Rees  MRN: [id number]  Today's Date: 12/28/2023    Admit Date: 12/27/2023    Plan: Home   Discharge Needs Assessment       Row Name 12/28/23 1237       Living Environment    People in Home significant other    Name(s) of People in Home Amrita, [email or web address]    Current Living Arrangements home    Potentially Unsafe Housing Conditions unable to assess    In the past 12 months has the electric, gas, oil, or water company threatened to shut off services in your home? No    Primary Care Provided by self    Provides Primary Care For no one    Family Caregiver if Needed significant other;parent(s)    Family Caregiver Names Amrita, bridgetfrienyonatan    Quality of Family Relationships helpful;involved;supportive       Resource/Environmental Concerns    Resource/Environmental Concerns none    Transportation Concerns none       Food Insecurity    Within the past 12 months, you worried that your food would run out before you got the money to buy more. Never true    Within the past 12 months, the food you bought just didn't last and you didn't have money to get more. Never true       Transition Planning    Patient/Family Anticipates Transition to home with family    Patient/Family Anticipated Services at Transition     Transportation Anticipated family or friend will provide       Discharge Needs Assessment    Equipment Currently Used at Home none    Concerns to be Addressed discharge planning                   Discharge Plan       Row Name 12/28/23 3319       Plan    Plan Home    Patient/Family in Agreement with Plan yes    Plan Comments Spoke with patient and his girlfriend Amrita to initiate discharge planning.  Patient states he and Amrita live in [place], [place]; PCP is Venus Hirsch; insurance is [facility].  Patient states he is independent with ADLs and mobility prior to admission and denies DME or HH use.  Patient plans to return home and  family will transport.  CM will continue to follow and assist with any discharge needs.    Final Discharge Disposition Code 01 - home or self-care                  Continued Care and Services - Admitted Since 12/27/2023    Coordination has not been started for this encounter.          Demographic Summary       Row Name 12/28/23 1236       General Information    Arrived From emergency department    Referral Source admission list    Reason for Consult discharge planning    Preferred Language English       Contact Information    Permission Granted to Share Info With                    Functional Status       Row Name 12/28/23 1237       Functional Status    Usual Activity Tolerance good       Physical Activity    On average, how many days per week do you engage in moderate to strenuous exercise (like a brisk walk)? 0 days    On average, how many minutes do you engage in exercise at this level? 0 min    Number of minutes of exercise per week 0       Functional Status, IADL    Medications independent    Meal Preparation independent    Housekeeping independent    Laundry independent    Shopping independent                   Psychosocial    No documentation.                  Abuse/Neglect    No documentation.                  Legal    No documentation.                  Substance Abuse    No documentation.                  Patient Forms    No documentation.                     Dasha Adhikari RN

## 2023-12-28 NOTE — PLAN OF CARE
Goal Outcome Evaluation:  Plan of Care Reviewed With: patient, family, significant other        Progress: no change  Outcome Evaluation: Pt presents below his functional baseline with deficits including L sided weakness (LUE>LLE), L neglect, impaired cognition, and impaired mobility and ADLs warranting skilled OT services. Pt verbally inappropriate throughout requiring redirection and attention to task. Pt required Mod A x 2 for bed mobility and STS Max A x 2. Posterior and strong L lean. Rec IPR  at dc.      Anticipated Discharge Disposition (OT): inpatient rehabilitation facility

## 2023-12-28 NOTE — H&P
CRITICAL CARE ADMISSION NOTE    Chief Complaint     ICH (intracerebral hemorrhage)    History of Present Illness     Chano Rees is a 53 y.o. male with PMH HTN and noncompliance with antihypertensives who presented to the ED with stroke-like symptoms.  He was LKW at 1905 tonight when he developed sudden onset of left sided weakness, slight headache behind his right eye.  He was brought to the ED by EMS.  In the ED, he had a /139.  CTH showed 3 cm right thalalmic ICH.  CTA H/N were unremarkable.  Labs were unremarkable. He was given 20 mg Labetalol and started on Cardene to keep SBP < 140.  He was also premedicated with 125 mg Solumedrol, 20 mg Pepcid and 50 mg Benadryl due to shellfish allergy.     Time spent: 5 minutes  Electronically signed by AURELIANO Blanchard, 12/27/23, 10:15 PM EST.    The patient was evaluated in the intensive care unit  Awake and alert  Indicates that he is supposed to be on some type of blood pressure medicine but does not take it because it makes him feel lazy  Does not drink alcohol on a regular basis now but indicates that he previously drank a large amount of Venancio Becker on a daily basis    Problem List, Surgical History, Family, Social History, and ROS     Rt Thalamic ICH    Hypertension    Non-compliance    History of alcohol abuse     No past surgical history on file.    Allergies   Allergen Reactions    Shellfish-Derived Products Hives     No current facility-administered medications on file prior to encounter.     No current outpatient medications on file prior to encounter.     MEDICATION LIST AND ALLERGIES REVIEWED.    No family history on file.  Social History     Tobacco Use    Smoking status: Former     Types: Cigarettes    Smokeless tobacco: Current     Types: Chew   Substance Use Topics    Alcohol use: Not Currently     Social History     Social History Narrative    Not     No children    Works as an     Considers his  "girlfriend his medical surrogate     FAMILY AND SOCIAL HISTORY REVIEWED.    Review of Systems  AS ABOVE OR ALL OTHER SYSTEMS REVIEWED AND ARE NEGATIVE.    Physical Exam and Clinical Information   BP (!) 226/119   Pulse 93   Temp 98.4 °F (36.9 °C) (Oral)   Resp 20   Ht 177.8 cm (70\")   Wt 125 kg (275 lb)   SpO2 93%   BMI 39.46 kg/m²   Physical Exam:   GENERAL: Awake, no distress   HEENT: No adenopathy or thyromegaly   LUNGS: Clear without wheezes or rhonchi   HEART: Regular rate and rhythm without murmurs   GI: Soft, nontender   EXTREMITIES: No clubbing, cyanosis, or edema   NEURO/PSYCH: Awake, alert.  Follows commands.  NIH stroke scale as noted    Interval: baseline  1a. Level of Consciousness: 0-->Alert, keenly responsive  1b. LOC Questions: 0-->Answers both questions correctly  1c. LOC Commands: 0-->Performs both tasks correctly  2. Best Gaze: 2-->Forced deviation, or total gaze paresis not overcome by the oculocephalic maneuver  3. Visual: 1-->Partial hemianopia  4. Facial Palsy: 3-->Complete paralysis of one or both sides (absence of facial movement in the upper and lower face)  5a. Motor Arm, Left: 2-->Some effort against gravity, limb cannot get to or maintain (if cued) 90 (or 45) degrees, drifts down to bed, but has some effort against gravity  5b. Motor Arm, Right: 0-->No drift, limb holds 90 (or 45) degrees for full 10 secs  6a. Motor Leg, Left: 1-->Drift, leg falls by the end of the 5-sec period but does not hit bed  6b. Motor Leg, Right: 0-->No drift, leg holds 30 degree position for full 5 secs  7. Limb Ataxia: 2-->Present in two limbs  8. Sensory: 1-->Mild-to-moderate sensory loss, patient feels pinprick is less sharp or is dull on the affected side, or there is a loss of superficial pain with pinprick, but patient is aware of being touched  9. Best Language: 0-->No aphasia, normal  10. Dysarthria: 1-->Mild-to-moderate dysarthria, patient slurs at least some words and, at worst, can be " "understood with some difficulty  11. Extinction and Inattention (formerly Neglect): 2-->Profound lamberto-inattention/extinction more than 1 modality    Total (NIH Stroke Scale): 15      Results from last 7 days   Lab Units 12/27/23 2117 12/27/23 2107   WBC 10*3/mm3 9.71  --    HEMOGLOBIN g/dL 16.3  --    HEMOGLOBIN, POC g/dL  --  17.0   PLATELETS 10*3/mm3 249  --      Results from last 7 days   Lab Units 12/27/23 2107   CREATININE mg/dL 1.20     Estimated Creatinine Clearance: 94.5 mL/min (by C-G formula based on SCr of 1.2 mg/dL).          No results found for: \"LACTATE\"     IMAGES: Chest x-ray without consolidation or effusions.  CT scan of the head reveals a right thalamic bleed    I reviewed the patient's results and images.     Assesment     Active Hospital Problems    Diagnosis     **Rt Thalamic ICH     Hypertension     Non-compliance     History of alcohol abuse      Plan/Recommendations     Admit to the intensive care unit  Blood pressure control  Start oral antihypertensive regimen  Neurosurgical consultation  Serial neurologic exam  ICH order set  Thiamine, folate, multivitamin  Monitor for evidence of alcohol withdrawal  Nicotine replacement therapy    Critical Care time spent in direct patient care: 35 minutes (excluding procedure time, if applicable) including high complexity decision making to assess, manipulate, and support vital organ system failure in this individual who has impairment of one or more vital organ systems such that there is a high probability of imminent or life threatening deterioration in the patient’s condition.    KATHY Ziegler MD  Pulmonary and Critical Care Medicine     CC: Provider, No Known  "

## 2023-12-28 NOTE — PROGRESS NOTES
Neurology Note    Patient:  Chano Rees    YOB: 1970    REFERRING PHYSICIAN:  Dr. Ziegler    CHIEF COMPLAINT:    Left-sided weakness and facial droop    HISTORY OF PRESENT ILLNESS:   The patient is stable, NIHSS 16 this am partly 22 incorrect coy verbal responses per RN, started on CIWA, no seizures reported.    Past Medical History:  Past Medical History:   Diagnosis Date    Hypertension        Past Surgical History:  Past Surgical History:   Procedure Laterality Date    ANKLE SURGERY Left        Social History:   Social History     Socioeconomic History    Marital status: Unknown   Tobacco Use    Smoking status: Former     Types: Cigarettes    Smokeless tobacco: Current     Types: Chew   Vaping Use    Vaping Use: Never used   Substance and Sexual Activity    Alcohol use: Not Currently    Drug use: Not Currently    Sexual activity: Defer        Family History:   History reviewed. No pertinent family history.    Medications Prior to Admission:    Prior to Admission medications    Not on File       Allergies:  Shellfish-derived products      Review of system  Review of Systems   Unable to perform ROS: Mental status change       Vitals:    12/28/23 1000   BP: 106/57   Pulse: 105   Resp:    Temp:    SpO2: 95%       Physical exam  Physical Exam  Eyes:      Extraocular Movements: Extraocular movements intact.      Pupils: Pupils are equal, round, and reactive to light.   Cardiovascular:      Rate and Rhythm: Normal rate and regular rhythm.   Pulmonary:      Effort: Pulmonary effort is normal.   Neurological:      Mental Status: He is alert and oriented to person, place, and time.      Sensory: Sensation is intact.      Deep Tendon Reflexes: Babinski sign absent on the right side. Babinski sign present on the left side.      Comments: Speech dysarthric, left facial droop, VFF, moves right side well, left side some against gravity.           Lab Results   Component Value Date    WBC 11.19 (H)  12/27/2023    HGB 17.1 12/27/2023    HCT 50.0 12/27/2023    MCV 87.0 12/27/2023     12/27/2023     Lab Results   Component Value Date    GLUCOSE 165 (H) 12/28/2023    BUN 17 12/28/2023    CREATININE 1.44 (H) 12/28/2023    BCR 11.8 12/28/2023    CO2 17.0 (L) 12/28/2023    CALCIUM 9.7 12/28/2023    ALBUMIN 4.3 12/27/2023    AST 28 12/27/2023    ALT 41 12/27/2023   Urine Drug Screen - Urine, Clean Catch  Order: [id number]  Status: Final result       Visible to patient: No (not released)       Next appt: None    Specimen Information: Urine, Clean Catch   0 Result Notes      Component  Ref Range & Units 1 d ago   THC, Screen, Urine  Negative Negative   Phencyclidine (PCP), Urine  Negative Negative   Cocaine Screen, Urine  Negative Negative   Methamphetamine, Ur  Negative Negative   Opiate Screen  Negative Negative   Amphetamine Screen, Urine  Negative Negative   Benzodiazepine Screen, Urine  Negative Negative   Tricyclic Antidepressants Screen  Negative Negative   Methadone Screen, Urine  Negative Negative   Barbiturates Screen, Urine  Negative Negative   Oxycodone Screen, Urine  Negative Negative   Buprenorphine, Screen, Urine  Negative Negative        Osmolality  275 - 295 mOsm/kg 312 High  307 High      Sodium  136 - 145 mmol/L 139     Radiological Studies:   Adult Transthoracic Echo Complete W/ Cont if Necessary Per Protocol (With Agitated Saline)    Result Date: 12/28/2023    Left ventricular systolic function is normal. Calculated left ventricular EF = 55.6%   Left ventricular diastolic function is consistent with (grade I) impaired relaxation.   Saline test results are negative.     CT Head Without Contrast    Result Date: 12/28/2023  CT HEAD WO CONTRAST Date of Exam: 12/28/2023 5:05 AM EST Indication: Stroke, follow up. Comparison: CT scan of the head dated December 27, 2023 at 2105 hours Technique: Axial CT images were obtained of the head without contrast administration.  Automated exposure control and  iterative construction methods were used. Findings: There has been very slight increase in the size of the right thalamic hemorrhage which was 3 cm and is now 3.2 cm. There is new right posterior horn lateral ventricle intraventricular hemorrhage which is very mild measuring less than 3 mm in thickness and about 7 mm in transverse diameter layering in the posterior horn. There is mild right to left midline shift of about 2 mm. Contrast is seen in the middle cerebral arteries. There are no abnormal extra-axial fluid collections. There are no new hemorrhages.     Impression: Very slight increase in the size of the right thalamic hemorrhage 3.2 cm in diameter previously 3 cm with 2 mm of right to left midline shift and a trace amount of posterior horn right lateral ventricle intraventricular hemorrhage. Electronically Signed: Chrisotph Richardson MD  12/28/2023 5:33 AM EST  Workstation ID: [id number]    XR Chest 1 View    Result Date: 12/27/2023  XR CHEST 1 VW Date of Exam: 12/27/2023 9:49 PM EST Indication: Acute Stroke Protocol (onset < 12 hrs) Comparison: None available. Findings: Enlarged cardiac silhouette. Ectatic thoracic aorta. No focal consolidation. No pleural effusion. No pneumothorax. No acute osseous abnormality.     Impression: No focal consolidation. Enlarged cardiac silhouette and ectatic thoracic aorta. Electronically Signed: Victor M Guevara MD  12/27/2023 10:17 PM EST  Workstation ID: [id number]    CT Angiogram Head w AI Analysis of LVO    Result Date: 12/27/2023  CT ANGIOGRAM HEAD W AI ANALYSIS OF LVO, CT ANGIOGRAM NECK Date of Exam: 12/27/2023 9:10 PM EST Indication: Neuro deficit, acute stroke suspected Neuro deficit, acute stroke suspected. Comparison: Same day noncontrast head CT. Technique: CTA of the head was performed after the uneventful intravenous administration of 75 mL Isovue-370. Reconstructed coronal and sagittal images were also obtained. In addition, a 3-D volume rendered image was created for  interpretation. Automated  exposure control and iterative reconstruction methods were used. Findings: Vascular findings: Bilateral common carotid arteries are patent. Low-grade mixed atheromatous disease of the carotid bifurcations without significant stenosis by NASCET criteria. Bilateral cervical and intracranial portions of the internal carotid arteries are patent. Bilateral MCAs are patent. Bilateral ACAs are patent. Vertebral arteries are patent. Basilar artery is patent. Bilateral PCAs are patent. No evidence of dissection or aneurysm. Nonvascular findings: Visualized lung apices are grossly clear. Glandular tissue is symmetric and normal appearing. Aerodigestive structures are within normal limits. No acute or suspicious soft tissue or osseous lesion. Right thalamic hemorrhage as discussed on noncontrast head CT.     Impression: No evidence of large vessel occlusion, flow-limiting stenosis, dissection, or aneurysm. Electronically Signed: Victor M Guevara MD  12/27/2023 9:46 PM EST  Workstation ID: [id number]    CT Angiogram Neck    Result Date: 12/27/2023  CT ANGIOGRAM HEAD W AI ANALYSIS OF LVO, CT ANGIOGRAM NECK Date of Exam: 12/27/2023 9:10 PM EST Indication: Neuro deficit, acute stroke suspected Neuro deficit, acute stroke suspected. Comparison: Same day noncontrast head CT. Technique: CTA of the head was performed after the uneventful intravenous administration of 75 mL Isovue-370. Reconstructed coronal and sagittal images were also obtained. In addition, a 3-D volume rendered image was created for interpretation. Automated  exposure control and iterative reconstruction methods were used. Findings: Vascular findings: Bilateral common carotid arteries are patent. Low-grade mixed atheromatous disease of the carotid bifurcations without significant stenosis by NASCET criteria. Bilateral cervical and intracranial portions of the internal carotid arteries are patent. Bilateral MCAs are patent. Bilateral ACAs are  patent. Vertebral arteries are patent. Basilar artery is patent. Bilateral PCAs are patent. No evidence of dissection or aneurysm. Nonvascular findings: Visualized lung apices are grossly clear. Glandular tissue is symmetric and normal appearing. Aerodigestive structures are within normal limits. No acute or suspicious soft tissue or osseous lesion. Right thalamic hemorrhage as discussed on noncontrast head CT.     Impression: No evidence of large vessel occlusion, flow-limiting stenosis, dissection, or aneurysm. Electronically Signed: Victor M Guevara MD  12/27/2023 9:46 PM EST  Workstation ID: [id number]    CT Head Without Contrast Stroke Protocol    Result Date: 12/27/2023  CT HEAD WO CONTRAST STROKE PROTOCOL Date of Exam: 12/27/2023 9:03 PM EST Indication: Neuro deficit, acute, stroke suspected Neuro deficit, acute stroke suspected. Comparison: None available. Technique: Axial CT images were obtained of the head without contrast administration.  Reconstructed coronal images were also obtained. Automated exposure control and iterative construction methods were used. Scan Time: 2105 Results discussed with the stroke coordinator via telephone by Victor M Guevara MD at 2106. Findings: 3.0 x 3.0 x 2.3 cm parenchymal hemorrhage centered in the right thalamus. Mild surrounding edema with mild regional mass effect without overt midline. No evidence of intraventricular blood products. Lacunar infarct in the left basal ganglia. No extra-axial collection. Ventricular system is nondilated. Basal cisterns are patent. No acute or suspicious soft tissue or osseous lesion. Paranasal sinuses and mastoid air cells are well aerated.     Impression: Right thalamic hemorrhage measuring up to 3 cm. Electronically Signed: Victor M Guevara MD  12/27/2023 9:36 PM EST  Workstation ID: [id number]       During this visit the following were done:  Labs Reviewed [x]    Labs Ordered []    Radiology Reports Reviewed [x]    Radiology Ordered [x]    EKG,  echo, and/or stress test reviewed [x]    EEG results reviewed  []    EEG reviewed and interpreted per myself   []    Discussed case with neurointerventionalist or neuroradiologist []    Referring Provider Records Reviewed []    ER Records Reviewed []    Hospital Records Reviewed []    History Obtained From Family []    Radiological images view and Interpreted per myself [x]    Case Discussed with referring provider []     Decision to obtain and request outside records  []        Assessment and Plan     Acute right BG ICH with mild mass effect 22 HTN, noncompliant with meds. F/U CTH stable. Question of alcohol abuse.   - ICU observation overnight.   - BP<140/90.   - Stop 3% NS.   - F/U CTH in am.   - CIWA.   - Thiamine.   - ST, PT, OT.              Electronically signed by Edvin Coffey MD on 12/28/2023 at 12:02 EST

## 2023-12-28 NOTE — PLAN OF CARE
Problem: Adult Inpatient Plan of Care  Goal: Plan of Care Review  Outcome: Ongoing, Progressing  Flowsheets (Taken 12/28/2023 1058)  Progress: no change  Plan of Care Reviewed With:   patient   family  Goal: Patient-Specific Goal (Individualized)  Outcome: Ongoing, Progressing  Goal: Absence of Hospital-Acquired Illness or Injury  Outcome: Ongoing, Progressing  Intervention: Identify and Manage Fall Risk  Recent Flowsheet Documentation  Taken 12/28/2023 1000 by Olivia Barboza RN  Safety Promotion/Fall Prevention:   activity supervised   assistive device/personal items within reach   clutter free environment maintained   fall prevention program maintained   lighting adjusted   nonskid shoes/slippers when out of bed   safety round/check completed   room organization consistent  Taken 12/28/2023 0800 by Olivia Barboza RN  Safety Promotion/Fall Prevention:   activity supervised   assistive device/personal items within reach   clutter free environment maintained   fall prevention program maintained   lighting adjusted   nonskid shoes/slippers when out of bed   room organization consistent   safety round/check completed  Intervention: Prevent Skin Injury  Recent Flowsheet Documentation  Taken 12/28/2023 1000 by Olivia Barboza RN  Body Position:   turned   legs elevated   weight shifting  Skin Protection:   adhesive use limited   incontinence pads utilized   transparent dressing maintained   tubing/devices free from skin contact  Taken 12/28/2023 0800 by Olivia Barboza RN  Body Position:   turned   legs elevated   weight shifting  Skin Protection:   adhesive use limited   incontinence pads utilized   tubing/devices free from skin contact  Intervention: Prevent and Manage VTE (Venous Thromboembolism) Risk  Recent Flowsheet Documentation  Taken 12/28/2023 1000 by Olivia Barboza RN  Activity Management: bedrest  VTE Prevention/Management:   bilateral   sequential compression devices on  Taken 12/28/2023 0800  by Olivia Barboza RN  Activity Management: bedrest  VTE Prevention/Management:   bilateral   sequential compression devices on  Range of Motion:   active ROM (range of motion) encouraged   ROM (range of motion) performed  Intervention: Prevent Infection  Recent Flowsheet Documentation  Taken 12/28/2023 0800 by Olivia Barboza RN  Infection Prevention:   environmental surveillance performed   hand hygiene promoted   rest/sleep promoted   single patient room provided  Goal: Optimal Comfort and Wellbeing  Outcome: Ongoing, Progressing  Intervention: Monitor Pain and Promote Comfort  Recent Flowsheet Documentation  Taken 12/28/2023 0800 by Olivia Barboza RN  Pain Management Interventions:   care clustered   pillow support provided   position adjusted  Intervention: Provide Person-Centered Care  Recent Flowsheet Documentation  Taken 12/28/2023 0800 by Olivia Barboza RN  Trust Relationship/Rapport:   care explained   choices provided  Goal: Readiness for Transition of Care  Outcome: Ongoing, Progressing     Problem: Hypertension Comorbidity  Goal: Blood Pressure in Desired Range  Outcome: Ongoing, Progressing  Intervention: Maintain Blood Pressure Management  Recent Flowsheet Documentation  Taken 12/28/2023 1000 by Olivia Barboza RN  Medication Review/Management: medications reviewed  Taken 12/28/2023 0800 by Olivia Barboza RN  Medication Review/Management: medications reviewed     Problem: Skin Injury Risk Increased  Goal: Skin Health and Integrity  Outcome: Ongoing, Progressing  Intervention: Optimize Skin Protection  Recent Flowsheet Documentation  Taken 12/28/2023 1000 by Olivia Barboza RN  Pressure Reduction Techniques:   frequent weight shift encouraged   heels elevated off bed   positioned off wounds   pressure points protected   weight shift assistance provided  Head of Bed (HOB) Positioning: HOB at 30 degrees  Pressure Reduction Devices:   positioning supports utilized    pressure-redistributing mattress utilized  Skin Protection:   adhesive use limited   incontinence pads utilized   transparent dressing maintained   tubing/devices free from skin contact  Taken 12/28/2023 0800 by Olivia Barboza RN  Pressure Reduction Techniques:   frequent weight shift encouraged   heels elevated off bed   positioned off wounds   pressure points protected   weight shift assistance provided  Head of Bed (HOB) Positioning: HOB at 30 degrees  Pressure Reduction Devices:   positioning supports utilized   pressure-redistributing mattress utilized  Skin Protection:   adhesive use limited   incontinence pads utilized   tubing/devices free from skin contact     Problem: Fall Injury Risk  Goal: Absence of Fall and Fall-Related Injury  Outcome: Ongoing, Progressing  Intervention: Identify and Manage Contributors  Recent Flowsheet Documentation  Taken 12/28/2023 1000 by Olivia Barboza RN  Medication Review/Management: medications reviewed  Taken 12/28/2023 0800 by Olivia Barboza, RN  Medication Review/Management: medications reviewed  Intervention: Promote Injury-Free Environment  Recent Flowsheet Documentation  Taken 12/28/2023 1000 by Olivia Barboza RN  Safety Promotion/Fall Prevention:   activity supervised   assistive device/personal items within reach   clutter free environment maintained   fall prevention program maintained   lighting adjusted   nonskid shoes/slippers when out of bed   safety round/check completed   room organization consistent  Taken 12/28/2023 0800 by Olivia Barboza, RN  Safety Promotion/Fall Prevention:   activity supervised   assistive device/personal items within reach   clutter free environment maintained   fall prevention program maintained   lighting adjusted   nonskid shoes/slippers when out of bed   room organization consistent   safety round/check completed     Problem: Adjustment to Illness (Stroke, Hemorrhagic)  Goal: Optimal Coping  Outcome: Ongoing,  Progressing  Intervention: Support Psychosocial Response to Stroke  Recent Flowsheet Documentation  Taken 12/28/2023 0800 by Olivia Barboza, RN  Family/Support System Care:   self-care encouraged   support provided     Problem: Bowel Elimination Impaired (Stroke, Hemorrhagic)  Goal: Effective Bowel Elimination  Outcome: Ongoing, Progressing     Problem: Cerebral Tissue Perfusion (Stroke, Hemorrhagic)  Goal: Optimal Cerebral Tissue Perfusion  Outcome: Ongoing, Progressing     Problem: Cognitive Impairment (Stroke, Hemorrhagic)  Goal: Optimal Cognitive Function  Outcome: Ongoing, Progressing     Problem: Communication Impairment (Stroke, Hemorrhagic)  Goal: Effective Communication Skills  Outcome: Ongoing, Progressing  Intervention: Optimize Communication Skills  Recent Flowsheet Documentation  Taken 12/28/2023 0800 by Olivia Barboza, RN  Communication Enhancement Strategies:   call light answered in person   one-step directions provided   repetition utilized     Problem: Functional Ability Impaired (Stroke, Hemorrhagic)  Goal: Optimal Functional Ability  Outcome: Ongoing, Progressing  Intervention: Optimize Functional Ability  Recent Flowsheet Documentation  Taken 12/28/2023 1000 by Olivia Barboza RN  Activity Management: bedrest  Taken 12/28/2023 0800 by Olivia Barboza RN  Activity Management: bedrest     Problem: Pain (Stroke, Hemorrhagic)  Goal: Acceptable Pain Control  Outcome: Ongoing, Progressing  Intervention: Monitor and Manage Pain  Recent Flowsheet Documentation  Taken 12/28/2023 0800 by Olivia Barboza RN  Pain Management Interventions:   care clustered   pillow support provided   position adjusted     Problem: Respiratory Compromise (Stroke, Hemorrhagic)  Goal: Effective Oxygenation and Ventilation  Outcome: Ongoing, Progressing  Intervention: Optimize Oxygenation and Ventilation  Recent Flowsheet Documentation  Taken 12/28/2023 1000 by Olivia Barboza RN  Head of Bed (HOB) Positioning: HOB  at 30 degrees  Taken 12/28/2023 0800 by Olivia Barboza RN  Head of Bed (HOB) Positioning: HOB at 30 degrees     Problem: Sensorimotor Impairment (Stroke, Hemorrhagic)  Goal: Improved Sensorimotor Function  Outcome: Ongoing, Progressing  Intervention: Optimize Range of Motion, Motor Control and Function  Recent Flowsheet Documentation  Taken 12/28/2023 1000 by Olivia Barboza RN  Positioning/Transfer Devices:   pillows   in use  Taken 12/28/2023 0800 by Olivia Barboza RN  Positioning/Transfer Devices:   pillows   in use  Range of Motion:   active ROM (range of motion) encouraged   ROM (range of motion) performed  Intervention: Optimize Sensory and Perceptual Ability  Recent Flowsheet Documentation  Taken 12/28/2023 1000 by Olivia Barboza RN  Pressure Reduction Techniques:   frequent weight shift encouraged   heels elevated off bed   positioned off wounds   pressure points protected   weight shift assistance provided  Pressure Reduction Devices:   positioning supports utilized   pressure-redistributing mattress utilized  Taken 12/28/2023 0800 by Olivia Barboza RN  Pressure Reduction Techniques:   frequent weight shift encouraged   heels elevated off bed   positioned off wounds   pressure points protected   weight shift assistance provided  Pressure Reduction Devices:   positioning supports utilized   pressure-redistributing mattress utilized     Problem: Swallowing Impairment (Stroke, Hemorrhagic)  Goal: Optimal Eating and Swallowing Without Aspiration  Outcome: Ongoing, Progressing     Problem: Urinary Elimination Impaired (Stroke, Hemorrhagic)  Goal: Effective Urinary Elimination  Outcome: Ongoing, Progressing   Goal Outcome Evaluation:  Plan of Care Reviewed With: patient, family        Progress: no change

## 2023-12-28 NOTE — CONSULTS
Consults    Referring Provider: Toney MARTINEZ    Patient Care Team:  Provider, No Known as PCP - General    Chief Complaint: Left-sided weakness    Subjective .     History of present illness:       Patient is a nice 53-year-old gentleman who was brought in secondary to left-sided weakness.  Patient is noncompliant and has history of high blood pressure.  Upon arrival to the hospital his blood pressure was 203/140 and was taken to the CT scanner for stroke protocol secondary to left-sided weakness and left-sided facial droop and slurred speech.    Patient was found to have a deep-seated right basal ganglia hemorrhage.  Secondary to this neurosurgery was asked to look at the scans.    Patient was taken to medically speaking about his blood pressure under control and was evaluated postop day #1.  Patient's extinction of his left upper extremity has improved at this point and he is able to follow some commands on his left side.  Patient still has a forced gaze toward the right.    CT of the head was reviewed and compared with 1 upon arrival and shows no real serious extension perhaps some mild expansion.     This is a deep-seated and nonsurgical hemorrhage      Review of Systems  Reviewed 14 points negative other than HPI  History  Past Medical History:   Diagnosis Date    Hypertension    ,   Past Surgical History:   Procedure Laterality Date    ANKLE SURGERY Left    , History reviewed. No pertinent family history.,   Social History     Socioeconomic History    Marital status: Unknown   Tobacco Use    Smoking status: Former     Types: Cigarettes    Smokeless tobacco: Current     Types: Chew   Vaping Use    Vaping Use: Never used   Substance and Sexual Activity    Alcohol use: Not Currently    Drug use: Not Currently    Sexual activity: Defer     E-cigarette/Vaping    E-cigarette/Vaping Use Never User     Passive Exposure No     Counseling Given No      E-cigarette/Vaping Substances     E-cigarette/Vaping Devices          ,   No medications prior to admission.   , Scheduled Meds:  folic acid 1 mg in sodium chloride 0.9 % 50 mL IVPB, 1 mg, Intravenous, Daily  lisinopril, 10 mg, Oral, Q24H  LORazepam, 2 mg, Oral, Q6H   Followed by  [START ON 12/29/2023] LORazepam, 1 mg, Oral, Q6H  metoprolol tartrate, 25 mg, Oral, Q12H  multivitamin pediatric chewable, 1 tablet, Oral, Daily  nicotine, 1 patch, Transdermal, Q24H  sodium chloride, 10 mL, Intravenous, Q12H  thiamine (B-1) IV, 100 mg, Intravenous, Daily    , Continuous Infusions:  niCARdipine, 5-15 mg/hr, Last Rate: 10 mg/hr (12/28/23 1334)  sodium chloride, 20 mL/hr, Last Rate: 20 mL/hr (12/28/23 0001)    , PRN Meds:    Calcium Replacement - Follow Nurse / BPA Driven Protocol    labetalol    LORazepam **OR** midazolam **OR** LORazepam    Magnesium Standard Dose Replacement - Follow Nurse / BPA Driven Protocol    Phosphorus Replacement - Follow Nurse / BPA Driven Protocol    Potassium Replacement - Follow Nurse / BPA Driven Protocol    sodium chloride    sodium chloride    sodium chloride, and Allergies:  Shellfish-derived products   SMOKING STATUS: Non-smoker  Objective     Vital Signs   Temp:  [97.9 °F (36.6 °C)-98.4 °F (36.9 °C)] 98 °F (36.7 °C)  Heart Rate:  [] 101  Resp:  [18-22] 18  BP: (106-226)/() 126/85  Body mass index is 38.83 kg/m².    Physical Exam:     Body mass index is 38.83 kg/m².    GENERAL:           The patient is in no acute distress, and is able to answer all questions appropriately.  But with slurred speech    Neck:          Supple without lymphadenopathy    Cardiovascular:       Peripheral pulses 2+ at dorsalis pedis and posterior tibialis    Lungs:         Breathing unlabored    Musculoskeletal:            strength is 5 out of 5 on the right 4 out of 5 on left       Shoulder abduction is 5 out of 5.  On right 3 out of 5 on left       Dorsiflexion is 5/5 on right 4-5 on left       Plantarflexion is 5/5 on right 4-5 on left       Hip Flexion  5/5 on right 3 out of 5 on left  Gait not tested    Neurologic:          The patient is alert and oriented           Pupils are equal and reactive to light.         Visual fields show neglect of the left side       Patient has facial droop and mild left pronator drift        sensation is disrupted on the left side    CRANIAL NERVES:         Cranial nerve II: Visual fields are full to confrontation.       Cranial nerves III, IV and VI: PERRLA DC.  Extraocular movements are intact.  Nystagmus is not present.       Cranial nerve V: Facial sensation is intact to light touch.       Cranial nerve VII: Facial droop noted on the left       Cranial nerve VIII: Hearing is intact to finger rub bilaterally.       Cranial nerve IX and X: Palate elevates symmetrically.        Cranial nerve XI: Shoulder shrug is intact.       Cranial nerve XII: Tongue is midline without evidence of Atrophy or fasciculation.      Results Review:   I reviewed the patient's new imaging results and agree with the interpretation.  Discussed with Dr. Murphy.    Patient has had 2 stable CT showing no real extension of the right thalamic hemorrhage    Assessment & Plan       Rt Thalamic ICH    Hypertension    Non-compliance    History of alcohol abuse    From a neurosurgical perspective there is no role for neurosurgical intervention.  We be happy to follow-up with him outpatient after he is medically cleared and gets out of rehab with a repeat CT to ensure that there is resolution of the clot    I discussed the patients findings and my recommendations with patient and family    Idris Hopkins PA-C  12/28/23  14:34 EST    Time:  60 minutes

## 2023-12-28 NOTE — THERAPY EVALUATION
Acute Care - Speech Language Pathology Initial Evaluation  [facility]  Cognitive-Communication Evaluation       Patient Name: Chano Rees  : 1970  MRN: [id number]  Today's Date: 2023               Admit Date: 2023     Visit Dx:    ICD-10-CM ICD-9-CM   1. Intracranial hemorrhage  I62.9 432.9   2. Uncontrolled hypertension  I10 401.9   3. Personal history of noncompliance with medical treatment  Z91.199 V15.81   4. Cognitive communication deficit  R41.841 799.52     Patient Active Problem List   Diagnosis    Rt Thalamic ICH    Hypertension    Non-compliance    History of alcohol abuse     Past Medical History:   Diagnosis Date    Hypertension      Past Surgical History:   Procedure Laterality Date    ANKLE SURGERY Left        SLP Recommendation and Plan  SLP Diagnosis: moderate-severe, cognitive-linguistic disorder, mild, dysarthria (23)  SLP Diagnosis Comments: Completed cognitive communication and speech assessment.  Pt with mild dysarthria with decreased labial strength impacting plosive production in isolation and during speech.Moderate to severe cognitive communication impairment impacting memory, attention, reasoning, problem solving, visuoperception, and awareness.  Significant left neglect with pt denying impairment or change in function.  Assessment is challenging due to pt's resistance to cooperate, however he clearly exhibits impaired cognition with decreased awareness and with impulsivity both of which impact safety.  SLP to follow. (23)        Swallow Criteria for Skilled Therapeutic Interventions Met: demonstrates skilled criteria (23)  SLC Criteria for Skilled Therapy Interventions Met: yes (23)  Anticipated Discharge Disposition (SLP): inpatient rehabilitation facility (23)     Therapy Frequency (Swallow): 5 days per week (23)  Therapy Frequency (SLP SLC): 5 days per week (23)  Predicted Duration  Therapy Intervention (Days): until discharge (12/28/23 0915)  Oral Care Recommendations: Oral Care BID/PRN, Suction toothbrush, Toothbrush (As pt allows) (12/28/23 0915)                          Outcome Evaluation: Completed swallowing evaluation and recommended a PO diet. Completed a speech/language/cognitive evaluation. Needs follow up by ST for swallowing and cognitive communication/speech.  SLP to follow during acute care stay. (12/28/23 1302)      SLP EVALUATION (last 72 hours)       SLP SLC Evaluation       Row Name 12/28/23 0915                   Communication Assessment/Intervention    Document Type evaluation  -ML        Subjective Information complains of  thirsty  -ML        Patient Observations alert  -ML        Patient/Family/Caregiver Comments/Observations Family present at the completion of the evaluation  -ML        Patient Effort adequate  -ML        Comment Pt is resistant to questions and direction.  -ML        Symptoms Noted During/After Treatment other (see comments)  left side neglect  -ML        Oral Care patient refused intervention  -ML           General Information    Patient Profile Reviewed yes  -ML        Pertinent History Of Current Problem Pt admitted with ICH- right thalamic. Hx of HTN, noncompliance with medication, hx of ETOH, NIH 15  -ML        Precautions/Limitations, Vision neglect, left  -ML        Precautions/Limitations, Hearing WFL;for purposes of eval  -ML        Patient Level of Education 12th grade  -ML        Prior Level of Function-Communication WFL  -ML        Plans/Goals Discussed with patient  -ML        Barriers to Rehab resistant to information  -ML        Patient's Goals for Discharge patient did not state  -ML        Family Goals for Discharge family did not state  -ML           Pain Scale: Numbers Pre/Post-Treatment    Pretreatment Pain Rating 0/10 - no pain  -ML        Posttreatment Pain Rating 0/10 - no pain  -ML           Comprehension Assessment/Intervention     Comprehension Assessment/Intervention Auditory Comprehension  -ML           Auditory Comprehension Assessment/Intervention    Auditory Comprehension (Communication) [facility]  -ML        Able to Identify Objects/Pictures (Communication) body part;familiar objects;L  -ML        Answers Questions (Communication) yes/no;wh questions;personal;simple;L  -ML        Able to Follow Commands (Communication) 1-step;2-step;WFL  -ML        Narrative Discourse conversational level;WFL  -ML           Expression Assessment/Intervention    Expression Assessment/Intervention verbal expression  -ML           Verbal Expression Assessment/Intervention    Verbal Expression [facility]  -        Automatic Speech (Communication) counting 1-20;months of year;WFL  -ML        Repetition words;phrases;[facility]  -ML        Responsive Naming [facility]  -        Confrontational Naming [facility]  -        Spontaneous/Functional Words [facility]  -ML        Sentence Formulation [facility]  -           Oral Motor Structure and Function    Oral Motor Structure and Function moderate impairment  -ML        Dentition Assessment poor oral hygiene;missing teeth;teeth are in poor condition  -        Mucosal Quality sticky  -ML           Oral Musculature and Cranial Nerve Assessment    Oral Motor General Assessment lingual impairment;oral labial or buccal impairment  -ML        Oral Labial or Buccal Impairment, Detail, Cranial Nerve VII (Facial): left labial droop;reduced ROM  -ML        Lingual Impairment, Detail. Cranial Nerves IX, XII (Glossopharyngeal and Hypoglossal) reduced strength  -           Motor Speech Assessment/Intervention    Motor Speech Function mild impairment  -ML        Characteristics Consistent with Dysarthria increased rate;decreased articulation  -        Speech intelligibility 90%;in connected speech;with unfamiliar listener  -ML           Cognitive Assessment Intervention- SLP    Cognitive Function (Cognition) moderate impairment  -ML        Orientation  Status (Cognition) awareness of basic personal information;person;place;time;situation;WFL  -ML        Memory (Cognitive) simple;immediate;delayed;moderate impairment  -ML        Attention (Cognitive) sustained;moderate impairment  -ML        Thought Organization (Cognitive) abstract divergent;mild impairment;moderate impairment  -ML        Reasoning (Cognitive) mod-complex;mild impairment;moderate impairment  -ML        Problem Solving (Cognitive) divergent;multifactorial;moderate impairment  -ML        Functional Math (Cognitive) simple;WFL  -ML        Executive Function (Cognition) deficit awareness;judgement;self-monitoring/correction;moderate impairment;severe impairment  -ML        Pragmatics (Communication) affect;eye contact;turn taking;moderate impairment;severe impairment  -ML        Right Hemisphere Function left neglect;deficit awareness;safety awareness;visuo-spatial;moderate impairment;severe impairment  -ML           SLP Evaluation Clinical Impressions    SLP Diagnosis moderate-severe;cognitive-linguistic disorder;mild;dysarthria  -ML        SLP Diagnosis Comments Completed cognitive communication and speech assessment.  Pt with mild dysarthria with decreased labial strength impacting plosive production in isolation and during speech.Moderate to severe cognitive communication impairment impacting memory, attention, reasoning, problem solving, visuoperception, and awareness.  Significant left neglect with pt denying impairment or change in function.  Assessment is challenging due to pt's resistance to cooperate, however he clearly exhibits impaired cognition with decreased awareness and with impulsivity both of which impact safety.  SLP to follow.  -ML        Rehab Potential/Prognosis good  -ML        SLC Criteria for Skilled Therapy Interventions Met yes  -ML        Functional Impact Poor Judgement  -ML           Recommendations    Therapy Frequency (SLP SLC) 5 days per week  -ML        Predicted  Duration Therapy Intervention (Days) until discharge  -ML        Anticipated Discharge Disposition (SLP) inpatient rehabilitation facility  -ML                  User Key  (r) = Recorded By, (t) = Taken By, (c) = Cosigned By      Initials Name Effective Dates    ML Zandra Vasquez, CCC-SLP 06/16/21 -                        EDUCATION  The patient has been educated in the following areas:     Cognitive Impairment Communication Impairment.           SLP GOALS       Row Name 12/28/23 0915             (LTG) Patient will demonstrate functional swallow for    Diet Texture (Demonstrate functional swallow) regular textures  -ML      Liquid viscosity (Demonstrate functional swallow) thin liquids  -ML      [place] (Demonstrate functional swallow) with minimal cues (75-90% accuracy)  -ML      Time Frame (Demonstrate functional swallow) 1 week  -ML      Barriers (Demonstrate functional swallow) Cognition/resistance  -ML      Progress/Outcomes (Demonstrate functional swallow) new goal  -ML         (STG) Patient will tolerate trials of    Consistencies Trialed (Tolerate trials) regular textures;thin liquids  -ML      Desired Outcome (Tolerate trials) without signs/symptoms of aspiration;with adequate oral prep/transit/clearance  -ML      [place] (Tolerate trials) with minimal cues (75-90% accuracy)  -ML      Time Frame (Tolerate trials) 1 week  -ML      Progress/Outcomes (Tolerate trials) new goal  -ML         (STG) Labial Strengthening Goal 1 (SLP)    Activity (Labial Strengthening Goal 1, SLP) increase labial tone  -ML      Increase Labial Tone labial resistance exercises  -ML      [place]/Accuracy (Labial Strengthening Goal 1, SLP) with minimal cues (75-90% accuracy)  -ML      Time Frame (Labial Strengthening Goal 1, SLP) short term goal (STG);by discharge  -ML      Progress/Outcomes (Labial Strengthening Goal 1, SLP) new goal  -ML         (STG) Lingual Strengthening Goal 1 (SLP)    Activity (Lingual  Strengthening Goal 1, SLP) increase lingual tone/sensation/control/coordination/movement  -ML      Increase Lingual Tone/Sensation/Control/Coordination/Movement lingual movement exercises  -ML      [place]/Accuracy (Lingual Strengthening Goal 1, SLP) with minimal cues (75-90% accuracy)  -ML      Time Frame (Lingual Strengthening Goal 1, SLP) short term goal (STG);by discharge  -ML      Progress/Outcomes (Lingual Strengthening Goal 1, SLP) new goal  -ML         Patient will demonstrate functional speech skills for return to discharge environment    [place] Independently  -ML      Time frame by discharge  -ML      Progress/Outcomes new goal  -ML         Patient will demonstrate functional cognitive-linguistic skills for return to discharge environment    [place] with minimal cues  -ML      Time frame by discharge  -ML      Progress/Outcomes new goal  -ML         SLP Diagnostic Treatment     Patient will participate in further assessment in the following areas reading comprehension;graphic expression  -ML      Time Frame (Diagnostic) short term goal (STG);by discharge  -ML      Progress/Outcomes (Additional Goal 1, SLP) new goal  -ML         Respiratory Support Goal 1 (SLP)    Improve Respiratory Support Goal 1 (SLP) repeating a sentence on exhalation;90%;with minimal cues (75-90%)  -ML      Time Frame (Respiratory Support Goal 1, SLP) short term goal (STG);by discharge  -ML      Barriers (Respiratory Support Goal 1, SLP) cognition/decreased awareness  -ML      Progress/Outcomes (Respiratory Support Goal 1, SLP) new goal  -ML         Articulation Goal 1 (SLP)    Improve Articulation Goal 1 (SLP) by over-articulating at word level;of specific sounds in words;with minimal cues (75-90%);90%  /p, b/  -ML      Time Frame (Articulation Goal 1, SLP) short term goal (STG);by discharge  -ML      Progress/Outcomes (Articulation Goal 1, SLP) new goal  -ML         Patient Will Implement Strategies Goal 1 (SLP)     Implement Strategies of Goal 1 (SLP) using external rate control;90%;with minimal cues (75-90%)  -ML      Time Frame (Strategy Implementation Goal 1, SLP) short term goal (STG);by discharge  -ML      Progress/Outcomes (Strategy Implementation Goal 1, SLP) new goal  -ML         Attention Goal 1 (SLP)    Improve Attention by Goal 1 (SLP) looking at speaker;attending to task;complete sustained attention task;90%;with minimal cues (75-90%)  -ML      Time Frame (Attention Goal 1, SLP) short term goal (STG);by discharge  -ML      Progress/Outcomes (Attention Goal 1, SLP) new goal  -ML         Memory Skills Goal 1 (SLP)    Improve Memory Skills Through Goal 1 (SLP) recalling related word lists immediately;80%;with moderate cues (50-74%)  -ML      Time Frame (Memory Skills Goal 1, SLP) short term goal (STG);by discharge  -ML      Progress/Outcomes (Memory Skills Goal 1, SLP) new goal  -ML         Reasoning Goal 1 (SLP)    Improve Reasoning Through Goal 1 (SLP) complete high level reasoning task;complete mental flexibility task;90%;with moderate cues (50-74%)  -ML      Time Frame (Reasoning Goal 1, SLP) short term goal (STG);by discharge  -ML      Progress/Outcomes (Reasoning Goal 1, SLP) new goal  -ML         Functional Problem Solving Skills Goal 1 (SLP)    Improve Problem Solving Through Goal 1 (SLP) determine solutions to multifactorial problems;determine multiple solutions to problems;80%;with minimal cues (75-90%)  -ML      Time Frame (Problem Solving Goal 1, SLP) short term goal (STG);by discharge  -ML      Progress/Outcomes (Problem Solving Goal 1, SLP) new goal  -ML         Pragmatic Skills Goal 1 (SLP)    Improve Pragmatic Skills Goal 1 (SLP) maintain eye contact;100%;independently (over 90% accuracy)  -ML      Time Frame (Pragmatic Skills Goal 1, SLP) short term goal (STG);by discharge  -ML      Progress/Outcomes (Pragmatic Skills Goal 1, SLP) new goal  -ML         Executive Functional Skills Goal 1 (SLP)     Improve Executive Function Skills Goal 1 (SLP) demonstrate awareness of deficit;identify strategies, strengths, limitations;exhibit cognitive flexibility;80%;with moderate cues (50-74%)  -ML      Time Frame (Executive Function Skills Goal 1, SLP) short term goal (STG);by discharge  -ML      Progress/Outcomes (Executive Function Skills Goal 1, SLP) new goal  -ML                User Key  (r) = Recorded By, (t) = Taken By, (c) = Cosigned By      Initials Name Provider Type    Zandra Denney CCC-SLP Speech and Language Pathologist                            Time Calculation:      Time Calculation- SLP       Row Name 23 1303             Time Calculation- SLP    SLP Start Time 0915  -ML      SLP Received On 23  -ML                User Key  (r) = Recorded By, (t) = Taken By, (c) = Cosigned By      Initials Name Provider Type    Zandra Denney CCC-SLP Speech and Language Pathologist                    Therapy Charges for Today       Code Description Service Date Service Provider Modifiers Qty    40312457089 HC ST EVAL SPEECH AND PROD W LANG  4 2023 Zandra Vasquez CCC-SLP GN 1    [id number] HC ST EVAL ORAL PHARYNG SWALLOW 3 2023 Zandra Vasquez CCC-SLP GN 1                       SAEED Hendrix  2023   and Acute Care - Speech Language Pathology   Swallow Initial Evaluation [facility]  Clinical Swallow Evaluation       Patient Name: Chano Rees  : 1970  MRN: [id number]  Today's Date: 2023               Admit Date: 2023    Visit Dx:     ICD-10-CM ICD-9-CM   1. Intracranial hemorrhage  I62.9 432.9   2. Uncontrolled hypertension  I10 401.9   3. Personal history of noncompliance with medical treatment  Z91.199 V15.81   4. Cognitive communication deficit  R41.841 799.52     Patient Active Problem List   Diagnosis    Rt Thalamic ICH    Hypertension    Non-compliance    History of alcohol abuse     Past Medical History:    Diagnosis Date    Hypertension      Past Surgical History:   Procedure Laterality Date    ANKLE SURGERY Left        SLP Recommendation and Plan  SLP Swallowing Diagnosis: mild, oral dysphagia, functional pharyngeal phase (12/28/23 0915)  SLP Diet Recommendation: regular textures, thin liquids (12/28/23 0915)  Recommended Precautions and Strategies: upright posture during/after eating, small bites of food and sips of liquid, check mouth frequently for oral residue/pocketing, general aspiration precautions, assist with feeding (Monitor rate/bite size/and cue to check oral cavity for residue in left cheek.) (12/28/23 0915)  SLP Rec. for Method of Medication Administration: meds whole, with thin liquids (12/28/23 0915)     Monitor for Signs of Aspiration: notify SLP if any concerns (12/28/23 0915)  Recommended Diagnostics: reassess via clinical swallow evaluation (12/28/23 0915)  Swallow Criteria for Skilled Therapeutic Interventions Met: demonstrates skilled criteria (12/28/23 0915)  Anticipated Discharge Disposition (SLP): inpatient rehabilitation facility (12/28/23 0915)  Rehab Potential/Prognosis, Swallowing: good, to achieve stated therapy goals (12/28/23 0915)  Therapy Frequency (Swallow): 5 days per week (12/28/23 0915)  Predicted Duration Therapy Intervention (Days): until discharge (12/28/23 0915)  Oral Care Recommendations: Oral Care BID/PRN, Suction toothbrush, Toothbrush (As pt allows) (12/28/23 0915)                                      Oral Care Recommendations: Oral Care BID/PRN, Suction toothbrush, Toothbrush (As pt allows) (12/28/23 0915)    Outcome Evaluation: Completed swallowing evaluation and recommended a PO diet. Completed a speech/language/cognitive evaluation. Needs follow up by ST for swallowing and cognitive communication/speech.  SLP to follow during acute care stay.      SWALLOW EVALUATION (last 72 hours)       SLP Adult Swallow Evaluation       Row Name 12/28/23 0915                    General Information    Current Method of Nutrition NPO  -ML        Prior Level of Function-Communication WFL  -ML        Prior Level of Function-Swallowing no diet consistency restrictions  -ML           General Eating/Swallowing Observations    Respiratory Support Currently in Use room air  -ML        Eating/Swallowing Skills self-fed;fed by SLP;needed assist;impulsive self-feeding behaviors;rate is too fast;unaware of safety concerns;requires cueing for compensatory strategies and precautions  -ML        Positioning During Eating upright in bed  -ML        Utensils Used spoon;straw  -ML        Consistencies Trialed regular textures;pureed;thin liquids  -ML           Clinical Swallow Eval    Oral Prep Phase impaired  -ML        Oral Transit WFL  -ML        Oral Residue impaired  -ML        Pharyngeal Phase no overt signs/symptoms of pharyngeal impairment  -ML        Clinical Swallow Evaluation Summary Clinical swallowing assessment completed. No anterior loss with liquids and no difficulty retrieving liquids via straw.  Anterior loss with puree when not placed on the right side of his mouth.  Able to perform a lingual sweep on command, however fails to do so during meal despite oral residue.  No pharyngeal signs with any consistency. Appears safe to return to PO diet, however supervision is recommended to ensure pt clears oral cavity during meal, to monitor rate/bite size, and to provide reminders re; placement of food on the stronger side (R).  -ML           Oral Prep Concerns    Oral Prep Concerns anterior loss  -ML        Anterior Loss pudding  -ML           Oral Residue Concerns    Oral Residue Concerns lateral sulcus residue, left  -ML        Lateral Sulcus Residue, Left pudding;regular consistencies  -ML        Oral Residue Concerns, Comment Clears with cues but not independently  -ML           SLP Evaluation Clinical Impression    SLP Swallowing Diagnosis mild;oral dysphagia;functional pharyngeal phase  -ML         Functional Impact risk of aspiration/pneumonia;risk of malnutrition  -ML        Rehab Potential/Prognosis, Swallowing good, to achieve stated therapy goals  -ML        Swallow Criteria for Skilled Therapeutic Interventions Met demonstrates skilled criteria  -ML           Recommendations    Therapy Frequency (Swallow) 5 days per week  -ML        SLP Diet Recommendation regular textures;thin liquids  -ML        Recommended Diagnostics reassess via clinical swallow evaluation  -ML        Recommended Precautions and Strategies upright posture during/after eating;small bites of food and sips of liquid;check mouth frequently for oral residue/pocketing;general aspiration precautions;assist with feeding  Monitor rate/bite size/and cue to check oral cavity for residue in left cheek.  -ML        Oral Care Recommendations Oral Care BID/PRN;Suction toothbrush;Toothbrush  As pt allows  -ML        SLP Rec. for Method of Medication Administration meds whole;with thin liquids  -ML        Monitor for Signs of Aspiration notify SLP if any concerns  -ML                  User Key  (r) = Recorded By, (t) = Taken By, (c) = Cosigned By      Initials Name Effective Dates    ML Zandra Vasquez, CCC-SLP 06/16/21 -                     EDUCATION  The patient has been educated in the following areas:   Dysphagia (Swallowing Impairment).        SLP GOALS       Row Name 12/28/23 0915             (LTG) Patient will demonstrate functional swallow for    Diet Texture (Demonstrate functional swallow) regular textures  -ML      Liquid viscosity (Demonstrate functional swallow) thin liquids  -ML      [place] (Demonstrate functional swallow) with minimal cues (75-90% accuracy)  -ML      Time Frame (Demonstrate functional swallow) 1 week  -ML      Barriers (Demonstrate functional swallow) Cognition/resistance  -ML      Progress/Outcomes (Demonstrate functional swallow) new goal  -ML         (STG) Patient will tolerate trials of     Consistencies Trialed (Tolerate trials) regular textures;thin liquids  -ML      Desired Outcome (Tolerate trials) without signs/symptoms of aspiration;with adequate oral prep/transit/clearance  -ML      [place] (Tolerate trials) with minimal cues (75-90% accuracy)  -ML      Time Frame (Tolerate trials) 1 week  -ML      Progress/Outcomes (Tolerate trials) new goal  -ML         (STG) Labial Strengthening Goal 1 (SLP)    Activity (Labial Strengthening Goal 1, SLP) increase labial tone  -ML      Increase Labial Tone labial resistance exercises  -ML      [place]/Accuracy (Labial Strengthening Goal 1, SLP) with minimal cues (75-90% accuracy)  -ML      Time Frame (Labial Strengthening Goal 1, SLP) short term goal (STG);by discharge  -ML      Progress/Outcomes (Labial Strengthening Goal 1, SLP) new goal  -ML         (STG) Lingual Strengthening Goal 1 (SLP)    Activity (Lingual Strengthening Goal 1, SLP) increase lingual tone/sensation/control/coordination/movement  -ML      Increase Lingual Tone/Sensation/Control/Coordination/Movement lingual movement exercises  -ML      [place]/Accuracy (Lingual Strengthening Goal 1, SLP) with minimal cues (75-90% accuracy)  -ML      Time Frame (Lingual Strengthening Goal 1, SLP) short term goal (STG);by discharge  -ML      Progress/Outcomes (Lingual Strengthening Goal 1, SLP) new goal  -ML         Patient will demonstrate functional speech skills for return to discharge environment    [place] Independently  -ML      Time frame by discharge  -ML      Progress/Outcomes new goal  -ML         Patient will demonstrate functional cognitive-linguistic skills for return to discharge environment    [place] with minimal cues  -ML      Time frame by discharge  -ML      Progress/Outcomes new goal  -ML         SLP Diagnostic Treatment     Patient will participate in further assessment in the following areas reading comprehension;graphic expression  -ML      Time Frame  (Diagnostic) short term goal (STG);by discharge  -ML      Progress/Outcomes (Additional Goal 1, SLP) new goal  -ML         Respiratory Support Goal 1 (SLP)    Improve Respiratory Support Goal 1 (SLP) repeating a sentence on exhalation;90%;with minimal cues (75-90%)  -ML      Time Frame (Respiratory Support Goal 1, SLP) short term goal (STG);by discharge  -ML      Barriers (Respiratory Support Goal 1, SLP) cognition/decreased awareness  -ML      Progress/Outcomes (Respiratory Support Goal 1, SLP) new goal  -ML         Articulation Goal 1 (SLP)    Improve Articulation Goal 1 (SLP) by over-articulating at word level;of specific sounds in words;with minimal cues (75-90%);90%  /p, b/  -ML      Time Frame (Articulation Goal 1, SLP) short term goal (STG);by discharge  -ML      Progress/Outcomes (Articulation Goal 1, SLP) new goal  -ML         Patient Will Implement Strategies Goal 1 (SLP)    Implement Strategies of Goal 1 (SLP) using external rate control;90%;with minimal cues (75-90%)  -ML      Time Frame (Strategy Implementation Goal 1, SLP) short term goal (STG);by discharge  -ML      Progress/Outcomes (Strategy Implementation Goal 1, SLP) new goal  -ML         Attention Goal 1 (SLP)    Improve Attention by Goal 1 (SLP) looking at speaker;attending to task;complete sustained attention task;90%;with minimal cues (75-90%)  -ML      Time Frame (Attention Goal 1, SLP) short term goal (STG);by discharge  -ML      Progress/Outcomes (Attention Goal 1, SLP) new goal  -ML         Memory Skills Goal 1 (SLP)    Improve Memory Skills Through Goal 1 (SLP) recalling related word lists immediately;80%;with moderate cues (50-74%)  -ML      Time Frame (Memory Skills Goal 1, SLP) short term goal (STG);by discharge  -ML      Progress/Outcomes (Memory Skills Goal 1, SLP) new goal  -ML         Reasoning Goal 1 (SLP)    Improve Reasoning Through Goal 1 (SLP) complete high level reasoning task;complete mental flexibility task;90%;with  moderate cues (50-74%)  -ML      Time Frame (Reasoning Goal 1, SLP) short term goal (STG);by discharge  -ML      Progress/Outcomes (Reasoning Goal 1, SLP) new goal  -ML         Functional Problem Solving Skills Goal 1 (SLP)    Improve Problem Solving Through Goal 1 (SLP) determine solutions to multifactorial problems;determine multiple solutions to problems;80%;with minimal cues (75-90%)  -ML      Time Frame (Problem Solving Goal 1, SLP) short term goal (STG);by discharge  -ML      Progress/Outcomes (Problem Solving Goal 1, SLP) new goal  -ML         Pragmatic Skills Goal 1 (SLP)    Improve Pragmatic Skills Goal 1 (SLP) maintain eye contact;100%;independently (over 90% accuracy)  -ML      Time Frame (Pragmatic Skills Goal 1, SLP) short term goal (STG);by discharge  -ML      Progress/Outcomes (Pragmatic Skills Goal 1, SLP) new goal  -ML         Executive Functional Skills Goal 1 (SLP)    Improve Executive Function Skills Goal 1 (SLP) demonstrate awareness of deficit;identify strategies, strengths, limitations;exhibit cognitive flexibility;80%;with moderate cues (50-74%)  -ML      Time Frame (Executive Function Skills Goal 1, SLP) short term goal (STG);by discharge  -ML      Progress/Outcomes (Executive Function Skills Goal 1, SLP) new goal  -ML                User Key  (r) = Recorded By, (t) = Taken By, (c) = Cosigned By      Initials Name Provider Type    Zandra Denney CCC-SLP Speech and Language Pathologist                       Time Calculation:    Time Calculation- SLP       Row Name 12/28/23 1303             Time Calculation- SLP    SLP Start Time 0915  -ML      SLP Received On 12/28/23  -ML                User Key  (r) = Recorded By, (t) = Taken By, (c) = Cosigned By      Initials Name Provider Type    Zandra Denney CCC-SLP Speech and Language Pathologist                    Therapy Charges for Today       Code Description Service Date Service Provider Modifiers Qty    [id number] [facility]  EVAL SPEECH AND PROD W LANG  4 12/28/2023 Zandra Vasquez, CCC-SLP GN 1    [id number] [facility] EVAL ORAL PHARYNG SWALLOW 3 12/28/2023 Zandra Vasquez, CCC-SLP GN 1                 Zandra Vasquez CCC-SLP  12/28/2023

## 2023-12-29 ENCOUNTER — APPOINTMENT (OUTPATIENT)
Dept: GENERAL RADIOLOGY | Facility: HOSPITAL | Age: 53
End: 2023-12-29
Payer: COMMERCIAL

## 2023-12-29 ENCOUNTER — APPOINTMENT (OUTPATIENT)
Dept: CT IMAGING | Facility: HOSPITAL | Age: 53
End: 2023-12-29
Payer: COMMERCIAL

## 2023-12-29 LAB
AMPHET+METHAMPHET UR QL: NEGATIVE
AMPHETAMINES UR QL: NEGATIVE
ANION GAP SERPL CALCULATED.3IONS-SCNC: 10 MMOL/L (ref 5–15)
ARTERIAL PATENCY WRIST A: ABNORMAL
ATMOSPHERIC PRESS: ABNORMAL MM[HG]
BACTERIA UR QL AUTO: ABNORMAL /HPF
BARBITURATES UR QL SCN: NEGATIVE
BASE EXCESS BLDA CALC-SCNC: 1.2 MMOL/L (ref 0–2)
BASOPHILS # BLD AUTO: 0.05 10*3/MM3 (ref 0–0.2)
BASOPHILS NFR BLD AUTO: 0.3 % (ref 0–1.5)
BDY SITE: ABNORMAL
BENZODIAZ UR QL SCN: POSITIVE
BILIRUB UR QL STRIP: NEGATIVE
BODY TEMPERATURE: 37
BUN SERPL-MCNC: 20 MG/DL (ref 6–20)
BUN/CREAT SERPL: 15.4 (ref 7–25)
BUPRENORPHINE SERPL-MCNC: NEGATIVE NG/ML
CALCIUM SPEC-SCNC: 9.4 MG/DL (ref 8.6–10.5)
CANNABINOIDS SERPL QL: NEGATIVE
CHLORIDE SERPL-SCNC: 106 MMOL/L (ref 98–107)
CLARITY UR: CLEAR
CO2 BLDA-SCNC: 22.6 MMOL/L (ref 22–33)
CO2 SERPL-SCNC: 23 MMOL/L (ref 22–29)
COCAINE UR QL: NEGATIVE
COHGB MFR BLD: 1.5 % (ref 0–2)
COLOR UR: YELLOW
CREAT SERPL-MCNC: 1.3 MG/DL (ref 0.76–1.27)
D-LACTATE SERPL-SCNC: 3.9 MMOL/L (ref 0.5–2)
DEPRECATED RDW RBC AUTO: 48.7 FL (ref 37–54)
EGFRCR SERPLBLD CKD-EPI 2021: 65.7 ML/MIN/1.73
EOSINOPHIL # BLD AUTO: 0.02 10*3/MM3 (ref 0–0.4)
EOSINOPHIL NFR BLD AUTO: 0.1 % (ref 0.3–6.2)
EPAP: 0
ERYTHROCYTE [DISTWIDTH] IN BLOOD BY AUTOMATED COUNT: 14.4 % (ref 12.3–15.4)
FENTANYL UR-MCNC: NEGATIVE NG/ML
GLUCOSE BLDC GLUCOMTR-MCNC: 105 MG/DL (ref 70–130)
GLUCOSE SERPL-MCNC: 117 MG/DL (ref 65–99)
GLUCOSE UR STRIP-MCNC: NEGATIVE MG/DL
HCO3 BLDA-SCNC: 21.9 MMOL/L (ref 20–26)
HCT VFR BLD AUTO: 47.1 % (ref 37.5–51)
HCT VFR BLD CALC: 50.7 % (ref 38–51)
HGB BLD-MCNC: 15.3 G/DL (ref 13–17.7)
HGB BLDA-MCNC: 16.5 G/DL (ref 13.5–17.5)
HGB UR QL STRIP.AUTO: NEGATIVE
HYALINE CASTS UR QL AUTO: ABNORMAL /LPF
IMM GRANULOCYTES # BLD AUTO: 0.16 10*3/MM3 (ref 0–0.05)
IMM GRANULOCYTES NFR BLD AUTO: 0.8 % (ref 0–0.5)
INHALED O2 CONCENTRATION: 28 %
IPAP: 0
KETONES UR QL STRIP: NEGATIVE
LEUKOCYTE ESTERASE UR QL STRIP.AUTO: ABNORMAL
LYMPHOCYTES # BLD AUTO: 1.37 10*3/MM3 (ref 0.7–3.1)
LYMPHOCYTES NFR BLD AUTO: 7.1 % (ref 19.6–45.3)
MCH RBC QN AUTO: 29.5 PG (ref 26.6–33)
MCHC RBC AUTO-ENTMCNC: 32.5 G/DL (ref 31.5–35.7)
MCV RBC AUTO: 90.9 FL (ref 79–97)
METHADONE UR QL SCN: NEGATIVE
METHGB BLD QL: 0 % (ref 0–1.5)
MODALITY: ABNORMAL
MONOCYTES # BLD AUTO: 1.91 10*3/MM3 (ref 0.1–0.9)
MONOCYTES NFR BLD AUTO: 9.9 % (ref 5–12)
NEUTROPHILS NFR BLD AUTO: 15.79 10*3/MM3 (ref 1.7–7)
NEUTROPHILS NFR BLD AUTO: 81.8 % (ref 42.7–76)
NITRITE UR QL STRIP: NEGATIVE
NRBC BLD AUTO-RTO: 0 /100 WBC (ref 0–0.2)
OPIATES UR QL: NEGATIVE
OXYCODONE UR QL SCN: NEGATIVE
OXYHGB MFR BLDV: 93.2 % (ref 94–99)
PAW @ PEAK INSP FLOW SETTING VENT: 0 CMH2O
PCO2 BLDA: 25.3 MM HG (ref 35–45)
PCO2 TEMP ADJ BLD: 25.3 MM HG (ref 35–48)
PCP UR QL SCN: NEGATIVE
PH BLDA: 7.54 PH UNITS (ref 7.35–7.45)
PH UR STRIP.AUTO: <=5 [PH] (ref 5–8)
PH, TEMP CORRECTED: 7.54 PH UNITS
PLATELET # BLD AUTO: 264 10*3/MM3 (ref 140–450)
PMV BLD AUTO: 10.5 FL (ref 6–12)
PO2 BLDA: 59.3 MM HG (ref 83–108)
PO2 TEMP ADJ BLD: 59.3 MM HG (ref 83–108)
POTASSIUM SERPL-SCNC: 4.6 MMOL/L (ref 3.5–5.2)
PROT UR QL STRIP: NEGATIVE
RBC # BLD AUTO: 5.18 10*6/MM3 (ref 4.14–5.8)
RBC # UR STRIP: ABNORMAL /HPF
REF LAB TEST METHOD: ABNORMAL
SODIUM SERPL-SCNC: 139 MMOL/L (ref 136–145)
SP GR UR STRIP: 1.02 (ref 1–1.03)
SQUAMOUS #/AREA URNS HPF: ABNORMAL /HPF
TOTAL RATE: 0 BREATHS/MINUTE
TRICYCLICS UR QL SCN: NEGATIVE
UROBILINOGEN UR QL STRIP: ABNORMAL
WBC # UR STRIP: ABNORMAL /HPF
WBC NRBC COR # BLD AUTO: 19.3 10*3/MM3 (ref 3.4–10.8)

## 2023-12-29 PROCEDURE — 99233 SBSQ HOSP IP/OBS HIGH 50: CPT | Performed by: PSYCHIATRY & NEUROLOGY

## 2023-12-29 PROCEDURE — 70450 CT HEAD/BRAIN W/O DYE: CPT

## 2023-12-29 PROCEDURE — 25010000002 PIPERACILLIN SOD-TAZOBACTAM PER 1 G: Performed by: NURSE PRACTITIONER

## 2023-12-29 PROCEDURE — 25010000002 THIAMINE PER 100 MG: Performed by: INTERNAL MEDICINE

## 2023-12-29 PROCEDURE — 36600 WITHDRAWAL OF ARTERIAL BLOOD: CPT

## 2023-12-29 PROCEDURE — 85025 COMPLETE CBC W/AUTO DIFF WBC: CPT | Performed by: INTERNAL MEDICINE

## 2023-12-29 PROCEDURE — 25810000003 SODIUM CHLORIDE 0.9 % SOLUTION: Performed by: NURSE PRACTITIONER

## 2023-12-29 PROCEDURE — 80048 BASIC METABOLIC PNL TOTAL CA: CPT | Performed by: PSYCHIATRY & NEUROLOGY

## 2023-12-29 PROCEDURE — 87040 BLOOD CULTURE FOR BACTERIA: CPT | Performed by: NURSE PRACTITIONER

## 2023-12-29 PROCEDURE — 99232 SBSQ HOSP IP/OBS MODERATE 35: CPT | Performed by: INTERNAL MEDICINE

## 2023-12-29 PROCEDURE — 25810000003 SODIUM CHLORIDE 0.9 % SOLUTION 250 ML FLEX CONT: Performed by: EMERGENCY MEDICINE

## 2023-12-29 PROCEDURE — 82375 ASSAY CARBOXYHB QUANT: CPT

## 2023-12-29 PROCEDURE — 83605 ASSAY OF LACTIC ACID: CPT | Performed by: NURSE PRACTITIONER

## 2023-12-29 PROCEDURE — 25010000002 MIDAZOLAM PER 1 MG: Performed by: INTERNAL MEDICINE

## 2023-12-29 PROCEDURE — 80307 DRUG TEST PRSMV CHEM ANLYZR: CPT | Performed by: NURSE PRACTITIONER

## 2023-12-29 PROCEDURE — 82805 BLOOD GASES W/O2 SATURATION: CPT

## 2023-12-29 PROCEDURE — 71045 X-RAY EXAM CHEST 1 VIEW: CPT

## 2023-12-29 PROCEDURE — 0DH67UZ INSERTION OF FEEDING DEVICE INTO STOMACH, VIA NATURAL OR ARTIFICIAL OPENING: ICD-10-PCS | Performed by: INTERNAL MEDICINE

## 2023-12-29 PROCEDURE — 25010000002 HYDRALAZINE PER 20 MG

## 2023-12-29 PROCEDURE — 82948 REAGENT STRIP/BLOOD GLUCOSE: CPT

## 2023-12-29 PROCEDURE — 81001 URINALYSIS AUTO W/SCOPE: CPT | Performed by: NURSE PRACTITIONER

## 2023-12-29 PROCEDURE — 83050 HGB METHEMOGLOBIN QUAN: CPT

## 2023-12-29 PROCEDURE — 74018 RADEX ABDOMEN 1 VIEW: CPT

## 2023-12-29 RX ORDER — LORAZEPAM 1 MG/1
2 TABLET ORAL
Status: DISCONTINUED | OUTPATIENT
Start: 2023-12-29 | End: 2023-12-29

## 2023-12-29 RX ORDER — DEXTROSE MONOHYDRATE 25 G/50ML
25 INJECTION, SOLUTION INTRAVENOUS
Status: DISCONTINUED | OUTPATIENT
Start: 2023-12-29 | End: 2024-01-08 | Stop reason: HOSPADM

## 2023-12-29 RX ORDER — MIDAZOLAM HYDROCHLORIDE 1 MG/ML
2 INJECTION INTRAMUSCULAR; INTRAVENOUS
Status: DISPENSED | OUTPATIENT
Start: 2023-12-29 | End: 2024-01-04

## 2023-12-29 RX ORDER — LORAZEPAM 1 MG/1
2 TABLET ORAL
Status: DISPENSED | OUTPATIENT
Start: 2023-12-29 | End: 2024-01-04

## 2023-12-29 RX ORDER — MIDAZOLAM HYDROCHLORIDE 1 MG/ML
2 INJECTION INTRAMUSCULAR; INTRAVENOUS
Status: DISCONTINUED | OUTPATIENT
Start: 2023-12-29 | End: 2023-12-29

## 2023-12-29 RX ORDER — LORAZEPAM 1 MG/1
1 TABLET ORAL
Status: DISPENSED | OUTPATIENT
Start: 2023-12-29 | End: 2024-01-04

## 2023-12-29 RX ORDER — LISINOPRIL 10 MG/1
10 TABLET ORAL
Status: DISCONTINUED | OUTPATIENT
Start: 2023-12-30 | End: 2023-12-31

## 2023-12-29 RX ORDER — METOPROLOL TARTRATE 50 MG/1
50 TABLET, FILM COATED ORAL EVERY 12 HOURS SCHEDULED
Status: DISCONTINUED | OUTPATIENT
Start: 2023-12-29 | End: 2023-12-29

## 2023-12-29 RX ORDER — ACETAMINOPHEN 650 MG/1
650 SUPPOSITORY RECTAL EVERY 6 HOURS PRN
Status: DISCONTINUED | OUTPATIENT
Start: 2023-12-29 | End: 2023-12-30

## 2023-12-29 RX ORDER — METOPROLOL TARTRATE 50 MG/1
50 TABLET, FILM COATED ORAL EVERY 12 HOURS SCHEDULED
Status: DISCONTINUED | OUTPATIENT
Start: 2023-12-30 | End: 2023-12-29

## 2023-12-29 RX ORDER — IBUPROFEN 600 MG/1
1 TABLET ORAL
Status: DISCONTINUED | OUTPATIENT
Start: 2023-12-29 | End: 2024-01-08 | Stop reason: HOSPADM

## 2023-12-29 RX ORDER — ACETAMINOPHEN 325 MG/1
650 TABLET ORAL EVERY 6 HOURS PRN
Status: DISCONTINUED | OUTPATIENT
Start: 2023-12-29 | End: 2023-12-29

## 2023-12-29 RX ORDER — ACETAMINOPHEN 160 MG/5ML
650 SOLUTION ORAL EVERY 6 HOURS PRN
Status: DISCONTINUED | OUTPATIENT
Start: 2023-12-29 | End: 2023-12-30

## 2023-12-29 RX ORDER — SODIUM CHLORIDE 9 MG/ML
75 INJECTION, SOLUTION INTRAVENOUS CONTINUOUS
Status: DISCONTINUED | OUTPATIENT
Start: 2023-12-29 | End: 2023-12-31

## 2023-12-29 RX ORDER — METOPROLOL TARTRATE 50 MG/1
50 TABLET, FILM COATED ORAL EVERY 12 HOURS SCHEDULED
Status: DISCONTINUED | OUTPATIENT
Start: 2023-12-29 | End: 2024-01-03

## 2023-12-29 RX ORDER — LORAZEPAM 1 MG/1
1 TABLET ORAL
Status: DISCONTINUED | OUTPATIENT
Start: 2023-12-29 | End: 2023-12-29

## 2023-12-29 RX ORDER — LISINOPRIL 10 MG/1
10 TABLET ORAL
Status: DISCONTINUED | OUTPATIENT
Start: 2023-12-30 | End: 2023-12-29

## 2023-12-29 RX ORDER — ACETAMINOPHEN 650 MG/1
650 SUPPOSITORY RECTAL EVERY 6 HOURS PRN
Status: DISCONTINUED | OUTPATIENT
Start: 2023-12-29 | End: 2023-12-29

## 2023-12-29 RX ORDER — NICOTINE POLACRILEX 4 MG
15 LOZENGE BUCCAL
Status: DISCONTINUED | OUTPATIENT
Start: 2023-12-29 | End: 2023-12-29 | Stop reason: SDUPTHER

## 2023-12-29 RX ORDER — HYDRALAZINE HYDROCHLORIDE 20 MG/ML
40 INJECTION INTRAMUSCULAR; INTRAVENOUS ONCE
Status: COMPLETED | OUTPATIENT
Start: 2023-12-29 | End: 2023-12-29

## 2023-12-29 RX ADMIN — NICARDIPINE HYDROCHLORIDE 12.5 MG/HR: 25 INJECTION, SOLUTION INTRAVENOUS at 07:44

## 2023-12-29 RX ADMIN — METOPROLOL TARTRATE 25 MG: 25 TABLET, FILM COATED ORAL at 18:22

## 2023-12-29 RX ADMIN — Medication 10 ML: at 19:37

## 2023-12-29 RX ADMIN — LORAZEPAM 2 MG: 1 TABLET ORAL at 06:27

## 2023-12-29 RX ADMIN — SODIUM CHLORIDE 250 ML: 9 INJECTION, SOLUTION INTRAVENOUS at 23:02

## 2023-12-29 RX ADMIN — NICARDIPINE HYDROCHLORIDE 7.5 MG/HR: 25 INJECTION, SOLUTION INTRAVENOUS at 04:02

## 2023-12-29 RX ADMIN — MIDAZOLAM HYDROCHLORIDE 2 MG: 1 INJECTION, SOLUTION INTRAMUSCULAR; INTRAVENOUS at 20:35

## 2023-12-29 RX ADMIN — MIDAZOLAM HYDROCHLORIDE 2 MG: 1 INJECTION, SOLUTION INTRAMUSCULAR; INTRAVENOUS at 12:13

## 2023-12-29 RX ADMIN — LORAZEPAM 2 MG: 1 TABLET ORAL at 14:32

## 2023-12-29 RX ADMIN — LABETALOL HYDROCHLORIDE 20 MG: 5 INJECTION INTRAVENOUS at 14:32

## 2023-12-29 RX ADMIN — Medication 10 ML: at 08:14

## 2023-12-29 RX ADMIN — Medication 1 TABLET: at 08:12

## 2023-12-29 RX ADMIN — LORAZEPAM 2 MG: 1 TABLET ORAL at 02:35

## 2023-12-29 RX ADMIN — Medication 1 PATCH: at 08:12

## 2023-12-29 RX ADMIN — LISINOPRIL 10 MG: 10 TABLET ORAL at 08:12

## 2023-12-29 RX ADMIN — Medication 10 ML: at 21:05

## 2023-12-29 RX ADMIN — NICARDIPINE HYDROCHLORIDE 12.5 MG/HR: 25 INJECTION, SOLUTION INTRAVENOUS at 18:30

## 2023-12-29 RX ADMIN — SODIUM CHLORIDE 75 ML/HR: 9 INJECTION, SOLUTION INTRAVENOUS at 23:19

## 2023-12-29 RX ADMIN — HYDRALAZINE HYDROCHLORIDE 40 MG: 20 INJECTION INTRAMUSCULAR; INTRAVENOUS at 19:37

## 2023-12-29 RX ADMIN — FOLIC ACID 1 MG: 5 INJECTION, SOLUTION INTRAMUSCULAR; INTRAVENOUS; SUBCUTANEOUS at 08:13

## 2023-12-29 RX ADMIN — PIPERACILLIN SODIUM AND TAZOBACTAM SODIUM 3.38 G: 3; .375 INJECTION, SOLUTION INTRAVENOUS at 21:38

## 2023-12-29 RX ADMIN — LORAZEPAM 2 MG: 1 TABLET ORAL at 08:12

## 2023-12-29 RX ADMIN — METOPROLOL TARTRATE 50 MG: 50 TABLET ORAL at 23:03

## 2023-12-29 RX ADMIN — LABETALOL HYDROCHLORIDE 20 MG: 5 INJECTION INTRAVENOUS at 07:44

## 2023-12-29 RX ADMIN — NICARDIPINE HYDROCHLORIDE 5 MG/HR: 25 INJECTION, SOLUTION INTRAVENOUS at 00:01

## 2023-12-29 RX ADMIN — NICARDIPINE HYDROCHLORIDE 10 MG/HR: 25 INJECTION, SOLUTION INTRAVENOUS at 12:40

## 2023-12-29 RX ADMIN — NICARDIPINE HYDROCHLORIDE 15 MG/HR: 25 INJECTION, SOLUTION INTRAVENOUS at 22:10

## 2023-12-29 RX ADMIN — ACETAMINOPHEN 650 MG: 650 SUPPOSITORY RECTAL at 20:40

## 2023-12-29 RX ADMIN — NICARDIPINE HYDROCHLORIDE 15 MG/HR: 25 INJECTION, SOLUTION INTRAVENOUS at 20:05

## 2023-12-29 RX ADMIN — LABETALOL HYDROCHLORIDE 20 MG: 5 INJECTION INTRAVENOUS at 18:30

## 2023-12-29 RX ADMIN — METOPROLOL TARTRATE 25 MG: 25 TABLET, FILM COATED ORAL at 08:13

## 2023-12-29 RX ADMIN — NICARDIPINE HYDROCHLORIDE 10 MG/HR: 25 INJECTION, SOLUTION INTRAVENOUS at 10:02

## 2023-12-29 RX ADMIN — THIAMINE HYDROCHLORIDE 100 MG: 100 INJECTION, SOLUTION INTRAMUSCULAR; INTRAVENOUS at 08:12

## 2023-12-29 NOTE — PROGRESS NOTES
"INTENSIVIST NOTE     Hospital Day: 2    Mr. Chano Rees, 53 y.o. male is followed for:   ICH       SUBJECTIVE     Interval history:    Maximum temperature 98.7.  Fluid balance +1.4 L.  Neurologic status waxes and wanes.  Family at bedside.    The patient's relevant past medical, surgical and social history were reviewed and updated in Epic as appropriate.       OBJECTIVE     Vital Sign Min/Max for last 24 hours  Temp  Min: 97.7 °F (36.5 °C)  Max: 98.7 °F (37.1 °C)   BP  Min: 113/91  Max: 153/86   Pulse  Min: 80  Max: 110   Resp  Min: 20  Max: 24   SpO2  Min: 90 %  Max: 97 %   No data recorded   No data recorded      Intake/Output Summary (Last 24 hours) at 12/29/2023 1210  Last data filed at 12/29/2023 1002  Gross per 24 hour   Intake 2547 ml   Output 1100 ml   Net 1447 ml      Flowsheet Rows      Flowsheet Row First Filed Value   Admission Height 177.8 cm (70\") Documented at 12/27/2023 2152   Admission Weight 125 kg (275 lb) Documented at 12/27/2023 2152               12/27/23  2152 12/28/23  0101   Weight: 125 kg (275 lb) 126 kg (278 lb 7.1 oz)            Objective:  General Appearance:  In no acute distress.    Vital signs: (most recent): Blood pressure 140/87, pulse 94, temperature 98.7 °F (37.1 °C), temperature source Oral, resp. rate 24, height 180.3 cm (71\"), weight 126 kg (278 lb 7.1 oz), SpO2 91%.    HEENT: Normal HEENT exam.    Lungs:  Normal effort and normal respiratory rate.  Breath sounds clear to auscultation.  He is not in respiratory distress.  No rales, wheezes or rhonchi.    Heart: Normal rate.  Regular rhythm.  S1 normal and S2 normal.  No murmur, gallop or friction rub.   Chest: Symmetric chest wall expansion.   Abdomen: Abdomen is soft and non-distended.  Bowel sounds are normal.   There is no abdominal tenderness.   There is no mass. There is no splenomegaly. There is no hepatomegaly.   Extremities: There is no deformity or dependent edema.    Pupils:  Pupils are equal, round, and " reactive to light.    Skin:  Warm and dry.            Interval:  (handoff)  1a. Level of Consciousness: 0-->Alert, keenly responsive  1b. LOC Questions: 0-->Answers both questions correctly  1c. LOC Commands: 0-->Performs both tasks correctly  2. Best Gaze: 1-->Partial gaze palsy, gaze is abnormal in one or both eyes, but forced deviation or total gaze paresis is not present  3. Visual: 1-->Partial hemianopia  4. Facial Palsy: 2-->Partial paralysis (total or near-total paralysis of lower face)  5a. Motor Arm, Left: 2-->Some effort against gravity, limb cannot get to or maintain (if cued) 90 (or 45) degrees, drifts down to bed, but has some effort against gravity  5b. Motor Arm, Right: 0-->No drift, limb holds 90 (or 45) degrees for full 10 secs  6a. Motor Leg, Left: 1-->Drift, leg falls by the end of the 5-sec period but does not hit bed  6b. Motor Leg, Right: 0-->No drift, leg holds 30 degree position for full 5 secs  7. Limb Ataxia: 1-->Present in one limb  8. Sensory: 1-->Mild-to-moderate sensory loss, patient feels pinprick is less sharp or is dull on the affected side, or there is a loss of superficial pain with pinprick, but patient is aware of being touched  9. Best Language: 0-->No aphasia, normal  10. Dysarthria: 1-->Mild-to-moderate dysarthria, patient slurs at least some words and, at worst, can be understood with some difficulty  11. Extinction and Inattention (formerly Neglect): 2-->Profound lamberto-inattention/extinction more than 1 modality    Total (NIH Stroke Scale): 12     I reviewed the patient's new clinical results.  I reviewed the patient's new imaging results/reports including actual images and agree with reports.    CT Head Without Contrast    Result Date: 12/29/2023  Impression: Stable CT head. Stable 3 cm right thalamic hemorrhage with right to left shift of 3 mm. Electronically Signed: Christoph Richardson MD  12/29/2023 1:12 AM EST  Workstation ID: [id number]    MRI Brain Without Contrast    Result  Date: 12/28/2023  Impression: Please note this examination is significantly motion degraded. Within these confines, there is overall similar size/volume of the right thalamic hemorrhage and small volume intraventricular blood products in the right ventricle. Similar 2-3 mm midline shift. No gross evidence of new acute intracranial process otherwise, though majority of sequences are nondiagnostic. Electronically Signed: Victor M Guevara MD  12/28/2023 11:19 PM EST  Workstation ID: [id number]    CT Head Without Contrast    Result Date: 12/28/2023  Impression: 1. No significant change in right thalamic hemorrhage measuring around 3 cm in diameter with mild right to left midline shift. Electronically Signed: Tj Tyson MD  12/28/2023 12:12 PM EST  Workstation ID: [id number]    CT Head Without Contrast    Result Date: 12/28/2023  Impression: Very slight increase in the size of the right thalamic hemorrhage 3.2 cm in diameter previously 3 cm with 2 mm of right to left midline shift and a trace amount of posterior horn right lateral ventricle intraventricular hemorrhage. Electronically Signed: Christoph Richardson MD  12/28/2023 5:33 AM EST  Workstation ID: [id number]    XR Chest 1 View    Result Date: 12/27/2023  Impression: No focal consolidation. Enlarged cardiac silhouette and ectatic thoracic aorta. Electronically Signed: Victor M Guevara MD  12/27/2023 10:17 PM EST  Workstation ID: [id number]    CT Angiogram Head w AI Analysis of LVO    Result Date: 12/27/2023  Impression: No evidence of large vessel occlusion, flow-limiting stenosis, dissection, or aneurysm. Electronically Signed: Victor M Guevara MD  12/27/2023 9:46 PM EST  Workstation ID: [id number]    CT Angiogram Neck    Result Date: 12/27/2023  Impression: No evidence of large vessel occlusion, flow-limiting stenosis, dissection, or aneurysm. Electronically Signed: Victor M Guevara MD  12/27/2023 9:46 PM EST  Workstation ID: [id number]    CT Head Without Contrast Stroke Protocol    Result  Date: 12/27/2023  Impression: Right thalamic hemorrhage measuring up to 3 cm. Electronically Signed: Victor M Guevara MD  12/27/2023 9:36 PM EST  Workstation ID: [id number]      INFUSIONS  niCARdipine, 5-15 mg/hr, Last Rate: 12.5 mg/hr (12/29/23 1050)        Results from last 7 days   Lab Units 12/29/23  0430 12/27/23  2358 12/27/23  2117   WBC 10*3/mm3 19.30* 11.19* 9.71   HEMOGLOBIN g/dL 15.3 17.1 16.3   HEMATOCRIT % 47.1 50.0 49.2   PLATELETS 10*3/mm3 264 272 249     Results from last 7 days   Lab Units 12/29/23  0430 12/28/23  1804 12/28/23  0612 12/27/23  2358   SODIUM mmol/L 139 142  142 139 139  139   POTASSIUM mmol/L 4.6 4.3 4.6 4.0  4.0   CHLORIDE mmol/L 106 107 105 104  104   CO2 mmol/L 23.0 18.0* 17.0* 22.0  22.0   BUN mg/dL 20 21* 17 12  12   GLUCOSE mg/dL 117* 163* 165* 133*  133*   CREATININE mg/dL 1.30* 1.53* 1.44* 1.28*  1.28*   MAGNESIUM mg/dL  --   --   --  2.3   CALCIUM mg/dL 9.4 9.6 9.7 9.8  9.8   ALBUMIN g/dL  --   --   --  4.3               Patient isn't on Tube Feeding   /h  Patient doesn't have any tube feeding modular orders    Mechanical Ventilator:   Settings: Observed:                                                I reviewed the patient's medications.    Assessment & Plan   ASSESSMENT/PLAN     Active Hospital Problems    Diagnosis     **Rt Thalamic ICH     Hypertension     Non-compliance     History of alcohol abuse        53-year-old male with a past medical history significant for hypertension and poor medical compliance.  He presented to the ER on 12/27 with sudden onset of left-sided weakness and headache.  Blood pressure in /139 initially.  CT of the head revealed a 3 cm right thalamic ICH.  CTA was unremarkable and was felt this was a hypertensive bleed.  He was admitted to the ICU for blood pressure control.  He has a history of alcoholism but reportedly has not been drinking recently    Neurologic status today waxes and wanes significantly.  He can be awake and alert  and eating and then be asleep and difficult to arouse shortly thereafter and then be alert again another hour later.  Head CT stable with 3 cm right thalamic hemorrhage with a right to left shift of 3 8 mm.  On a Cardene drip for blood pressure control within parameters.  Hypertonic saline has been discontinued.  Creatinine roughly at recent baseline.    Plan:    Goal SBP less than 140 mmHg  Cardene drip as needed  Oral Lopressor and lisinopril  Nicotine patch  Thiamine and folate  Family insist that he has not been drinking alcohol recently.  Will leave the as needed CIWA scale in place but not schedule any benzodiazepines     I discussed the patient's findings and my recommendations with patient, family, and nursing staff     Plan of care and goals reviewed with multidisciplinary team at daily rounds.    High level of risk due to:  Acute illness with threat to life or bodily function.    Edvin Child MD  Pulmonary and Critical Care Medicine  12/29/23 12:10 EST

## 2023-12-29 NOTE — PROGRESS NOTES
Neurology Note    Patient:  Chano Rees    YOB: 1970    REFERRING PHYSICIAN:  Dr. Ziegler    CHIEF COMPLAINT:    Left-sided weakness    HISTORY OF PRESENT ILLNESS:   The patient has been intermittently agitated, getting prn Versed. He has reported daily alcohol abuse but the family denies, state that he is being goofy. NIHSS 12 this am.    Past Medical History:  Past Medical History:   Diagnosis Date    Hypertension        Past Surgical History:  Past Surgical History:   Procedure Laterality Date    ANKLE SURGERY Left        Social History:   Social History     Socioeconomic History    Marital status: Unknown   Tobacco Use    Smoking status: Former     Types: Cigarettes    Smokeless tobacco: Current     Types: Chew   Vaping Use    Vaping Use: Never used   Substance and Sexual Activity    Alcohol use: Not Currently    Drug use: Not Currently    Sexual activity: Defer        Family History:   History reviewed. No pertinent family history.    Medications Prior to Admission:    Prior to Admission medications    Not on File       Allergies:  Shellfish-derived products      Review of system  Review of Systems   Unable to perform ROS: Mental status change       Vitals:    12/29/23 1145   BP: 140/87   Pulse: 94   Resp:    Temp:    SpO2: 91%       Physical exam  Physical Exam  Eyes:      Extraocular Movements: Extraocular movements intact.      Pupils: Pupils are equal, round, and reactive to light.   Cardiovascular:      Rate and Rhythm: Normal rate and regular rhythm.   Pulmonary:      Effort: Pulmonary effort is normal.   Neurological:      Deep Tendon Reflexes: Babinski sign absent on the right side. Babinski sign present on the left side.      Comments: Drowsy, alerts to voice, oriented to self, restless, left facial droop, moves right side well, left side some w/o gravity.           Lab Results   Component Value Date    WBC 19.30 (H) 12/29/2023    HGB 15.3 12/29/2023    HCT 47.1 12/29/2023     MCV 90.9 12/29/2023     12/29/2023     Lab Results   Component Value Date    GLUCOSE 117 (H) 12/29/2023    BUN 20 12/29/2023    CREATININE 1.30 (H) 12/29/2023    BCR 15.4 12/29/2023    CO2 23.0 12/29/2023    CALCIUM 9.4 12/29/2023    ALBUMIN 4.3 12/27/2023    AST 28 12/27/2023    ALT 41 12/27/2023         Radiological Studies:   CT Head Without Contrast    Result Date: 12/29/2023  CT HEAD WO CONTRAST Date of Exam: 12/29/2023 12:22 AM EST Indication: Anisocoria Drowsiness. Comparison: CT scan of the head dated December 28, 2023 at 11:56 a.m. Technique: Axial CT images were obtained of the head without contrast administration.  Automated exposure control and iterative construction methods were used. Findings: There is been no significant change in the acute right thalamic hemorrhage. Maximum diameter is about 3 cm. Small amount of intraventricular hemorrhage is seen in the posterior horn the right lateral ventricle, unchanged. The degree of right to left shift is stable at about 3 mm. Iodinated contrast is again identified in the cerebral arteries. The paranasal sinuses are clear. There are no new areas of hemorrhage or abnormal extra-axial fluid collections.     Impression: Stable CT head. Stable 3 cm right thalamic hemorrhage with right to left shift of 3 mm. Electronically Signed: Christoph Richardson MD  12/29/2023 1:12 AM EST  Workstation ID: [id number]    MRI Brain Without Contrast    Result Date: 12/28/2023  MRI BRAIN WO CONTRAST Date of Exam: 12/28/2023 10:06 PM EST Indication: Stroke, follow up.  Comparison: Same day head CT. Technique:  Routine multiplanar/multisequence sequence images of the brain were obtained without contrast administration. Please note this examination is significantly motion degraded. Findings: Within the confines of motion artifact as above, similar size of the right thalamic hemorrhage measuring around 3 cm in maximum dimension. Similar small volume layering blood products in the right  lateral ventricle. Ventricular caliber is unchanged. Similar 2 to 3 mm of midline shift. No discrete evidence of restricted diffusion otherwise to suggest acute/subacute infarction.     Impression: Please note this examination is significantly motion degraded. Within these confines, there is overall similar size/volume of the right thalamic hemorrhage and small volume intraventricular blood products in the right ventricle. Similar 2-3 mm midline shift. No gross evidence of new acute intracranial process otherwise, though majority of sequences are nondiagnostic. Electronically Signed: Victor M Guevara MD  12/28/2023 11:19 PM EST  Workstation ID: [id number]    CT Head Without Contrast    Result Date: 12/28/2023  CT HEAD WO CONTRAST Date of Exam: 12/28/2023 11:55 AM EST Indication: Stroke, follow up. Comparison: 12/28/2023 Technique: Axial CT images were obtained of the head without contrast administration.  Automated exposure control and iterative construction methods were used. Findings: No significant change in right thalamic acute hemorrhage measuring 3 cm in longest diameter. There is redemonstration of a small amount of intraventricular hemorrhage within the posterior horn of the right lateral ventricle. Mild right to left midline shift is again identified of approximately 2 to 3 mm. Residual contrast within the cerebral arteries. Orbits paranasal sinuses and mastoid air cells are unremarkable. Vascular calcifications are identified.     Impression: 1. No significant change in right thalamic hemorrhage measuring around 3 cm in diameter with mild right to left midline shift. Electronically Signed: Tj Tyson MD  12/28/2023 12:12 PM EST  Workstation ID: [id number]    Adult Transthoracic Echo Complete W/ Cont if Necessary Per Protocol (With Agitated Saline)    Result Date: 12/28/2023    Left ventricular systolic function is normal. Calculated left ventricular EF = 55.6%   Left ventricular diastolic function is  consistent with (grade I) impaired relaxation.   Saline test results are negative.     CT Head Without Contrast    Result Date: 12/28/2023  CT HEAD WO CONTRAST Date of Exam: 12/28/2023 5:05 AM EST Indication: Stroke, follow up. Comparison: CT scan of the head dated December 27, 2023 at 2105 hours Technique: Axial CT images were obtained of the head without contrast administration.  Automated exposure control and iterative construction methods were used. Findings: There has been very slight increase in the size of the right thalamic hemorrhage which was 3 cm and is now 3.2 cm. There is new right posterior horn lateral ventricle intraventricular hemorrhage which is very mild measuring less than 3 mm in thickness and about 7 mm in transverse diameter layering in the posterior horn. There is mild right to left midline shift of about 2 mm. Contrast is seen in the middle cerebral arteries. There are no abnormal extra-axial fluid collections. There are no new hemorrhages.     Impression: Very slight increase in the size of the right thalamic hemorrhage 3.2 cm in diameter previously 3 cm with 2 mm of right to left midline shift and a trace amount of posterior horn right lateral ventricle intraventricular hemorrhage. Electronically Signed: Christoph Richardson MD  12/28/2023 5:33 AM EST  Workstation ID: [id number]    XR Chest 1 View    Result Date: 12/27/2023  XR CHEST 1 VW Date of Exam: 12/27/2023 9:49 PM EST Indication: Acute Stroke Protocol (onset < 12 hrs) Comparison: None available. Findings: Enlarged cardiac silhouette. Ectatic thoracic aorta. No focal consolidation. No pleural effusion. No pneumothorax. No acute osseous abnormality.     Impression: No focal consolidation. Enlarged cardiac silhouette and ectatic thoracic aorta. Electronically Signed: Victor M Guevara MD  12/27/2023 10:17 PM EST  Workstation ID: [id number]    CT Angiogram Head w AI Analysis of LVO    Result Date: 12/27/2023  CT ANGIOGRAM HEAD W AI ANALYSIS OF LVO, CT  ANGIOGRAM NECK Date of Exam: 12/27/2023 9:10 PM EST Indication: Neuro deficit, acute stroke suspected Neuro deficit, acute stroke suspected. Comparison: Same day noncontrast head CT. Technique: CTA of the head was performed after the uneventful intravenous administration of 75 mL Isovue-370. Reconstructed coronal and sagittal images were also obtained. In addition, a 3-D volume rendered image was created for interpretation. Automated  exposure control and iterative reconstruction methods were used. Findings: Vascular findings: Bilateral common carotid arteries are patent. Low-grade mixed atheromatous disease of the carotid bifurcations without significant stenosis by NASCET criteria. Bilateral cervical and intracranial portions of the internal carotid arteries are patent. Bilateral MCAs are patent. Bilateral ACAs are patent. Vertebral arteries are patent. Basilar artery is patent. Bilateral PCAs are patent. No evidence of dissection or aneurysm. Nonvascular findings: Visualized lung apices are grossly clear. Glandular tissue is symmetric and normal appearing. Aerodigestive structures are within normal limits. No acute or suspicious soft tissue or osseous lesion. Right thalamic hemorrhage as discussed on noncontrast head CT.     Impression: No evidence of large vessel occlusion, flow-limiting stenosis, dissection, or aneurysm. Electronically Signed: Victor M Guevara MD  12/27/2023 9:46 PM EST  Workstation ID: [id number]    CT Angiogram Neck    Result Date: 12/27/2023  CT ANGIOGRAM HEAD W AI ANALYSIS OF LVO, CT ANGIOGRAM NECK Date of Exam: 12/27/2023 9:10 PM EST Indication: Neuro deficit, acute stroke suspected Neuro deficit, acute stroke suspected. Comparison: Same day noncontrast head CT. Technique: CTA of the head was performed after the uneventful intravenous administration of 75 mL Isovue-370. Reconstructed coronal and sagittal images were also obtained. In addition, a 3-D volume rendered image was created for  interpretation. Automated  exposure control and iterative reconstruction methods were used. Findings: Vascular findings: Bilateral common carotid arteries are patent. Low-grade mixed atheromatous disease of the carotid bifurcations without significant stenosis by NASCET criteria. Bilateral cervical and intracranial portions of the internal carotid arteries are patent. Bilateral MCAs are patent. Bilateral ACAs are patent. Vertebral arteries are patent. Basilar artery is patent. Bilateral PCAs are patent. No evidence of dissection or aneurysm. Nonvascular findings: Visualized lung apices are grossly clear. Glandular tissue is symmetric and normal appearing. Aerodigestive structures are within normal limits. No acute or suspicious soft tissue or osseous lesion. Right thalamic hemorrhage as discussed on noncontrast head CT.     Impression: No evidence of large vessel occlusion, flow-limiting stenosis, dissection, or aneurysm. Electronically Signed: Victor M Guevara MD  12/27/2023 9:46 PM EST  Workstation ID: [id number]    CT Head Without Contrast Stroke Protocol    Result Date: 12/27/2023  CT HEAD WO CONTRAST STROKE PROTOCOL Date of Exam: 12/27/2023 9:03 PM EST Indication: Neuro deficit, acute, stroke suspected Neuro deficit, acute stroke suspected. Comparison: None available. Technique: Axial CT images were obtained of the head without contrast administration.  Reconstructed coronal images were also obtained. Automated exposure control and iterative construction methods were used. Scan Time: 2105 Results discussed with the stroke coordinator via telephone by Victor M Guevara MD at 2106. Findings: 3.0 x 3.0 x 2.3 cm parenchymal hemorrhage centered in the right thalamus. Mild surrounding edema with mild regional mass effect without overt midline. No evidence of intraventricular blood products. Lacunar infarct in the left basal ganglia. No extra-axial collection. Ventricular system is nondilated. Basal cisterns are patent. No  acute or suspicious soft tissue or osseous lesion. Paranasal sinuses and mastoid air cells are well aerated.     Impression: Right thalamic hemorrhage measuring up to 3 cm. Electronically Signed: Victor M Guevara MD  12/27/2023 9:36 PM EST  Workstation ID: [id number]       During this visit the following were done:  Labs Reviewed [x]    Labs Ordered []    Radiology Reports Reviewed [x]    Radiology Ordered []    EKG, echo, and/or stress test reviewed [x]    EEG results reviewed  []    EEG reviewed and interpreted per myself   []    Discussed case with neurointerventionalist or neuroradiologist []    Referring Provider Records Reviewed []    ER Records Reviewed []    Hospital Records Reviewed []    History Obtained From Family [x]    Radiological images view and Interpreted per myself [x]    Case Discussed with referring provider []     Decision to obtain and request outside records  []        Assessment and Plan     Acute right BG ICH with mild mass effect 22 HTN, noncompliant with meds. F/U CTH stable. TME with agitation, question of alcohol abuse. He likely has DANIELLE given body habitus.   - ICU observation overnight.   - BP<140/90.   - O2 NC at night.   - Stopped 3% NS.   - CIWA.   - Thiamine.   - ST, PT, OT.              Electronically signed by Edvin Coffey MD on 12/29/2023 at 12:02 EST

## 2023-12-29 NOTE — CASE MANAGEMENT/SOCIAL WORK
Continued Stay Note  [facility]     Patient Name: Chano Rees  MRN: [id number]  Today's Date: 12/29/2023    Admit Date: 12/27/2023    Plan: TBD   Discharge Plan       Row Name 12/29/23 1106       Plan    Plan TBD    Patient/Family in Agreement with Plan yes    Plan Comments Discussed patient in MDR, patient remains on Cardene gtt.  Therapy has recommended IRF, but insurance has not been confirmed by registration.  Patient lives in [place].  CM will continue to follow and assist with discharge plan once insurance plan is confirmed.    Final Discharge Disposition Code 30 - still a patient                   Discharge Codes    No documentation.                       Dasha Adhikari RN

## 2023-12-29 NOTE — PLAN OF CARE
Goal Outcome Evaluation:  Plan of Care Reviewed With: patient        Progress: declining  Outcome Evaluation: Pt remains in ICU, MRI and CT head completed overnight. Pt became restless/anxious overnight, CIWAA scores up to 13. L sided weakness and facial droop continued, L pupil slightly smaller than R. stroke navigator notified. Hypertonic saline discontinued per Neurologists order and note. Pt requires frequent redirection and is sometimes uncooperative. Scheduled and PRN Ativan given. Cardene gtt continued for BP goal, now at 10 mg/hr.

## 2023-12-30 LAB
ALBUMIN SERPL-MCNC: 3.6 G/DL (ref 3.5–5.2)
ANION GAP SERPL CALCULATED.3IONS-SCNC: 12 MMOL/L (ref 5–15)
BASOPHILS # BLD AUTO: 0.06 10*3/MM3 (ref 0–0.2)
BASOPHILS NFR BLD AUTO: 0.4 % (ref 0–1.5)
BUN SERPL-MCNC: 25 MG/DL (ref 6–20)
BUN/CREAT SERPL: 17.9 (ref 7–25)
CALCIUM SPEC-SCNC: 8.8 MG/DL (ref 8.6–10.5)
CHLORIDE SERPL-SCNC: 106 MMOL/L (ref 98–107)
CO2 SERPL-SCNC: 21 MMOL/L (ref 22–29)
CREAT SERPL-MCNC: 1.4 MG/DL (ref 0.76–1.27)
D-LACTATE SERPL-SCNC: 1.6 MMOL/L (ref 0.5–2)
D-LACTATE SERPL-SCNC: 2.2 MMOL/L (ref 0.5–2)
DEPRECATED RDW RBC AUTO: 49 FL (ref 37–54)
EGFRCR SERPLBLD CKD-EPI 2021: 60.1 ML/MIN/1.73
EOSINOPHIL # BLD AUTO: 0.02 10*3/MM3 (ref 0–0.4)
EOSINOPHIL NFR BLD AUTO: 0.1 % (ref 0.3–6.2)
ERYTHROCYTE [DISTWIDTH] IN BLOOD BY AUTOMATED COUNT: 14.6 % (ref 12.3–15.4)
GLUCOSE BLDC GLUCOMTR-MCNC: 121 MG/DL (ref 70–130)
GLUCOSE BLDC GLUCOMTR-MCNC: 84 MG/DL (ref 70–130)
GLUCOSE BLDC GLUCOMTR-MCNC: 91 MG/DL (ref 70–130)
GLUCOSE SERPL-MCNC: 109 MG/DL (ref 65–99)
HCT VFR BLD AUTO: 44.6 % (ref 37.5–51)
HGB BLD-MCNC: 14.9 G/DL (ref 13–17.7)
IMM GRANULOCYTES # BLD AUTO: 0.15 10*3/MM3 (ref 0–0.05)
IMM GRANULOCYTES NFR BLD AUTO: 1 % (ref 0–0.5)
LYMPHOCYTES # BLD AUTO: 1.48 10*3/MM3 (ref 0.7–3.1)
LYMPHOCYTES NFR BLD AUTO: 9.9 % (ref 19.6–45.3)
MAGNESIUM SERPL-MCNC: 1.9 MG/DL (ref 1.6–2.6)
MCH RBC QN AUTO: 30.5 PG (ref 26.6–33)
MCHC RBC AUTO-ENTMCNC: 33.4 G/DL (ref 31.5–35.7)
MCV RBC AUTO: 91.4 FL (ref 79–97)
MONOCYTES # BLD AUTO: 2.27 10*3/MM3 (ref 0.1–0.9)
MONOCYTES NFR BLD AUTO: 15.2 % (ref 5–12)
NEUTROPHILS NFR BLD AUTO: 10.99 10*3/MM3 (ref 1.7–7)
NEUTROPHILS NFR BLD AUTO: 73.4 % (ref 42.7–76)
NRBC BLD AUTO-RTO: 0 /100 WBC (ref 0–0.2)
PHOSPHATE SERPL-MCNC: 4.7 MG/DL (ref 2.5–4.5)
PLATELET # BLD AUTO: 256 10*3/MM3 (ref 140–450)
PMV BLD AUTO: 10.5 FL (ref 6–12)
POTASSIUM SERPL-SCNC: 4.2 MMOL/L (ref 3.5–5.2)
RBC # BLD AUTO: 4.88 10*6/MM3 (ref 4.14–5.8)
SODIUM SERPL-SCNC: 139 MMOL/L (ref 136–145)
WBC NRBC COR # BLD AUTO: 14.97 10*3/MM3 (ref 3.4–10.8)

## 2023-12-30 PROCEDURE — 99233 SBSQ HOSP IP/OBS HIGH 50: CPT | Performed by: PSYCHIATRY & NEUROLOGY

## 2023-12-30 PROCEDURE — 80069 RENAL FUNCTION PANEL: CPT | Performed by: INTERNAL MEDICINE

## 2023-12-30 PROCEDURE — 83605 ASSAY OF LACTIC ACID: CPT | Performed by: NURSE PRACTITIONER

## 2023-12-30 PROCEDURE — 25010000002 PIPERACILLIN SOD-TAZOBACTAM PER 1 G: Performed by: NURSE PRACTITIONER

## 2023-12-30 PROCEDURE — 25010000002 THIAMINE PER 100 MG: Performed by: INTERNAL MEDICINE

## 2023-12-30 PROCEDURE — 99233 SBSQ HOSP IP/OBS HIGH 50: CPT | Performed by: INTERNAL MEDICINE

## 2023-12-30 PROCEDURE — 25810000003 SODIUM CHLORIDE 0.9 % SOLUTION 250 ML FLEX CONT: Performed by: EMERGENCY MEDICINE

## 2023-12-30 PROCEDURE — 85025 COMPLETE CBC W/AUTO DIFF WBC: CPT | Performed by: INTERNAL MEDICINE

## 2023-12-30 PROCEDURE — 82948 REAGENT STRIP/BLOOD GLUCOSE: CPT

## 2023-12-30 PROCEDURE — 25010000002 DIAZEPAM PER 5 MG: Performed by: INTERNAL MEDICINE

## 2023-12-30 PROCEDURE — 25010000002 MIDAZOLAM PER 1 MG: Performed by: INTERNAL MEDICINE

## 2023-12-30 PROCEDURE — 25810000003 SODIUM CHLORIDE 0.9 % SOLUTION: Performed by: NURSE PRACTITIONER

## 2023-12-30 PROCEDURE — 83735 ASSAY OF MAGNESIUM: CPT | Performed by: INTERNAL MEDICINE

## 2023-12-30 RX ORDER — ACETAMINOPHEN 160 MG/5ML
650 SOLUTION ORAL EVERY 4 HOURS PRN
Status: DISCONTINUED | OUTPATIENT
Start: 2023-12-30 | End: 2024-01-03

## 2023-12-30 RX ORDER — AMINO ACIDS/PROTEIN HYDROLYS 11G-40/45
30 LIQUID IN PACKET (ML) ORAL DAILY
Status: DISCONTINUED | OUTPATIENT
Start: 2023-12-30 | End: 2024-01-02

## 2023-12-30 RX ORDER — DIAZEPAM 5 MG/ML
5 INJECTION, SOLUTION INTRAMUSCULAR; INTRAVENOUS ONCE
Status: COMPLETED | OUTPATIENT
Start: 2023-12-30 | End: 2023-12-30

## 2023-12-30 RX ORDER — ACETAMINOPHEN 650 MG/1
650 SUPPOSITORY RECTAL EVERY 4 HOURS PRN
Status: DISCONTINUED | OUTPATIENT
Start: 2023-12-30 | End: 2024-01-03

## 2023-12-30 RX ADMIN — NICARDIPINE HYDROCHLORIDE 7.5 MG/HR: 25 INJECTION, SOLUTION INTRAVENOUS at 13:18

## 2023-12-30 RX ADMIN — METOPROLOL TARTRATE 50 MG: 50 TABLET ORAL at 20:47

## 2023-12-30 RX ADMIN — NICARDIPINE HYDROCHLORIDE 15 MG/HR: 25 INJECTION, SOLUTION INTRAVENOUS at 00:05

## 2023-12-30 RX ADMIN — NICARDIPINE HYDROCHLORIDE 15 MG/HR: 25 INJECTION, SOLUTION INTRAVENOUS at 08:47

## 2023-12-30 RX ADMIN — LORAZEPAM 2 MG: 1 TABLET ORAL at 06:55

## 2023-12-30 RX ADMIN — LORAZEPAM 2 MG: 1 TABLET ORAL at 18:25

## 2023-12-30 RX ADMIN — LORAZEPAM 2 MG: 1 TABLET ORAL at 08:39

## 2023-12-30 RX ADMIN — ACETAMINOPHEN 650 MG: 650 SOLUTION ORAL at 18:25

## 2023-12-30 RX ADMIN — PIPERACILLIN SODIUM AND TAZOBACTAM SODIUM 3.38 G: 3; .375 INJECTION, SOLUTION INTRAVENOUS at 05:39

## 2023-12-30 RX ADMIN — SODIUM CHLORIDE 75 ML/HR: 9 INJECTION, SOLUTION INTRAVENOUS at 18:26

## 2023-12-30 RX ADMIN — LORAZEPAM 2 MG: 1 TABLET ORAL at 22:03

## 2023-12-30 RX ADMIN — PIPERACILLIN SODIUM AND TAZOBACTAM SODIUM 3.38 G: 3; .375 INJECTION, SOLUTION INTRAVENOUS at 22:03

## 2023-12-30 RX ADMIN — NICARDIPINE HYDROCHLORIDE 7.5 MG/HR: 25 INJECTION, SOLUTION INTRAVENOUS at 06:33

## 2023-12-30 RX ADMIN — MIDAZOLAM HYDROCHLORIDE 2 MG: 1 INJECTION, SOLUTION INTRAMUSCULAR; INTRAVENOUS at 01:07

## 2023-12-30 RX ADMIN — NICARDIPINE HYDROCHLORIDE 15 MG/HR: 25 INJECTION, SOLUTION INTRAVENOUS at 23:03

## 2023-12-30 RX ADMIN — PIPERACILLIN SODIUM AND TAZOBACTAM SODIUM 3.38 G: 3; .375 INJECTION, SOLUTION INTRAVENOUS at 14:22

## 2023-12-30 RX ADMIN — NICARDIPINE HYDROCHLORIDE 15 MG/HR: 25 INJECTION, SOLUTION INTRAVENOUS at 16:21

## 2023-12-30 RX ADMIN — THIAMINE HYDROCHLORIDE 100 MG: 100 INJECTION, SOLUTION INTRAMUSCULAR; INTRAVENOUS at 08:39

## 2023-12-30 RX ADMIN — Medication 1 PATCH: at 08:40

## 2023-12-30 RX ADMIN — LORAZEPAM 1 MG: 1 TABLET ORAL at 00:08

## 2023-12-30 RX ADMIN — Medication 10 ML: at 20:39

## 2023-12-30 RX ADMIN — Medication 10 ML: at 08:40

## 2023-12-30 RX ADMIN — LABETALOL HYDROCHLORIDE 20 MG: 5 INJECTION INTRAVENOUS at 16:21

## 2023-12-30 RX ADMIN — LISINOPRIL 10 MG: 10 TABLET ORAL at 08:38

## 2023-12-30 RX ADMIN — Medication 1 TABLET: at 08:38

## 2023-12-30 RX ADMIN — LABETALOL HYDROCHLORIDE 20 MG: 5 INJECTION INTRAVENOUS at 23:46

## 2023-12-30 RX ADMIN — NICARDIPINE HYDROCHLORIDE 15 MG/HR: 25 INJECTION, SOLUTION INTRAVENOUS at 18:26

## 2023-12-30 RX ADMIN — NICARDIPINE HYDROCHLORIDE 15 MG/HR: 25 INJECTION, SOLUTION INTRAVENOUS at 01:49

## 2023-12-30 RX ADMIN — LORAZEPAM 1 MG: 1 TABLET ORAL at 15:30

## 2023-12-30 RX ADMIN — NICARDIPINE HYDROCHLORIDE 12.5 MG/HR: 25 INJECTION, SOLUTION INTRAVENOUS at 20:39

## 2023-12-30 RX ADMIN — DIAZEPAM 5 MG: 10 INJECTION, SOLUTION INTRAMUSCULAR; INTRAVENOUS at 19:50

## 2023-12-30 RX ADMIN — FOLIC ACID 1 MG: 5 INJECTION, SOLUTION INTRAMUSCULAR; INTRAVENOUS; SUBCUTANEOUS at 08:36

## 2023-12-30 RX ADMIN — METOPROLOL TARTRATE 50 MG: 50 TABLET ORAL at 08:39

## 2023-12-30 RX ADMIN — Medication 10 ML: at 06:00

## 2023-12-30 RX ADMIN — Medication 30 ML: at 14:22

## 2023-12-30 RX ADMIN — ACETAMINOPHEN 650 MG: 650 SOLUTION ORAL at 01:02

## 2023-12-30 RX ADMIN — MIDAZOLAM HYDROCHLORIDE 2 MG: 1 INJECTION, SOLUTION INTRAMUSCULAR; INTRAVENOUS at 06:00

## 2023-12-30 NOTE — PROGRESS NOTES
Neurology Note    Patient:  Chano Rees    YOB: 1970    REFERRING PHYSICIAN:  Dr. Ziegler    CHIEF COMPLAINT:    AMS, left-sided weakness    HISTORY OF PRESENT ILLNESS:   The patient spiked a temp of 102 last night, CXR with question of LLL infiltrate, started on Zosyn. Afebrile but increasingly tachyphemic today. Agitated at times, got Ativan IV this am. O2 sat low 90s on 6L NC.    Past Medical History:  Past Medical History:   Diagnosis Date    Hypertension        Past Surgical History:  Past Surgical History:   Procedure Laterality Date    ANKLE SURGERY Left        Social History:   Social History     Socioeconomic History    Marital status: Unknown   Tobacco Use    Smoking status: Former     Types: Cigarettes    Smokeless tobacco: Current     Types: Chew   Vaping Use    Vaping Use: Never used   Substance and Sexual Activity    Alcohol use: Not Currently    Drug use: Not Currently    Sexual activity: Defer        Family History:   History reviewed. No pertinent family history.    Medications Prior to Admission:    Prior to Admission medications    Not on File       Allergies:  Shellfish-derived products      Review of system  Review of Systems    Vitals:    12/30/23 1230   BP: 156/94   Pulse: 89   Resp:    Temp:    SpO2: 96%       Physical exam  Physical Exam      Lab Results   Component Value Date    WBC 14.97 (H) 12/30/2023    HGB 14.9 12/30/2023    HCT 44.6 12/30/2023    MCV 91.4 12/30/2023     12/30/2023     Lab Results   Component Value Date    GLUCOSE 109 (H) 12/30/2023    BUN 25 (H) 12/30/2023    CREATININE 1.40 (H) 12/30/2023    BCR 17.9 12/30/2023    CO2 21.0 (L) 12/30/2023    CALCIUM 8.8 12/30/2023    ALBUMIN 3.6 12/30/2023    AST 28 12/27/2023    ALT 41 12/27/2023         Radiological Studies:   XR Abdomen KUB    Result Date: 12/29/2023  XR ABDOMEN KUB Date of Exam: 12/29/2023 9:30 PM EST Indication: NG placement Comparison: None available. Findings: Weighted tip feeding  catheter tip overlies the duodenal/jejunal junction. Visualized bowel gas pattern is nonobstructive.     Impression: Weighted tip feeding catheter tip overlies the duodenal/jejunal junction. Electronically Signed: Victor M Guevara MD  12/29/2023 10:34 PM EST  Workstation ID: [id number]    XR Chest 1 View    Result Date: 12/29/2023  XR CHEST 1 VW Date of Exam: 12/29/2023 8:21 PM EST Indication: fever Comparison: 12/27/2023 Findings: Cardiothymic silhouette appears enlarged as on the prior study. There is cephalization of the vasculature. Peribronchial thickening appears increased, and there is persistent, mildly increased patchy interstitial disease in the left lung base that may represent developing bronchitis or pneumonia. No dense lung consolidation, effusion or pneumothorax is seen.     Impression: 1. Mildly increased patchy left basilar interstitial changes, potentially a developing pneumonia. Increased peribronchial thickening potentially bronchitis. 2. Cardiomegaly and mild pulmonary venous hypertension. Electronically Signed: Solomon Carbone MD  12/29/2023 8:58 PM EST  Workstation ID: [id number]    CT Head Without Contrast    Result Date: 12/29/2023  CT HEAD WO CONTRAST Date of Exam: 12/29/2023 7:12 PM EST Indication: Neuro deficit, acute, stroke suspected AMS, aphasia, f/u ICH. Comparison: Head CT same date timed 00:28. Technique: Axial CT images were obtained of the head without contrast administration.  Automated exposure control and iterative construction methods were used. Findings: Similar size and morphology of the hemorrhage centered within the right thalamus measuring around 3 cm in maximum dimension. Similar thin rim of surrounding edema. Similar 3 mm of midline shift. Similar trace blood products layering within the posterior horns of the lateral ventricles. No new or enlarging intracranial hemorrhage. No new mass effect. Paranasal sinuses and mastoid air cells are clear. No acute or suspicious soft tissue or  osseous lesion.     Impression: No significant change with similar 3 cm right thalamic hemorrhage, 3 mm midline shift, and trace intraventricular blood products. Electronically Signed: Victor M Guevara MD  12/29/2023 7:50 PM EST  Workstation ID: [id number]    CT Head Without Contrast    Result Date: 12/29/2023  CT HEAD WO CONTRAST Date of Exam: 12/29/2023 12:22 AM EST Indication: Anisocoria Drowsiness. Comparison: CT scan of the head dated December 28, 2023 at 11:56 a.m. Technique: Axial CT images were obtained of the head without contrast administration.  Automated exposure control and iterative construction methods were used. Findings: There is been no significant change in the acute right thalamic hemorrhage. Maximum diameter is about 3 cm. Small amount of intraventricular hemorrhage is seen in the posterior horn the right lateral ventricle, unchanged. The degree of right to left shift is stable at about 3 mm. Iodinated contrast is again identified in the cerebral arteries. The paranasal sinuses are clear. There are no new areas of hemorrhage or abnormal extra-axial fluid collections.     Impression: Stable CT head. Stable 3 cm right thalamic hemorrhage with right to left shift of 3 mm. Electronically Signed: Christoph Richardson MD  12/29/2023 1:12 AM EST  Workstation ID: [id number]    MRI Brain Without Contrast    Result Date: 12/28/2023  MRI BRAIN WO CONTRAST Date of Exam: 12/28/2023 10:06 PM EST Indication: Stroke, follow up.  Comparison: Same day head CT. Technique:  Routine multiplanar/multisequence sequence images of the brain were obtained without contrast administration. Please note this examination is significantly motion degraded. Findings: Within the confines of motion artifact as above, similar size of the right thalamic hemorrhage measuring around 3 cm in maximum dimension. Similar small volume layering blood products in the right lateral ventricle. Ventricular caliber is unchanged. Similar 2 to 3 mm of midline  shift. No discrete evidence of restricted diffusion otherwise to suggest acute/subacute infarction.     Impression: Please note this examination is significantly motion degraded. Within these confines, there is overall similar size/volume of the right thalamic hemorrhage and small volume intraventricular blood products in the right ventricle. Similar 2-3 mm midline shift. No gross evidence of new acute intracranial process otherwise, though majority of sequences are nondiagnostic. Electronically Signed: Victor M Guevara MD  12/28/2023 11:19 PM EST  Workstation ID: [id number]    CT Head Without Contrast    Result Date: 12/28/2023  CT HEAD WO CONTRAST Date of Exam: 12/28/2023 11:55 AM EST Indication: Stroke, follow up. Comparison: 12/28/2023 Technique: Axial CT images were obtained of the head without contrast administration.  Automated exposure control and iterative construction methods were used. Findings: No significant change in right thalamic acute hemorrhage measuring 3 cm in longest diameter. There is redemonstration of a small amount of intraventricular hemorrhage within the posterior horn of the right lateral ventricle. Mild right to left midline shift is again identified of approximately 2 to 3 mm. Residual contrast within the cerebral arteries. Orbits paranasal sinuses and mastoid air cells are unremarkable. Vascular calcifications are identified.     Impression: 1. No significant change in right thalamic hemorrhage measuring around 3 cm in diameter with mild right to left midline shift. Electronically Signed: Tj Tyson MD  12/28/2023 12:12 PM EST  Workstation ID: [id number]    Adult Transthoracic Echo Complete W/ Cont if Necessary Per Protocol (With Agitated Saline)    Result Date: 12/28/2023    Left ventricular systolic function is normal. Calculated left ventricular EF = 55.6%   Left ventricular diastolic function is consistent with (grade I) impaired relaxation.   Saline test results are negative.     CT  Head Without Contrast    Result Date: 12/28/2023  CT HEAD WO CONTRAST Date of Exam: 12/28/2023 5:05 AM EST Indication: Stroke, follow up. Comparison: CT scan of the head dated December 27, 2023 at 2105 hours Technique: Axial CT images were obtained of the head without contrast administration.  Automated exposure control and iterative construction methods were used. Findings: There has been very slight increase in the size of the right thalamic hemorrhage which was 3 cm and is now 3.2 cm. There is new right posterior horn lateral ventricle intraventricular hemorrhage which is very mild measuring less than 3 mm in thickness and about 7 mm in transverse diameter layering in the posterior horn. There is mild right to left midline shift of about 2 mm. Contrast is seen in the middle cerebral arteries. There are no abnormal extra-axial fluid collections. There are no new hemorrhages.     Impression: Very slight increase in the size of the right thalamic hemorrhage 3.2 cm in diameter previously 3 cm with 2 mm of right to left midline shift and a trace amount of posterior horn right lateral ventricle intraventricular hemorrhage. Electronically Signed: Christoph Richardson MD  12/28/2023 5:33 AM EST  Workstation ID: [id number]    XR Chest 1 View    Result Date: 12/27/2023  XR CHEST 1 VW Date of Exam: 12/27/2023 9:49 PM EST Indication: Acute Stroke Protocol (onset < 12 hrs) Comparison: None available. Findings: Enlarged cardiac silhouette. Ectatic thoracic aorta. No focal consolidation. No pleural effusion. No pneumothorax. No acute osseous abnormality.     Impression: No focal consolidation. Enlarged cardiac silhouette and ectatic thoracic aorta. Electronically Signed: Victor M Guevara MD  12/27/2023 10:17 PM EST  Workstation ID: [id number]    CT Angiogram Head w AI Analysis of LVO    Result Date: 12/27/2023  CT ANGIOGRAM HEAD W AI ANALYSIS OF LVO, CT ANGIOGRAM NECK Date of Exam: 12/27/2023 9:10 PM EST Indication: Neuro deficit, acute  stroke suspected Neuro deficit, acute stroke suspected. Comparison: Same day noncontrast head CT. Technique: CTA of the head was performed after the uneventful intravenous administration of 75 mL Isovue-370. Reconstructed coronal and sagittal images were also obtained. In addition, a 3-D volume rendered image was created for interpretation. Automated  exposure control and iterative reconstruction methods were used. Findings: Vascular findings: Bilateral common carotid arteries are patent. Low-grade mixed atheromatous disease of the carotid bifurcations without significant stenosis by NASCET criteria. Bilateral cervical and intracranial portions of the internal carotid arteries are patent. Bilateral MCAs are patent. Bilateral ACAs are patent. Vertebral arteries are patent. Basilar artery is patent. Bilateral PCAs are patent. No evidence of dissection or aneurysm. Nonvascular findings: Visualized lung apices are grossly clear. Glandular tissue is symmetric and normal appearing. Aerodigestive structures are within normal limits. No acute or suspicious soft tissue or osseous lesion. Right thalamic hemorrhage as discussed on noncontrast head CT.     Impression: No evidence of large vessel occlusion, flow-limiting stenosis, dissection, or aneurysm. Electronically Signed: Victor M Guevara MD  12/27/2023 9:46 PM EST  Workstation ID: [id number]    CT Angiogram Neck    Result Date: 12/27/2023  CT ANGIOGRAM HEAD W AI ANALYSIS OF LVO, CT ANGIOGRAM NECK Date of Exam: 12/27/2023 9:10 PM EST Indication: Neuro deficit, acute stroke suspected Neuro deficit, acute stroke suspected. Comparison: Same day noncontrast head CT. Technique: CTA of the head was performed after the uneventful intravenous administration of 75 mL Isovue-370. Reconstructed coronal and sagittal images were also obtained. In addition, a 3-D volume rendered image was created for interpretation. Automated  exposure control and iterative reconstruction methods were used.  Findings: Vascular findings: Bilateral common carotid arteries are patent. Low-grade mixed atheromatous disease of the carotid bifurcations without significant stenosis by NASCET criteria. Bilateral cervical and intracranial portions of the internal carotid arteries are patent. Bilateral MCAs are patent. Bilateral ACAs are patent. Vertebral arteries are patent. Basilar artery is patent. Bilateral PCAs are patent. No evidence of dissection or aneurysm. Nonvascular findings: Visualized lung apices are grossly clear. Glandular tissue is symmetric and normal appearing. Aerodigestive structures are within normal limits. No acute or suspicious soft tissue or osseous lesion. Right thalamic hemorrhage as discussed on noncontrast head CT.     Impression: No evidence of large vessel occlusion, flow-limiting stenosis, dissection, or aneurysm. Electronically Signed: Victor M Guevara MD  12/27/2023 9:46 PM EST  Workstation ID: [id number]    CT Head Without Contrast Stroke Protocol    Result Date: 12/27/2023  CT HEAD WO CONTRAST STROKE PROTOCOL Date of Exam: 12/27/2023 9:03 PM EST Indication: Neuro deficit, acute, stroke suspected Neuro deficit, acute stroke suspected. Comparison: None available. Technique: Axial CT images were obtained of the head without contrast administration.  Reconstructed coronal images were also obtained. Automated exposure control and iterative construction methods were used. Scan Time: 2105 Results discussed with the stroke coordinator via telephone by Victor M Guevara MD at 2106. Findings: 3.0 x 3.0 x 2.3 cm parenchymal hemorrhage centered in the right thalamus. Mild surrounding edema with mild regional mass effect without overt midline. No evidence of intraventricular blood products. Lacunar infarct in the left basal ganglia. No extra-axial collection. Ventricular system is nondilated. Basal cisterns are patent. No acute or suspicious soft tissue or osseous lesion. Paranasal sinuses and mastoid air cells are  well aerated.     Impression: Right thalamic hemorrhage measuring up to 3 cm. Electronically Signed: Victor M Guevara MD  12/27/2023 9:36 PM EST  Workstation ID: [id number]       During this visit the following were done:  Labs Reviewed [x]    Labs Ordered []    Radiology Reports Reviewed [x]    Radiology Ordered []    EKG, echo, and/or stress test reviewed [x]    EEG results reviewed  []    EEG reviewed and interpreted per myself   []    Discussed case with neurointerventionalist or neuroradiologist []    Referring Provider Records Reviewed []    ER Records Reviewed []    Hospital Records Reviewed []    History Obtained From Family [x]    Radiological images view and Interpreted per myself [x]    Case Discussed with referring provider []     Decision to obtain and request outside records  []        Assessment and Plan     Acute right BG ICH with mild mass effect 22 HTN, noncompliant with meds. F/U CTH stable. TME with agitation, question of alcohol abuse. He likely has DANIELLE given body habitus. Fever last night, likely aspiration pneumonia, now on Zosyn.   - ICU observation.   - BP<140/90.   - TF.   - Stopped 3% NS.   - CIWA.   - Thiamine.   - F/U CTH in am.   - ST, PT, OT.              Electronically signed by Edvin Coffey MD on 12/30/2023 at 12:44 EST

## 2023-12-30 NOTE — PROGRESS NOTES
Called by RN due to patient has new fever 102.7, WBC 19.  CXR showed LLL patchy airspace disease.  Zosyn was started.  Acetaminophen was ordered.  Cultures ordered.      Patient was also tachycardic and hypertensive with worsening AMS / agitation.  RN notified Neurology who wanted CTH.  Imaging was unchanged to compared to priors.  He was maxed on Cardene drip. Hypertension was resistant to additional IVPs of 40 mg IV Hydralazine and 20 mg IV Labetalol.  He does have CIWA protocol ordered.  There was question that he may still be drinking alcohol.  He was given Ativan and Versed per CIWA protocol with some improvement in hypertension and agitation.      UDS rechecked tonight that was only + benzos.    Electronically signed by AURELIANO Blanchard, 12/30/23, 6:30 AM EST.

## 2023-12-30 NOTE — PLAN OF CARE
Goal Outcome Evaluation:  Plan of Care Reviewed With: patient        Progress: declining  Outcome Evaluation: Pt remains in ICU with worsening neuro status. NIH increased to 26 at shift change, Stat CT head completed. Pt also became tachycardic, hypertensive, and tachypneic with a fever at 102.7. Chest X ray, ABG, blood cultures, urinalysis all completed, antibioticas and IV fluids started. Pneumonia suspected, pt kept NPO and NG tube placed for medication administration. After cooling blanket applied and PRN tylenol given pt no longer febrile. Nicardipine gtt weaned off at 0400. Pt continues to be restless at times, but lethargic/drowsy at times. Pt not following commands, does not open eyes, does not verbalize, and moves R extremities spontaneously, RUE wrist restraint in place. LUE and LLE withdraw to pain only. Family at bedside and updated on change in status, questions answered.

## 2023-12-30 NOTE — PROGRESS NOTES
"INTENSIVIST NOTE     Hospital Day: 3    Mr. Chano Rees, 53 y.o. male is followed for:   ICH       SUBJECTIVE     Interval history:    Febrile up to 102.7 last night.  Cultures and antibiotics initiated.  Fluid balance +3.4 L.  Neurologic status continues to wax and wane.    The patient's relevant past medical, surgical and social history were reviewed and updated in Epic as appropriate.       OBJECTIVE     Vital Sign Min/Max for last 24 hours  Temp  Min: 97.9 °F (36.6 °C)  Max: 102.7 °F (39.3 °C)   BP  Min: 98/64  Max: 164/90   Pulse  Min: 75  Max: 128   Resp  Min: 25  Max: 38   SpO2  Min: 85 %  Max: 96 %   No data recorded   Weight  Min: 127 kg (280 lb 3.3 oz)  Max: 127 kg (280 lb 3.3 oz)      Intake/Output Summary (Last 24 hours) at 12/30/2023 1418  Last data filed at 12/30/2023 1400  Gross per 24 hour   Intake 4819.7 ml   Output 1400 ml   Net 3419.7 ml      Flowsheet Rows      Flowsheet Row First Filed Value   Admission Height 177.8 cm (70\") Documented at 12/27/2023 2152   Admission Weight 125 kg (275 lb) Documented at 12/27/2023 2152               12/27/23  2152 12/28/23  0101 12/30/23  0400   Weight: 125 kg (275 lb) 126 kg (278 lb 7.1 oz) 127 kg (280 lb 3.3 oz)            Objective:  General Appearance:  In no acute distress.    Vital signs: (most recent): Blood pressure 137/85, pulse 103, temperature 98.5 °F (36.9 °C), temperature source Axillary, resp. rate 28, height 180.3 cm (71\"), weight 127 kg (280 lb 3.3 oz), SpO2 93%.    HEENT: Normal HEENT exam.    Lungs:  Normal effort and normal respiratory rate.  Breath sounds clear to auscultation.  He is not in respiratory distress.  No rales, wheezes or rhonchi.    Heart: Normal rate.  Regular rhythm.  S1 normal and S2 normal.  No murmur, gallop or friction rub.   Chest: Symmetric chest wall expansion.   Abdomen: Abdomen is soft and non-distended.  Bowel sounds are normal.   There is no abdominal tenderness.   There is no mass. There is no splenomegaly. " There is no hepatomegaly.   Extremities: There is no deformity or dependent edema.    Pupils:  Pupils are equal, round, and reactive to light.    Skin:  Warm and dry.            Interval:  (handoff)  1a. Level of Consciousness: 1-->Not alert, but arousable by minor stimulation to obey, answer, or respond  1b. LOC Questions: 1-->Answers one question correctly  1c. LOC Commands: 0-->Performs both tasks correctly  2. Best Gaze: 0-->Normal  3. Visual: 0-->No visual loss  4. Facial Palsy: 1-->Minor paralysis (flattened nasolabial fold, asymmetry on smiling)  5a. Motor Arm, Left: 3-->No effort against gravity, limb falls  5b. Motor Arm, Right: 1-->Drift, limb holds 90 (or 45) degrees, but drifts down before full 10 secs, does not hit bed or other support  6a. Motor Leg, Left: 2-->Some effort against gravity, leg falls to bed by 5 secs, but has some effort against gravity  6b. Motor Leg, Right: 1-->Drift, leg falls by the end of the 5-sec period but does not hit bed  7. Limb Ataxia: 1-->Present in one limb  8. Sensory: 1-->Mild-to-moderate sensory loss, patient feels pinprick is less sharp or is dull on the affected side, or there is a loss of superficial pain with pinprick, but patient is aware of being touched  9. Best Language: 1-->Mild-to-moderate aphasia, some obvious loss of fluency or facility of comprehension, without significant limitation on ideas expressed or form of expression. Reduction of speech and/or comprehension, however, makes conversation. . . (see row details)  10. Dysarthria: 1-->Mild-to-moderate dysarthria, patient slurs at least some words and, at worst, can be understood with some difficulty  11. Extinction and Inattention (formerly Neglect): 0-->No abnormality    Total (NIH Stroke Scale): 14     I reviewed the patient's new clinical results.  I reviewed the patient's new imaging results/reports including actual images and agree with reports.    XR Abdomen KUB    Result Date:  12/29/2023  Impression: Weighted tip feeding catheter tip overlies the duodenal/jejunal junction. Electronically Signed: Victor M Guevara MD  12/29/2023 10:34 PM EST  Workstation ID: [id number]    XR Chest 1 View    Result Date: 12/29/2023  Impression: 1. Mildly increased patchy left basilar interstitial changes, potentially a developing pneumonia. Increased peribronchial thickening potentially bronchitis. 2. Cardiomegaly and mild pulmonary venous hypertension. Electronically Signed: Solomon Carbone MD  12/29/2023 8:58 PM EST  Workstation ID: [id number]    CT Head Without Contrast    Result Date: 12/29/2023  Impression: No significant change with similar 3 cm right thalamic hemorrhage, 3 mm midline shift, and trace intraventricular blood products. Electronically Signed: Victor M Guevara MD  12/29/2023 7:50 PM EST  Workstation ID: [id number]    CT Head Without Contrast    Result Date: 12/29/2023  Impression: Stable CT head. Stable 3 cm right thalamic hemorrhage with right to left shift of 3 mm. Electronically Signed: Christoph Richardson MD  12/29/2023 1:12 AM EST  Workstation ID: [id number]    MRI Brain Without Contrast    Result Date: 12/28/2023  Impression: Please note this examination is significantly motion degraded. Within these confines, there is overall similar size/volume of the right thalamic hemorrhage and small volume intraventricular blood products in the right ventricle. Similar 2-3 mm midline shift. No gross evidence of new acute intracranial process otherwise, though majority of sequences are nondiagnostic. Electronically Signed: Victor M Guevara MD  12/28/2023 11:19 PM EST  Workstation ID: [id number]      INFUSIONS  niCARdipine, 5-15 mg/hr, Last Rate: 7.5 mg/hr (12/30/23 1318)  sodium chloride, 75 mL/hr, Last Rate: 75 mL/hr (12/29/23 2319)        Results from last 7 days   Lab Units 12/30/23  0354 12/29/23  0430 12/27/23  2358   WBC 10*3/mm3 14.97* 19.30* 11.19*   HEMOGLOBIN g/dL 14.9 15.3 17.1   HEMATOCRIT % 44.6 47.1 50.0    PLATELETS 10*3/mm3 256 264 272     Results from last 7 days   Lab Units 12/30/23  0457 12/29/23  0430 12/28/23  1804 12/28/23  0612 12/27/23  2358   SODIUM mmol/L 139 139 142  142   < > 139  139   POTASSIUM mmol/L 4.2 4.6 4.3   < > 4.0  4.0   CHLORIDE mmol/L 106 106 107   < > 104  104   CO2 mmol/L 21.0* 23.0 18.0*   < > 22.0  22.0   BUN mg/dL 25* 20 21*   < > 12  12   GLUCOSE mg/dL 109* 117* 163*   < > 133*  133*   CREATININE mg/dL 1.40* 1.30* 1.53*   < > 1.28*  1.28*   MAGNESIUM mg/dL 1.9  --   --   --  2.3   CALCIUM mg/dL 8.8 9.4 9.6   < > 9.8  9.8   ALBUMIN g/dL 3.6  --   --   --  4.3    < > = values in this interval not displayed.         Results from last 7 days   Lab Units 12/29/23 2042   PH, ARTERIAL pH units 7.545*   PCO2, ARTERIAL mm Hg 25.3*   PO2 ART mm Hg 59.3*   FIO2 % 28       Diet, Tube Feeding Tube Feeding Formula: Peptamen Intense VHP (Peptide Based, Very High Protein)   /h  Patient doesn't have any tube feeding modular orders    Mechanical Ventilator:   Settings: Observed:                                                I reviewed the patient's medications.    Assessment & Plan   ASSESSMENT/PLAN     Active Hospital Problems    Diagnosis     **Rt Thalamic ICH     Hypertension     Non-compliance     History of alcohol abuse        53-year-old male with a past medical history significant for hypertension and poor medical compliance.  He presented to the ER on 12/27 with sudden onset of left-sided weakness and headache.  Blood pressure in /139 initially.  CT of the head revealed a 3 cm right thalamic ICH.  CTA was unremarkable and was felt this was a hypertensive bleed.  He was admitted to the ICU for blood pressure control.  He has a history of alcoholism but reportedly has not been drinking recently    Since arrival in the ICU his neurologic status has tended to wax and wane.  His family has insisted that he has not drank any alcohol recently.  He is getting as needed benzodiazepines  but he is not on scheduled benzodiazepines for this reason.  Trying to minimize sedation.  He developed a fever last night and there was a question of a possible left lower lobe infiltrate but this is subtle.  Cultures were obtained and Zosyn was initiated.  A feeding tube was placed.    Plan:    Goal SBP less than 140 mmHg  Cardene drip as needed  Oral Lopressor and lisinopril  Nicotine patch  Thiamine and folate  As needed benzodiazepines per CIWA scale  Zosyn empirically  Monitor fever curve.  This could certainly be central as well as infectious related  Initiate tube feeds     I discussed the patient's findings and my recommendations with patient, family, and nursing staff     Plan of care and goals reviewed with multidisciplinary team at daily rounds.    High level of risk due to:  Acute illness with threat to life or bodily function.    Edvin Child MD  Pulmonary and Critical Care Medicine  12/30/23 14:18 EST

## 2023-12-31 ENCOUNTER — APPOINTMENT (OUTPATIENT)
Dept: GENERAL RADIOLOGY | Facility: HOSPITAL | Age: 53
End: 2023-12-31
Payer: COMMERCIAL

## 2023-12-31 ENCOUNTER — APPOINTMENT (OUTPATIENT)
Dept: CT IMAGING | Facility: HOSPITAL | Age: 53
End: 2023-12-31
Payer: COMMERCIAL

## 2023-12-31 LAB
ANION GAP SERPL CALCULATED.3IONS-SCNC: 13 MMOL/L (ref 5–15)
BASOPHILS # BLD AUTO: 0.05 10*3/MM3 (ref 0–0.2)
BASOPHILS NFR BLD AUTO: 0.3 % (ref 0–1.5)
BUN SERPL-MCNC: 27 MG/DL (ref 6–20)
BUN/CREAT SERPL: 23.5 (ref 7–25)
CALCIUM SPEC-SCNC: 8.7 MG/DL (ref 8.6–10.5)
CHLORIDE SERPL-SCNC: 106 MMOL/L (ref 98–107)
CO2 SERPL-SCNC: 19 MMOL/L (ref 22–29)
CREAT SERPL-MCNC: 1.15 MG/DL (ref 0.76–1.27)
DEPRECATED RDW RBC AUTO: 49.2 FL (ref 37–54)
EGFRCR SERPLBLD CKD-EPI 2021: 76.1 ML/MIN/1.73
EOSINOPHIL # BLD AUTO: 0.1 10*3/MM3 (ref 0–0.4)
EOSINOPHIL NFR BLD AUTO: 0.6 % (ref 0.3–6.2)
ERYTHROCYTE [DISTWIDTH] IN BLOOD BY AUTOMATED COUNT: 14.6 % (ref 12.3–15.4)
GLUCOSE BLDC GLUCOMTR-MCNC: 110 MG/DL (ref 70–130)
GLUCOSE BLDC GLUCOMTR-MCNC: 119 MG/DL (ref 70–130)
GLUCOSE BLDC GLUCOMTR-MCNC: 86 MG/DL (ref 70–130)
GLUCOSE BLDC GLUCOMTR-MCNC: 90 MG/DL (ref 70–130)
GLUCOSE BLDC GLUCOMTR-MCNC: 95 MG/DL (ref 70–130)
GLUCOSE SERPL-MCNC: 98 MG/DL (ref 65–99)
HCT VFR BLD AUTO: 45.6 % (ref 37.5–51)
HGB BLD-MCNC: 14.7 G/DL (ref 13–17.7)
IMM GRANULOCYTES # BLD AUTO: 0.17 10*3/MM3 (ref 0–0.05)
IMM GRANULOCYTES NFR BLD AUTO: 1 % (ref 0–0.5)
LYMPHOCYTES # BLD AUTO: 1.65 10*3/MM3 (ref 0.7–3.1)
LYMPHOCYTES NFR BLD AUTO: 9.6 % (ref 19.6–45.3)
MCH RBC QN AUTO: 29.7 PG (ref 26.6–33)
MCHC RBC AUTO-ENTMCNC: 32.2 G/DL (ref 31.5–35.7)
MCV RBC AUTO: 92.1 FL (ref 79–97)
MONOCYTES # BLD AUTO: 2.67 10*3/MM3 (ref 0.1–0.9)
MONOCYTES NFR BLD AUTO: 15.6 % (ref 5–12)
NEUTROPHILS NFR BLD AUTO: 12.46 10*3/MM3 (ref 1.7–7)
NEUTROPHILS NFR BLD AUTO: 72.9 % (ref 42.7–76)
NRBC BLD AUTO-RTO: 0 /100 WBC (ref 0–0.2)
PLATELET # BLD AUTO: 225 10*3/MM3 (ref 140–450)
PMV BLD AUTO: 10.8 FL (ref 6–12)
POTASSIUM SERPL-SCNC: 4.2 MMOL/L (ref 3.5–5.2)
RBC # BLD AUTO: 4.95 10*6/MM3 (ref 4.14–5.8)
SODIUM SERPL-SCNC: 138 MMOL/L (ref 136–145)
WBC NRBC COR # BLD AUTO: 17.1 10*3/MM3 (ref 3.4–10.8)

## 2023-12-31 PROCEDURE — 99233 SBSQ HOSP IP/OBS HIGH 50: CPT | Performed by: INTERNAL MEDICINE

## 2023-12-31 PROCEDURE — 94761 N-INVAS EAR/PLS OXIMETRY MLT: CPT

## 2023-12-31 PROCEDURE — 25810000003 SODIUM CHLORIDE 0.9 % SOLUTION 250 ML FLEX CONT: Performed by: EMERGENCY MEDICINE

## 2023-12-31 PROCEDURE — 82948 REAGENT STRIP/BLOOD GLUCOSE: CPT

## 2023-12-31 PROCEDURE — 99233 SBSQ HOSP IP/OBS HIGH 50: CPT | Performed by: PSYCHIATRY & NEUROLOGY

## 2023-12-31 PROCEDURE — 80048 BASIC METABOLIC PNL TOTAL CA: CPT | Performed by: INTERNAL MEDICINE

## 2023-12-31 PROCEDURE — 85025 COMPLETE CBC W/AUTO DIFF WBC: CPT | Performed by: INTERNAL MEDICINE

## 2023-12-31 PROCEDURE — 25010000002 PIPERACILLIN SOD-TAZOBACTAM PER 1 G: Performed by: NURSE PRACTITIONER

## 2023-12-31 PROCEDURE — 70450 CT HEAD/BRAIN W/O DYE: CPT

## 2023-12-31 PROCEDURE — 94799 UNLISTED PULMONARY SVC/PX: CPT

## 2023-12-31 PROCEDURE — 25010000002 MIDAZOLAM PER 1 MG: Performed by: INTERNAL MEDICINE

## 2023-12-31 PROCEDURE — 25010000002 FUROSEMIDE PER 20 MG: Performed by: INTERNAL MEDICINE

## 2023-12-31 PROCEDURE — 71045 X-RAY EXAM CHEST 1 VIEW: CPT

## 2023-12-31 PROCEDURE — 94660 CPAP INITIATION&MGMT: CPT

## 2023-12-31 PROCEDURE — 25810000003 SODIUM CHLORIDE 0.9 % SOLUTION: Performed by: NURSE PRACTITIONER

## 2023-12-31 RX ORDER — LISINOPRIL 10 MG/1
10 TABLET ORAL ONCE
Status: COMPLETED | OUTPATIENT
Start: 2023-12-31 | End: 2023-12-31

## 2023-12-31 RX ORDER — LISINOPRIL 20 MG/1
20 TABLET ORAL
Status: DISCONTINUED | OUTPATIENT
Start: 2024-01-01 | End: 2024-01-03

## 2023-12-31 RX ORDER — FUROSEMIDE 10 MG/ML
40 INJECTION INTRAMUSCULAR; INTRAVENOUS ONCE
Status: COMPLETED | OUTPATIENT
Start: 2023-12-31 | End: 2023-12-31

## 2023-12-31 RX ADMIN — Medication 10 ML: at 20:25

## 2023-12-31 RX ADMIN — NICARDIPINE HYDROCHLORIDE 5 MG/HR: 25 INJECTION, SOLUTION INTRAVENOUS at 02:08

## 2023-12-31 RX ADMIN — Medication 30 ML: at 08:09

## 2023-12-31 RX ADMIN — NICARDIPINE HYDROCHLORIDE 15 MG/HR: 25 INJECTION, SOLUTION INTRAVENOUS at 09:39

## 2023-12-31 RX ADMIN — Medication 10 ML: at 08:35

## 2023-12-31 RX ADMIN — MIDAZOLAM HYDROCHLORIDE 2 MG: 1 INJECTION, SOLUTION INTRAMUSCULAR; INTRAVENOUS at 00:07

## 2023-12-31 RX ADMIN — Medication 1 PATCH: at 08:05

## 2023-12-31 RX ADMIN — LISINOPRIL 10 MG: 10 TABLET ORAL at 15:13

## 2023-12-31 RX ADMIN — PIPERACILLIN SODIUM AND TAZOBACTAM SODIUM 3.38 G: 3; .375 INJECTION, SOLUTION INTRAVENOUS at 14:04

## 2023-12-31 RX ADMIN — FUROSEMIDE 40 MG: 10 INJECTION, SOLUTION INTRAMUSCULAR; INTRAVENOUS at 15:13

## 2023-12-31 RX ADMIN — NICARDIPINE HYDROCHLORIDE 15 MG/HR: 25 INJECTION, SOLUTION INTRAVENOUS at 11:31

## 2023-12-31 RX ADMIN — NICARDIPINE HYDROCHLORIDE 8 MG/HR: 25 INJECTION, SOLUTION INTRAVENOUS at 19:57

## 2023-12-31 RX ADMIN — NICARDIPINE HYDROCHLORIDE 8 MG/HR: 25 INJECTION, SOLUTION INTRAVENOUS at 23:35

## 2023-12-31 RX ADMIN — NICARDIPINE HYDROCHLORIDE 10 MG/HR: 25 INJECTION, SOLUTION INTRAVENOUS at 16:27

## 2023-12-31 RX ADMIN — ACETAMINOPHEN 650 MG: 650 SOLUTION ORAL at 14:04

## 2023-12-31 RX ADMIN — LORAZEPAM 2 MG: 1 TABLET ORAL at 19:28

## 2023-12-31 RX ADMIN — PIPERACILLIN SODIUM AND TAZOBACTAM SODIUM 3.38 G: 3; .375 INJECTION, SOLUTION INTRAVENOUS at 21:31

## 2023-12-31 RX ADMIN — LORAZEPAM 2 MG: 1 TABLET ORAL at 12:31

## 2023-12-31 RX ADMIN — SODIUM CHLORIDE 75 ML/HR: 9 INJECTION, SOLUTION INTRAVENOUS at 08:31

## 2023-12-31 RX ADMIN — ACETAMINOPHEN 650 MG: 650 SOLUTION ORAL at 00:07

## 2023-12-31 RX ADMIN — METOPROLOL TARTRATE 50 MG: 50 TABLET ORAL at 20:19

## 2023-12-31 RX ADMIN — LISINOPRIL 10 MG: 10 TABLET ORAL at 08:06

## 2023-12-31 RX ADMIN — PIPERACILLIN SODIUM AND TAZOBACTAM SODIUM 3.38 G: 3; .375 INJECTION, SOLUTION INTRAVENOUS at 05:13

## 2023-12-31 RX ADMIN — LORAZEPAM 2 MG: 1 TABLET ORAL at 23:38

## 2023-12-31 RX ADMIN — METOPROLOL TARTRATE 50 MG: 50 TABLET ORAL at 08:06

## 2023-12-31 RX ADMIN — LORAZEPAM 2 MG: 1 TABLET ORAL at 22:02

## 2023-12-31 RX ADMIN — NICARDIPINE HYDROCHLORIDE 7.5 MG/HR: 25 INJECTION, SOLUTION INTRAVENOUS at 06:22

## 2023-12-31 RX ADMIN — NICARDIPINE HYDROCHLORIDE 15 MG/HR: 25 INJECTION, SOLUTION INTRAVENOUS at 13:36

## 2023-12-31 RX ADMIN — MIDAZOLAM HYDROCHLORIDE 2 MG: 1 INJECTION, SOLUTION INTRAMUSCULAR; INTRAVENOUS at 03:47

## 2023-12-31 RX ADMIN — Medication 1 TABLET: at 08:06

## 2023-12-31 NOTE — PROGRESS NOTES
Neurology Note    Patient:  Chano Rees    YOB: 1970    REFERRING PHYSICIAN:  Dr. Ziegler    CHIEF COMPLAINT:    AMS    HISTORY OF PRESENT ILLNESS:   The patient remains restless and tachypneic, got Ativan this am, no seizures, NIHSS 12, remains on Cardene. Tm 99.5.    Past Medical History:  Past Medical History:   Diagnosis Date    Hypertension        Past Surgical History:  Past Surgical History:   Procedure Laterality Date    ANKLE SURGERY Left        Social History:   Social History     Socioeconomic History    Marital status: Single   Tobacco Use    Smoking status: Former     Types: Cigarettes    Smokeless tobacco: Current     Types: Chew   Vaping Use    Vaping Use: Never used   Substance and Sexual Activity    Alcohol use: Not Currently    Drug use: Not Currently    Sexual activity: Defer        Family History:   History reviewed. No pertinent family history.    Medications Prior to Admission:    Prior to Admission medications    Not on File       Allergies:  Shellfish-derived products      Review of system  Review of Systems   Unable to perform ROS: Mental status change       Vitals:    12/31/23 1200   BP: 136/81   Pulse: 108   Resp: (!) 33   Temp: 99.6 °F (37.6 °C)   SpO2: 90%       Physical exam  Physical Exam  Eyes:      Pupils: Pupils are equal, round, and reactive to light.   Cardiovascular:      Rate and Rhythm: Normal rate and regular rhythm.   Pulmonary:      Effort: Tachypnea and accessory muscle usage present.   Neurological:      Deep Tendon Reflexes: Babinski sign absent on the right side. Babinski sign present on the left side.      Comments: Drowsy, opens eyes to voice, does not speak, left facial droop, moves right side well, left side minimally.           Lab Results   Component Value Date    WBC 17.10 (H) 12/31/2023    HGB 14.7 12/31/2023    HCT 45.6 12/31/2023    MCV 92.1 12/31/2023     12/31/2023     Lab Results   Component Value Date    GLUCOSE 98 12/31/2023     BUN 27 (H) 12/31/2023    CREATININE 1.15 12/31/2023    BCR 23.5 12/31/2023    CO2 19.0 (L) 12/31/2023    CALCIUM 8.7 12/31/2023    ALBUMIN 3.6 12/30/2023    AST 28 12/27/2023    ALT 41 12/27/2023         Radiological Studies:   XR Chest 1 View    Result Date: 12/31/2023  XR CHEST 1 VW Date of Exam: 12/31/2023 3:10 AM EST Indication: Hypoxia Comparison: Chest x-ray 12/29/2023 Findings: Redemonstration of right upper extremity PICC line. Interval placement of enteric feeding tube which courses in the stomach with the tip beyond the field-of-view. Stable cardiomegaly. The left costophrenic angle is included from the field-of-view. There are vague interstitial opacities bilaterally similar to prior. No definite pleural effusion. No pneumothorax.     Impression: Placement of enteric feeding tube which courses into the stomach with the tip beyond the field-of-view. Vague bilateral interstitial opacities suspicious for atypical/viral infection. Electronically Signed: Alex Nieto MD  12/31/2023 8:05 AM EST  Workstation ID: [id number]    CT Head Without Contrast    Result Date: 12/31/2023  CT HEAD WO CONTRAST Date of Exam: 12/31/2023 5:35 AM EST Indication: Stroke, follow up. Comparison: 12/29/2023 Technique: Axial CT images were obtained of the head without contrast administration.  Automated exposure control and iterative construction methods were used. Findings: There is a stable intraparenchymal hemorrhage in the right basal ganglia. This measures up to 3.2 x 2.1 x 4.0 cm (approximately 13 cc) and extends into the right lateral ventricle. There is trace intraventricular hemorrhage, which is slightly decreased compared to the prior study. There is no new hemorrhage. There is edema surrounding the right basal ganglia hemorrhage with mild mass effect and 3 mm leftward shift of midline. There is no evidence of herniation or acute hydrocephalus. There is mild stable underlying chronic small vessel ischemic change. No new  hypoattenuating lesions in the brain. There are mild intracranial vascular calcifications. There is mild right maxillary sinus mucosal disease. The mastoids are clear. The calvarium is intact. Orbits are unremarkable. Superficial soft tissues appear within normal limits.     Impression: 1.Stable right basal ganglia intraparenchymal hemorrhage extending into the right lateral ventricle. 2.Stable mild mass effect with 3 mm leftward shift of midline. No herniation or acute hydrocephalus. 3.No new hemorrhage. 4.Mild chronic small vessel ischemic change. Electronically Signed: Tal Meier MD  12/31/2023 6:00 AM EST  Workstation ID: [id number]    XR Abdomen KUB    Result Date: 12/29/2023  XR ABDOMEN KUB Date of Exam: 12/29/2023 9:30 PM EST Indication: NG placement Comparison: None available. Findings: Weighted tip feeding catheter tip overlies the duodenal/jejunal junction. Visualized bowel gas pattern is nonobstructive.     Impression: Weighted tip feeding catheter tip overlies the duodenal/jejunal junction. Electronically Signed: Victor M Guevara MD  12/29/2023 10:34 PM EST  Workstation ID: [id number]    XR Chest 1 View    Result Date: 12/29/2023  XR CHEST 1 VW Date of Exam: 12/29/2023 8:21 PM EST Indication: fever Comparison: 12/27/2023 Findings: Cardiothymic silhouette appears enlarged as on the prior study. There is cephalization of the vasculature. Peribronchial thickening appears increased, and there is persistent, mildly increased patchy interstitial disease in the left lung base that may represent developing bronchitis or pneumonia. No dense lung consolidation, effusion or pneumothorax is seen.     Impression: 1. Mildly increased patchy left basilar interstitial changes, potentially a developing pneumonia. Increased peribronchial thickening potentially bronchitis. 2. Cardiomegaly and mild pulmonary venous hypertension. Electronically Signed: Solomon Carbone MD  12/29/2023 8:58 PM EST  Workstation ID: [id number]    CT Head  Without Contrast    Result Date: 12/29/2023  CT HEAD WO CONTRAST Date of Exam: 12/29/2023 7:12 PM EST Indication: Neuro deficit, acute, stroke suspected AMS, aphasia, f/u ICH. Comparison: Head CT same date timed 00:28. Technique: Axial CT images were obtained of the head without contrast administration.  Automated exposure control and iterative construction methods were used. Findings: Similar size and morphology of the hemorrhage centered within the right thalamus measuring around 3 cm in maximum dimension. Similar thin rim of surrounding edema. Similar 3 mm of midline shift. Similar trace blood products layering within the posterior horns of the lateral ventricles. No new or enlarging intracranial hemorrhage. No new mass effect. Paranasal sinuses and mastoid air cells are clear. No acute or suspicious soft tissue or osseous lesion.     Impression: No significant change with similar 3 cm right thalamic hemorrhage, 3 mm midline shift, and trace intraventricular blood products. Electronically Signed: Victor M Guevara MD  12/29/2023 7:50 PM EST  Workstation ID: [id number]    CT Head Without Contrast    Result Date: 12/29/2023  CT HEAD WO CONTRAST Date of Exam: 12/29/2023 12:22 AM EST Indication: Anisocoria Drowsiness. Comparison: CT scan of the head dated December 28, 2023 at 11:56 a.m. Technique: Axial CT images were obtained of the head without contrast administration.  Automated exposure control and iterative construction methods were used. Findings: There is been no significant change in the acute right thalamic hemorrhage. Maximum diameter is about 3 cm. Small amount of intraventricular hemorrhage is seen in the posterior horn the right lateral ventricle, unchanged. The degree of right to left shift is stable at about 3 mm. Iodinated contrast is again identified in the cerebral arteries. The paranasal sinuses are clear. There are no new areas of hemorrhage or abnormal extra-axial fluid collections.     Impression:  Stable CT head. Stable 3 cm right thalamic hemorrhage with right to left shift of 3 mm. Electronically Signed: Christoph Richardson MD  12/29/2023 1:12 AM EST  Workstation ID: [id number]    MRI Brain Without Contrast    Result Date: 12/28/2023  MRI BRAIN WO CONTRAST Date of Exam: 12/28/2023 10:06 PM EST Indication: Stroke, follow up.  Comparison: Same day head CT. Technique:  Routine multiplanar/multisequence sequence images of the brain were obtained without contrast administration. Please note this examination is significantly motion degraded. Findings: Within the confines of motion artifact as above, similar size of the right thalamic hemorrhage measuring around 3 cm in maximum dimension. Similar small volume layering blood products in the right lateral ventricle. Ventricular caliber is unchanged. Similar 2 to 3 mm of midline shift. No discrete evidence of restricted diffusion otherwise to suggest acute/subacute infarction.     Impression: Please note this examination is significantly motion degraded. Within these confines, there is overall similar size/volume of the right thalamic hemorrhage and small volume intraventricular blood products in the right ventricle. Similar 2-3 mm midline shift. No gross evidence of new acute intracranial process otherwise, though majority of sequences are nondiagnostic. Electronically Signed: Victor M Guevara MD  12/28/2023 11:19 PM EST  Workstation ID: [id number]    CT Head Without Contrast    Result Date: 12/28/2023  CT HEAD WO CONTRAST Date of Exam: 12/28/2023 11:55 AM EST Indication: Stroke, follow up. Comparison: 12/28/2023 Technique: Axial CT images were obtained of the head without contrast administration.  Automated exposure control and iterative construction methods were used. Findings: No significant change in right thalamic acute hemorrhage measuring 3 cm in longest diameter. There is redemonstration of a small amount of intraventricular hemorrhage within the posterior horn of the  right lateral ventricle. Mild right to left midline shift is again identified of approximately 2 to 3 mm. Residual contrast within the cerebral arteries. Orbits paranasal sinuses and mastoid air cells are unremarkable. Vascular calcifications are identified.     Impression: 1. No significant change in right thalamic hemorrhage measuring around 3 cm in diameter with mild right to left midline shift. Electronically Signed: Tj Tyson MD  12/28/2023 12:12 PM EST  Workstation ID: [id number]    Adult Transthoracic Echo Complete W/ Cont if Necessary Per Protocol (With Agitated Saline)    Result Date: 12/28/2023    Left ventricular systolic function is normal. Calculated left ventricular EF = 55.6%   Left ventricular diastolic function is consistent with (grade I) impaired relaxation.   Saline test results are negative.     CT Head Without Contrast    Result Date: 12/28/2023  CT HEAD WO CONTRAST Date of Exam: 12/28/2023 5:05 AM EST Indication: Stroke, follow up. Comparison: CT scan of the head dated December 27, 2023 at 2105 hours Technique: Axial CT images were obtained of the head without contrast administration.  Automated exposure control and iterative construction methods were used. Findings: There has been very slight increase in the size of the right thalamic hemorrhage which was 3 cm and is now 3.2 cm. There is new right posterior horn lateral ventricle intraventricular hemorrhage which is very mild measuring less than 3 mm in thickness and about 7 mm in transverse diameter layering in the posterior horn. There is mild right to left midline shift of about 2 mm. Contrast is seen in the middle cerebral arteries. There are no abnormal extra-axial fluid collections. There are no new hemorrhages.     Impression: Very slight increase in the size of the right thalamic hemorrhage 3.2 cm in diameter previously 3 cm with 2 mm of right to left midline shift and a trace amount of posterior horn right lateral ventricle  intraventricular hemorrhage. Electronically Signed: Christoph Richardson MD  12/28/2023 5:33 AM EST  Workstation ID: [id number]    XR Chest 1 View    Result Date: 12/27/2023  XR CHEST 1 VW Date of Exam: 12/27/2023 9:49 PM EST Indication: Acute Stroke Protocol (onset < 12 hrs) Comparison: None available. Findings: Enlarged cardiac silhouette. Ectatic thoracic aorta. No focal consolidation. No pleural effusion. No pneumothorax. No acute osseous abnormality.     Impression: No focal consolidation. Enlarged cardiac silhouette and ectatic thoracic aorta. Electronically Signed: Victor M Guevara MD  12/27/2023 10:17 PM EST  Workstation ID: [id number]    CT Angiogram Head w AI Analysis of LVO    Result Date: 12/27/2023  CT ANGIOGRAM HEAD W AI ANALYSIS OF LVO, CT ANGIOGRAM NECK Date of Exam: 12/27/2023 9:10 PM EST Indication: Neuro deficit, acute stroke suspected Neuro deficit, acute stroke suspected. Comparison: Same day noncontrast head CT. Technique: CTA of the head was performed after the uneventful intravenous administration of 75 mL Isovue-370. Reconstructed coronal and sagittal images were also obtained. In addition, a 3-D volume rendered image was created for interpretation. Automated  exposure control and iterative reconstruction methods were used. Findings: Vascular findings: Bilateral common carotid arteries are patent. Low-grade mixed atheromatous disease of the carotid bifurcations without significant stenosis by NASCET criteria. Bilateral cervical and intracranial portions of the internal carotid arteries are patent. Bilateral MCAs are patent. Bilateral ACAs are patent. Vertebral arteries are patent. Basilar artery is patent. Bilateral PCAs are patent. No evidence of dissection or aneurysm. Nonvascular findings: Visualized lung apices are grossly clear. Glandular tissue is symmetric and normal appearing. Aerodigestive structures are within normal limits. No acute or suspicious soft tissue or osseous lesion. Right thalamic  hemorrhage as discussed on noncontrast head CT.     Impression: No evidence of large vessel occlusion, flow-limiting stenosis, dissection, or aneurysm. Electronically Signed: Victor M Guevara MD  12/27/2023 9:46 PM EST  Workstation ID: [id number]    CT Angiogram Neck    Result Date: 12/27/2023  CT ANGIOGRAM HEAD W AI ANALYSIS OF LVO, CT ANGIOGRAM NECK Date of Exam: 12/27/2023 9:10 PM EST Indication: Neuro deficit, acute stroke suspected Neuro deficit, acute stroke suspected. Comparison: Same day noncontrast head CT. Technique: CTA of the head was performed after the uneventful intravenous administration of 75 mL Isovue-370. Reconstructed coronal and sagittal images were also obtained. In addition, a 3-D volume rendered image was created for interpretation. Automated  exposure control and iterative reconstruction methods were used. Findings: Vascular findings: Bilateral common carotid arteries are patent. Low-grade mixed atheromatous disease of the carotid bifurcations without significant stenosis by NASCET criteria. Bilateral cervical and intracranial portions of the internal carotid arteries are patent. Bilateral MCAs are patent. Bilateral ACAs are patent. Vertebral arteries are patent. Basilar artery is patent. Bilateral PCAs are patent. No evidence of dissection or aneurysm. Nonvascular findings: Visualized lung apices are grossly clear. Glandular tissue is symmetric and normal appearing. Aerodigestive structures are within normal limits. No acute or suspicious soft tissue or osseous lesion. Right thalamic hemorrhage as discussed on noncontrast head CT.     Impression: No evidence of large vessel occlusion, flow-limiting stenosis, dissection, or aneurysm. Electronically Signed: Victor M Guevara MD  12/27/2023 9:46 PM EST  Workstation ID: [id number]    CT Head Without Contrast Stroke Protocol    Result Date: 12/27/2023  CT HEAD WO CONTRAST STROKE PROTOCOL Date of Exam: 12/27/2023 9:03 PM EST Indication: Neuro deficit,  acute, stroke suspected Neuro deficit, acute stroke suspected. Comparison: None available. Technique: Axial CT images were obtained of the head without contrast administration.  Reconstructed coronal images were also obtained. Automated exposure control and iterative construction methods were used. Scan Time: 2105 Results discussed with the stroke coordinator via telephone by Victor M Guevara MD at 2106. Findings: 3.0 x 3.0 x 2.3 cm parenchymal hemorrhage centered in the right thalamus. Mild surrounding edema with mild regional mass effect without overt midline. No evidence of intraventricular blood products. Lacunar infarct in the left basal ganglia. No extra-axial collection. Ventricular system is nondilated. Basal cisterns are patent. No acute or suspicious soft tissue or osseous lesion. Paranasal sinuses and mastoid air cells are well aerated.     Impression: Right thalamic hemorrhage measuring up to 3 cm. Electronically Signed: Victor M Guevara MD  12/27/2023 9:36 PM EST  Workstation ID: [id number]         During this visit the following were done:  Labs Reviewed [x]    Labs Ordered []    Radiology Reports Reviewed [x]    Radiology Ordered []    EKG, echo, and/or stress test reviewed [x]    EEG results reviewed  []    EEG reviewed and interpreted per myself   []    Discussed case with neurointerventionalist or neuroradiologist []    Referring Provider Records Reviewed []    ER Records Reviewed []    Hospital Records Reviewed []    History Obtained From Family [x]    Radiological images view and Interpreted per myself [x]    Case Discussed with referring provider []     Decision to obtain and request outside records  []        Assessment and Plan     Acute right BG ICH with mild mass effect 22 HTN, noncompliant with meds. F/U CTH stable. TME with agitation, fever, suspected aspiration PNA. Started on CIWA for suspected alcohol WD but family denies him drinking.   - Continue ICU observation.   - BP<140/80.   - Minimize  sedatives, Ativan prn agitation.   - ST, PT, OT.                 Electronically signed by Edvin Coffey MD on 12/31/2023 at 12:44 EST

## 2023-12-31 NOTE — PROGRESS NOTES
"INTENSIVIST NOTE     Hospital Day: 4    Mr. Chano Rees, 53 y.o. male is followed for:   ICH       SUBJECTIVE     Interval history:    Maximum temperature 102.3.  Cultures negative.  Remains on empiric antimicrobial therapy.  Follow-up chest x-ray unimpressive for any consolidation.  Mental status continues to wax and wane.  Fluid balance +2.8 L.    The patient's relevant past medical, surgical and social history were reviewed and updated in Epic as appropriate.       OBJECTIVE     Vital Sign Min/Max for last 24 hours  Temp  Min: 97.4 °F (36.3 °C)  Max: 102.3 °F (39.1 °C)   BP  Min: 87/60  Max: 207/94   Pulse  Min: 75  Max: 122   Resp  Min: 28  Max: 40   SpO2  Min: 88 %  Max: 96 %   No data recorded   Weight  Min: 130 kg (286 lb 6 oz)  Max: 130 kg (286 lb 6 oz)      Intake/Output Summary (Last 24 hours) at 12/31/2023 1419  Last data filed at 12/31/2023 1404  Gross per 24 hour   Intake 5151.1 ml   Output 2350 ml   Net 2801.1 ml      Flowsheet Rows      Flowsheet Row First Filed Value   Admission Height 177.8 cm (70\") Documented at 12/27/2023 2152   Admission Weight 125 kg (275 lb) Documented at 12/27/2023 2152               12/28/23  0101 12/30/23  0400 12/31/23  0500   Weight: 126 kg (278 lb 7.1 oz) 127 kg (280 lb 3.3 oz) 130 kg (286 lb 6 oz)            Objective:  General Appearance:  In no acute distress.    Vital signs: (most recent): Blood pressure (!) 207/94, pulse 116, temperature 99.6 °F (37.6 °C), temperature source Axillary, resp. rate (!) 40, height 180.3 cm (71\"), weight 130 kg (286 lb 6 oz), SpO2 (!) 89%.    HEENT: Normal HEENT exam.    Lungs:  Normal effort and normal respiratory rate.  Breath sounds clear to auscultation.  He is not in respiratory distress.  No rales, wheezes or rhonchi.    Heart: Normal rate.  Regular rhythm.  S1 normal and S2 normal.  No murmur, gallop or friction rub.   Chest: Symmetric chest wall expansion.   Abdomen: Abdomen is soft and non-distended.  Bowel sounds are " normal.   There is no abdominal tenderness.   There is no mass. There is no splenomegaly. There is no hepatomegaly.   Extremities: There is no deformity or dependent edema.    Pupils:  Pupils are equal, round, and reactive to light.    Skin:  Warm and dry.            Interval:  (handoff)  1a. Level of Consciousness: 1-->Not alert, but arousable by minor stimulation to obey, answer, or respond  1b. LOC Questions: 1-->Answers one question correctly  1c. LOC Commands: 0-->Performs both tasks correctly  2. Best Gaze: 0-->Normal  3. Visual: 0-->No visual loss  4. Facial Palsy: 1-->Minor paralysis (flattened nasolabial fold, asymmetry on smiling)  5a. Motor Arm, Left: 3-->No effort against gravity, limb falls  5b. Motor Arm, Right: 0-->No drift, limb holds 90 (or 45) degrees for full 10 secs  6a. Motor Leg, Left: 2-->Some effort against gravity, leg falls to bed by 5 secs, but has some effort against gravity  6b. Motor Leg, Right: 0-->No drift, leg holds 30 degree position for full 5 secs  7. Limb Ataxia: 1-->Present in one limb  8. Sensory: 1-->Mild-to-moderate sensory loss, patient feels pinprick is less sharp or is dull on the affected side, or there is a loss of superficial pain with pinprick, but patient is aware of being touched  9. Best Language: 1-->Mild-to-moderate aphasia, some obvious loss of fluency or facility of comprehension, without significant limitation on ideas expressed or form of expression. Reduction of speech and/or comprehension, however, makes conversation. . . (see row details)  10. Dysarthria: 1-->Mild-to-moderate dysarthria, patient slurs at least some words and, at worst, can be understood with some difficulty  11. Extinction and Inattention (formerly Neglect): 0-->No abnormality    Total (NIH Stroke Scale): 12     I reviewed the patient's new clinical results.  I reviewed the patient's new imaging results/reports including actual images and agree with reports.    XR Chest 1 View    Result  Date: 12/31/2023  Impression: Placement of enteric feeding tube which courses into the stomach with the tip beyond the field-of-view. Vague bilateral interstitial opacities suspicious for atypical/viral infection. Electronically Signed: Alex Nieto MD  12/31/2023 8:05 AM EST  Workstation ID: [id number]    CT Head Without Contrast    Result Date: 12/31/2023  Impression: 1.Stable right basal ganglia intraparenchymal hemorrhage extending into the right lateral ventricle. 2.Stable mild mass effect with 3 mm leftward shift of midline. No herniation or acute hydrocephalus. 3.No new hemorrhage. 4.Mild chronic small vessel ischemic change. Electronically Signed: Tal Meier MD  12/31/2023 6:00 AM EST  Workstation ID: [id number]    XR Abdomen KUB    Result Date: 12/29/2023  Impression: Weighted tip feeding catheter tip overlies the duodenal/jejunal junction. Electronically Signed: Victor M Guevara MD  12/29/2023 10:34 PM EST  Workstation ID: [id number]    XR Chest 1 View    Result Date: 12/29/2023  Impression: 1. Mildly increased patchy left basilar interstitial changes, potentially a developing pneumonia. Increased peribronchial thickening potentially bronchitis. 2. Cardiomegaly and mild pulmonary venous hypertension. Electronically Signed: Solomon Carbone MD  12/29/2023 8:58 PM EST  Workstation ID: [id number]    CT Head Without Contrast    Result Date: 12/29/2023  Impression: No significant change with similar 3 cm right thalamic hemorrhage, 3 mm midline shift, and trace intraventricular blood products. Electronically Signed: Victor M Guevara MD  12/29/2023 7:50 PM EST  Workstation ID: [id number]      INFUSIONS  niCARdipine, 5-15 mg/hr, Last Rate: 15 mg/hr (12/31/23 1336)        Results from last 7 days   Lab Units 12/31/23  0405 12/30/23  0354 12/29/23  0430   WBC 10*3/mm3 17.10* 14.97* 19.30*   HEMOGLOBIN g/dL 14.7 14.9 15.3   HEMATOCRIT % 45.6 44.6 47.1   PLATELETS 10*3/mm3 225 256 264     Results from last 7 days   Lab Units  12/31/23  0405 12/30/23  0457 12/29/23  0430 12/28/23  0612 12/27/23  2358   SODIUM mmol/L 138 139 139   < > 139  139   POTASSIUM mmol/L 4.2 4.2 4.6   < > 4.0  4.0   CHLORIDE mmol/L 106 106 106   < > 104  104   CO2 mmol/L 19.0* 21.0* 23.0   < > 22.0  22.0   BUN mg/dL 27* 25* 20   < > 12  12   GLUCOSE mg/dL 98 109* 117*   < > 133*  133*   CREATININE mg/dL 1.15 1.40* 1.30*   < > 1.28*  1.28*   MAGNESIUM mg/dL  --  1.9  --   --  2.3   CALCIUM mg/dL 8.7 8.8 9.4   < > 9.8  9.8   ALBUMIN g/dL  --  3.6  --   --  4.3    < > = values in this interval not displayed.         Results from last 7 days   Lab Units 12/29/23 2042   PH, ARTERIAL pH units 7.545*   PCO2, ARTERIAL mm Hg 25.3*   PO2 ART mm Hg 59.3*   FIO2 % 28       Diet, Tube Feeding Tube Feeding Formula: Peptamen Intense VHP (Peptide Based, Very High Protein)   /h  Patient doesn't have any tube feeding modular orders    Mechanical Ventilator:   Settings: Observed:                                                I reviewed the patient's medications.    Assessment & Plan   ASSESSMENT/PLAN     Active Hospital Problems    Diagnosis     **Rt Thalamic ICH     Hypertension     Non-compliance     History of alcohol abuse        53-year-old male with a past medical history significant for hypertension and poor medical compliance.  He presented to the ER on 12/27 with sudden onset of left-sided weakness and headache.  Blood pressure in /139 initially.  CT of the head revealed a 3 cm right thalamic ICH.  CTA was unremarkable and was felt this was a hypertensive bleed.  He was admitted to the ICU for blood pressure control.  He has a history of alcoholism but reportedly has not been drinking recently    Since arrival in the ICU his neurologic status has tended to wax and wane.  His family has insisted that he has not drank any alcohol recently.  He is getting as needed benzodiazepines but he is not on scheduled benzodiazepines for this reason.  Trying to minimize  sedation.      He has had fever and has been placed empirically on Zosyn but it is unclear whether this is central or related to an infectious process but there is certainly nothing obvious in regards to the latter.  His mental status continues to wax and wane and he can be very sedated but needs some kind of restraint at times.    Plan:    I am going to reduce his normal saline as he should be getting plenty of fluid with his tube feeds and Cardene.  Goal SBP less than 140 mmHg  Cardene drip as needed  Oral Lopressor and lisinopril.  Increase lisinopril.  Nicotine patch  Thiamine and folate  As needed benzodiazepines per CIWA scale  Zosyn empirically  Monitor fever curve.  This could certainly be central as well as infectious related  Continue tube feeds  Lasix x 1 dose     I discussed the patient's findings and my recommendations with patient, family, and nursing staff     Plan of care and goals reviewed with multidisciplinary team at daily rounds.    High level of risk due to:  Acute illness with threat to life or bodily function.    Edvin Child MD  Pulmonary and Critical Care Medicine  12/31/23 14:19 EST

## 2023-12-31 NOTE — PLAN OF CARE
Arouses to verbal stimuli and follows commands. Unable to move LUE. Pt has ongoing confusion and agitation. CIWA utilized and RUE soft restrain used to prevent the removal of lines and medical equipment. Enteral nutrition started. Cardene titrated to maintain SBP<140mmHg. Pt has become increasingly tachypneic through out the day. Pulmonary notified and orders received. Antipyretic given for fever.     Problem: Adult Inpatient Plan of Care  Goal: Absence of Hospital-Acquired Illness or Injury  Intervention: Identify and Manage Fall Risk  Recent Flowsheet Documentation  Taken 12/30/2023 1600 by Ritesh Aguilar RN  Safety Promotion/Fall Prevention:   safety round/check completed   activity supervised  Taken 12/30/2023 1400 by Ritesh Aguilar RN  Safety Promotion/Fall Prevention:   safety round/check completed   activity supervised  Taken 12/30/2023 1200 by Ritesh Aguilar RN  Safety Promotion/Fall Prevention:   safety round/check completed   activity supervised  Taken 12/30/2023 1000 by Ritesh Aguilar RN  Safety Promotion/Fall Prevention:   safety round/check completed   activity supervised  Taken 12/30/2023 0800 by Ritesh Aguilar RN  Safety Promotion/Fall Prevention:   safety round/check completed   activity supervised     Problem: Adult Inpatient Plan of Care  Goal: Absence of Hospital-Acquired Illness or Injury  Intervention: Prevent Skin Injury  Recent Flowsheet Documentation  Taken 12/30/2023 1600 by Ritesh Aguilar RN  Body Position:   side-lying   neutral body alignment  Skin Protection: tubing/devices free from skin contact  Taken 12/30/2023 1400 by Ritesh Aguilar RN  Body Position:   turned   right  Taken 12/30/2023 1200 by Ritesh Aguilar RN  Body Position:   turned   left  Skin Protection: tubing/devices free from skin contact  Taken 12/30/2023 1000 by Ritesh Aguilar RN  Body Position:   weight shifting   neutral body alignment  Taken 12/30/2023 0800 by Ritesh Aguilar RN  Body Position:    turned   right  Skin Protection:   tubing/devices free from skin contact   incontinence pads utilized     Problem: Adult Inpatient Plan of Care  Goal: Absence of Hospital-Acquired Illness or Injury  Intervention: Prevent and Manage VTE (Venous Thromboembolism) Risk  Recent Flowsheet Documentation  Taken 12/30/2023 1600 by Ritesh Aguilar RN  Activity Management: bedrest  Taken 12/30/2023 1400 by Ritesh Aguilar RN  Activity Management: bedrest  Taken 12/30/2023 1200 by Ritesh Aguilar RN  Activity Management: bedrest  VTE Prevention/Management:   sequential compression devices on   bilateral  Taken 12/30/2023 1000 by Ritesh Aguilar RN  Activity Management: bedrest  Taken 12/30/2023 0800 by Ritesh Aguilar RN  Activity Management: bedrest  Range of Motion: ROM (range of motion) performed     Problem: Adult Inpatient Plan of Care  Goal: Absence of Hospital-Acquired Illness or Injury  Intervention: Prevent Infection  Recent Flowsheet Documentation  Taken 12/30/2023 1600 by Ritesh Aguilar RN  Infection Prevention:   environmental surveillance performed   hand hygiene promoted  Taken 12/30/2023 1400 by Ritesh Aguilar RN  Infection Prevention:   environmental surveillance performed   hand hygiene promoted  Taken 12/30/2023 1200 by Ritesh Aguilar RN  Infection Prevention:   environmental surveillance performed   hand hygiene promoted  Taken 12/30/2023 1000 by Ritesh Aguilar RN  Infection Prevention:   environmental surveillance performed   hand hygiene promoted  Taken 12/30/2023 0800 by Ritesh Aguilar RN  Infection Prevention:   environmental surveillance performed   hand hygiene promoted     Problem: Hypertension Comorbidity  Goal: Blood Pressure in Desired Range  Intervention: Maintain Blood Pressure Management  Recent Flowsheet Documentation  Taken 12/30/2023 1600 by Ritesh Aguilar RN  Medication Review/Management: medications reviewed  Taken 12/30/2023 1400 by Ritesh Aguilar, BLAISE  Medication  Review/Management: medications reviewed  Taken 12/30/2023 1200 by Ritesh Aguilar RN  Medication Review/Management: medications reviewed  Taken 12/30/2023 1000 by Ritesh Aguilar RN  Medication Review/Management: medications reviewed  Taken 12/30/2023 0800 by Ritesh Aguilar RN  Medication Review/Management: medications reviewed     Problem: Skin Injury Risk Increased  Goal: Skin Health and Integrity  12/30/2023 1905 by Ritesh Aguilar RN  Outcome: Ongoing, Progressing  12/30/2023 1858 by Ritesh Aguilar RN  Outcome: Ongoing, Progressing  Intervention: Optimize Skin Protection  Recent Flowsheet Documentation  Taken 12/30/2023 1600 by Ritesh Aguilar RN  Pressure Reduction Techniques: weight shift assistance provided  Head of Bed (HOB) Positioning: HOB at 30-45 degrees  Pressure Reduction Devices: specialty bed utilized  Skin Protection: tubing/devices free from skin contact  Taken 12/30/2023 1400 by Ritesh Aguilar RN  Head of Bed (HOB) Positioning: HOB at 30-45 degrees  Taken 12/30/2023 1200 by Ritesh Aguilar RN  Pressure Reduction Techniques: weight shift assistance provided  Head of Bed (HOB) Positioning: HOB at 30-45 degrees  Pressure Reduction Devices: specialty bed utilized  Skin Protection: tubing/devices free from skin contact  Taken 12/30/2023 1000 by Ritesh Aguilar RN  Head of Bed (HOB) Positioning: HOB at 30-45 degrees  Taken 12/30/2023 0800 by Ritesh Aguilar RN  Pressure Reduction Techniques:   weight shift assistance provided   pressure points protected   heels elevated off bed  Head of Bed (HOB) Positioning: HOB at 30-45 degrees  Pressure Reduction Devices:   specialty bed utilized   positioning supports utilized  Skin Protection:   tubing/devices free from skin contact   incontinence pads utilized

## 2023-12-31 NOTE — PLAN OF CARE
Goal Outcome Evaluation: Pt follows commands and occasionally opens eyes. No movement with LUE. Able to answer yes/no questions. Continues to be agitated and restless. RUE soft restraint used to prevent pulling of lines. Titrating cardene to maintain SBP<140mmHg. Bipap ordered for increasing tachypnea.      Problem: Adult Inpatient Plan of Care  Goal: Absence of Hospital-Acquired Illness or Injury  Intervention: Prevent Skin Injury  Recent Flowsheet Documentation  Taken 12/31/2023 1800 by Ritesh Aguilar RN  Body Position:   weight shifting   neutral body alignment  Taken 12/31/2023 1600 by Ritesh Aguilar RN  Body Position:   turned   left  Skin Protection: tubing/devices free from skin contact  Taken 12/31/2023 1400 by Ritesh Aguilar RN  Body Position:   weight shifting   neutral body alignment  Taken 12/31/2023 1200 by Ritesh Aguilar RN  Body Position:   weight shifting   right  Skin Protection: tubing/devices free from skin contact  Taken 12/31/2023 0800 by Ritesh Aguilar RN  Body Position:   weight shifting   neutral body alignment  Skin Protection: tubing/devices free from skin contact     Problem: Adult Inpatient Plan of Care  Goal: Absence of Hospital-Acquired Illness or Injury  Intervention: Prevent and Manage VTE (Venous Thromboembolism) Risk  Recent Flowsheet Documentation  Taken 12/31/2023 1800 by Ritesh Aguilar RN  Activity Management: bedrest  Taken 12/31/2023 1600 by Ritesh Aguilar RN  Activity Management: bedrest  VTE Prevention/Management:   sequential compression devices on   bilateral  Taken 12/31/2023 1400 by Ritesh Aguilar RN  Activity Management: bedrest  Taken 12/31/2023 1200 by Ritesh Aguilar RN  Activity Management: bedrest  VTE Prevention/Management:   sequential compression devices on   bilateral  Taken 12/31/2023 0800 by Ritesh Aguilar RN  Activity Management: bedrest  VTE Prevention/Management:   sequential compression devices on   bilateral     Problem: Hypertension  Comorbidity  Goal: Blood Pressure in Desired Range  Intervention: Maintain Blood Pressure Management  Recent Flowsheet Documentation  Taken 12/31/2023 1800 by Ritesh Aguilar RN  Medication Review/Management: medications reviewed  Taken 12/31/2023 1600 by Ritesh Aguilar RN  Medication Review/Management: medications reviewed  Taken 12/31/2023 1400 by Ritesh Aguilar RN  Medication Review/Management: medications reviewed  Taken 12/31/2023 1200 by Ritesh Aguilar RN  Medication Review/Management: medications reviewed  Taken 12/31/2023 0800 by Ritesh Aguilar RN  Medication Review/Management: medications reviewed     Problem: Restraint, Nonviolent  Goal: Absence of Harm or Injury  Outcome: Ongoing, Progressing  Intervention: Implement Least Restrictive Safety Strategies  Recent Flowsheet Documentation  Taken 12/31/2023 1800 by Ritesh Aguilar RN  Medical Device Protection: tubing secured  Taken 12/31/2023 1600 by Ritesh Aguilar RN  Medical Device Protection: tubing secured  Taken 12/31/2023 1400 by Ritesh Aguilar RN  Medical Device Protection: tubing secured  Taken 12/31/2023 1200 by Ritesh Aguilar RN  Medical Device Protection: tubing secured  Taken 12/31/2023 0800 by Ritesh Aguilar RN  Medical Device Protection: tubing secured  Less Restrictive Alternative: bed alarm in use  De-Escalation Techniques:   verbally redirected   reoriented  Diversional Activities: television  Intervention: Protect Dignity, Rights, and Personal Wellbeing  Recent Flowsheet Documentation  Taken 12/31/2023 1600 by Ritesh Aguilar RN  Trust Relationship/Rapport:   care explained   questions answered  Taken 12/31/2023 1200 by Ritesh Aguilar RN  Trust Relationship/Rapport:   care explained   questions answered  Taken 12/31/2023 0800 by Ritesh Aguilar RN  Trust Relationship/Rapport:   care explained   questions answered  Intervention: Protect Skin and Joint Integrity  Recent Flowsheet Documentation  Taken 12/31/2023 1800 by  Ritesh Aguilar RN  Body Position:   weight shifting   neutral body alignment  Taken 12/31/2023 1600 by Ritesh Aguilar RN  Body Position:   turned   left  Taken 12/31/2023 1400 by Ritesh Aguilar RN  Body Position:   weight shifting   neutral body alignment  Taken 12/31/2023 1200 by Ritesh Aguilar RN  Body Position:   weight shifting   right  Taken 12/31/2023 0800 by Ritesh Aguilar RN  Body Position:   weight shifting   neutral body alignment

## 2023-12-31 NOTE — CONSULTS
Clinical Nutrition     Nutrition Support Assessment  Reason for Visit: Physician consult, EN      Patient Name: Chano Rees  YOB: 1970  MRN: [id number]  Date of Encounter: 12/30/23 19:11 EST  Admission date: 12/27/2023    Comments: EN ordered:   When ready to feed begin Peptamen VHP 20 ml/hr advance by 20 ml/hr ev 12 hr w tolerance to goal 80 ml/hr Water at 30 ml ev 2 hr One Prosource/da.  At goal over 20hr to supply 1600 ml for 1660 Kcal 101% est need, 162 g % est need, 6.4 g Fiber, 1344 ml Water in EN, 1644 total ml Free Water  Slow initiation; watch lytes.     Nutrition Assessment   Admission Diagnosis:  ICH (intracerebral hemorrhage) [I61.9]      Problem List:    Rt Thalamic ICH    Hypertension    Non-compliance    History of alcohol abuse        PMH:   He  has a past medical history of Hypertension.    PSH:  He  has a past surgical history that includes Ankle surgery (Left).      Applicable Nutrition Concerns:   Skin:  Oral:  GI:      Applicable Interval History:         Reported/Observed/Food/Nutrition Related History:     12/30  Order for EN 2/2 confusion. Not taking diet.       Labs    Labs Reviewed: Yes     Results from last 7 days   Lab Units 12/30/23  0457 12/30/23  0354 12/30/23  0108 12/29/23  2200 12/29/23  0430 12/28/23  1804 12/28/23  1322 12/28/23  0612 12/27/23  2358 12/27/23  2117   GLUCOSE mg/dL 109*  --   --   --  117* 163*  --    < > 133*  133*  --    BUN mg/dL 25*  --   --   --  20 21*  --    < > 12  12  --    CREATININE mg/dL 1.40*  --   --   --  1.30* 1.53*  --    < > 1.28*  1.28*  --    SODIUM mmol/L 139  --   --   --  139 142  142  --    < > 139  139  --    CHLORIDE mmol/L 106  --   --   --  106 107  --    < > 104  104  --    POTASSIUM mmol/L 4.2  --   --   --  4.6 4.3  --    < > 4.0  4.0  --    PHOSPHORUS mg/dL 4.7*  --   --   --   --   --  3.6  --  1.5*  --    MAGNESIUM mg/dL 1.9  --   --   --   --   --   --   --  2.3  --    ALT (SGPT)  "U/L  --   --   --   --   --   --   --   --  41 40   LACTATE mmol/L  --  1.6 2.2* 3.9*  --   --   --   --   --   --     < > = values in this interval not displayed.       Results from last 7 days   Lab Units 12/30/23  0457 12/27/23  2358   ALBUMIN g/dL 3.6 4.3   IONIZED CALCIUM mmol/L  --  1.33*   CHOLESTEROL mg/dL  --  203*   TRIGLYCERIDES mg/dL  --  121       Results from last 7 days   Lab Units 12/30/23  1732 12/30/23  1122 12/29/23  2329 12/28/23  1649 12/28/23  1212 12/28/23  0615   GLUCOSE mg/dL 84 91 105 135* 119 148*     Lab Results   Lab Value Date/Time    HGBA1C 5.30 12/27/2023 2358         Results from last 7 days   Lab Units 12/27/23  2117   PROBNP pg/mL 322.9         Medications    Medications Reviewed: Yes  Pertinent: insulin pediatric multi vit (?), abx   Infusion:cardene @ 15, NS @ 75  PRN:       Intake/Ouptut 24 hrs (0701 - 0700)   I&O's Reviewed: Yes     Intake & Output (last day)         12/30 0701  12/31 0700    P.O.     I.V. (mL/kg) 2088 (16.4)    Other 60    NG/GT 44    IV Piggyback 146.3    Total Intake(mL/kg) 2338.3 (18.4)    Urine (mL/kg/hr) 1950 (1.3)    Stool     Total Output 1950    Net +388.3               Stool x 3 on 12/29    Anthropometrics     Flowsheet Rows      Flowsheet Row First Filed Value   Admission Height 177.8 cm (70\") Documented at 12/27/2023 2152   Admission Weight 125 kg (275 lb) Documented at 12/27/2023 2152     Height: Height: 180.3 cm (71\")  Last Filed Weight: Weight: 127 kg (280 lb 3.3 oz) (12/30/23 0400)  Method: Weight Method: Bed scale  BMI: BMI (Calculated): 39.1  BMI classification: Obese Class II: 35-39.9kg/m2    UBW: unk at this time wt of 278 on 12/28, wt of 275 lbs unk method 12/27  Weight change:     Nutrition Focused Physical Exam     Date: 12/30    Unable to perform exam due to: Defer pending indication    Needs Assessment   Date:12/30    Height used:Height: 180.3 cm (71\")  Weights used: 127 Kg 280 lbs      Estimated Calorie needs: ~1650  Kcal/day  Method:  " Kcals/KG 12-14 = 1524 - 1778    Estimated Protein needs: ~  159 g PRO/day  Method: g/Kg 1.25    Estimated Fluid needs: ~ ml/day   Per clinical status    Current Nutrition Prescription     PO: NPO Diet NPO Type: Strict NPO  Oral Nutrition Supplement:   Intake: N/A    EN: Peptamen Intense VHP  Goal Rate:80 ml/hr  Water Flushes: 30 ml ev 2 hr  Modular: Prosource -no carb 1/day  Route: ND  Tube: Small bore    At goal over: 20Hrs/day    Rx will supply:   Goal Volume 1600  mL/day     Flush Volume 300 mL/day     Energy 1660 Kcal/day 101 % Est Need   Protein 162 g/day 102 % Est Need   Fiber 6.4 g/day     Water in  EN 1344 mL     Total Water 1644 mL     Meet DRI Yes        --------------------------------------------------------------------------  Product/Rate verified at bedside: No  Infusing Rate at time of visit: not started at time of visit    Average Delivery from Charting: N/A  Volume  mL/day   % Goal Vol.   Flush Volume  mL/day     Energy  Kcal/day  % Est Need   Protein  g/day  % Est Need   Fiber  g/day     Water in  EN  mL     Total Water  mL     Meet DRI N/A              Nutrition Diagnosis   Date: 12/30 Updated:   Problem Inadequate oral intake   Etiology confusion   Signs/Symptoms Rpt pt not able to take po at this time   Status: EN order placed      Goal:   General: Nutrition support treatment  PO: Advace diet as medically feasible/appropriate  EN/PN: Initiate EN, Tolerate EN at goal    Nutrition Intervention      Follow treatment progress, Care plan reviewed, Nutrition support order placed        Monitoring/Evaluation:   Per protocol, I&O, Pertinent labs, EN delivery/tolerance, Weight, Symptoms, Swallow function      Agatha Spencer RD  Time Spent: 45 min

## 2024-01-01 ENCOUNTER — APPOINTMENT (OUTPATIENT)
Dept: GENERAL RADIOLOGY | Facility: HOSPITAL | Age: 54
End: 2024-01-01
Payer: COMMERCIAL

## 2024-01-01 LAB
ALBUMIN SERPL-MCNC: 3.2 G/DL (ref 3.5–5.2)
ALBUMIN/GLOB SERPL: 1 G/DL
ALP SERPL-CCNC: 66 U/L (ref 39–117)
ALT SERPL W P-5'-P-CCNC: 23 U/L (ref 1–41)
ANION GAP SERPL CALCULATED.3IONS-SCNC: 12 MMOL/L (ref 5–15)
ARTERIAL PATENCY WRIST A: ABNORMAL
AST SERPL-CCNC: 26 U/L (ref 1–40)
ATMOSPHERIC PRESS: ABNORMAL MM[HG]
BASE EXCESS BLDA CALC-SCNC: -2.1 MMOL/L (ref 0–2)
BASOPHILS # BLD AUTO: 0.07 10*3/MM3 (ref 0–0.2)
BASOPHILS NFR BLD AUTO: 0.6 % (ref 0–1.5)
BDY SITE: ABNORMAL
BILIRUB SERPL-MCNC: 0.8 MG/DL (ref 0–1.2)
BODY TEMPERATURE: 37
BUN SERPL-MCNC: 28 MG/DL (ref 6–20)
BUN/CREAT SERPL: 23.7 (ref 7–25)
CALCIUM SPEC-SCNC: 8.6 MG/DL (ref 8.6–10.5)
CHLORIDE SERPL-SCNC: 107 MMOL/L (ref 98–107)
CHOLEST SERPL-MCNC: 156 MG/DL (ref 0–200)
CO2 BLDA-SCNC: 22.5 MMOL/L (ref 22–33)
CO2 SERPL-SCNC: 20 MMOL/L (ref 22–29)
COHGB MFR BLD: 1.3 % (ref 0–2)
CREAT SERPL-MCNC: 1.18 MG/DL (ref 0.76–1.27)
CRP SERPL-MCNC: 8.97 MG/DL (ref 0–0.5)
DEPRECATED RDW RBC AUTO: 48.4 FL (ref 37–54)
EGFRCR SERPLBLD CKD-EPI 2021: 73.8 ML/MIN/1.73
EOSINOPHIL # BLD AUTO: 0.27 10*3/MM3 (ref 0–0.4)
EOSINOPHIL NFR BLD AUTO: 2.2 % (ref 0.3–6.2)
EPAP: 8
ERYTHROCYTE [DISTWIDTH] IN BLOOD BY AUTOMATED COUNT: 14.5 % (ref 12.3–15.4)
GLOBULIN UR ELPH-MCNC: 3.2 GM/DL
GLUCOSE BLDC GLUCOMTR-MCNC: 108 MG/DL (ref 70–130)
GLUCOSE BLDC GLUCOMTR-MCNC: 112 MG/DL (ref 70–130)
GLUCOSE BLDC GLUCOMTR-MCNC: 88 MG/DL (ref 70–130)
GLUCOSE BLDC GLUCOMTR-MCNC: 89 MG/DL (ref 70–130)
GLUCOSE SERPL-MCNC: 97 MG/DL (ref 65–99)
HCO3 BLDA-SCNC: 21.5 MMOL/L (ref 20–26)
HCT VFR BLD AUTO: 44.1 % (ref 37.5–51)
HCT VFR BLD CALC: 44.2 % (ref 38–51)
HGB BLD-MCNC: 14.5 G/DL (ref 13–17.7)
HGB BLDA-MCNC: 14.4 G/DL (ref 13.5–17.5)
IMM GRANULOCYTES # BLD AUTO: 0.24 10*3/MM3 (ref 0–0.05)
IMM GRANULOCYTES NFR BLD AUTO: 2 % (ref 0–0.5)
INHALED O2 CONCENTRATION: 50 %
IPAP: 16
LYMPHOCYTES # BLD AUTO: 1.28 10*3/MM3 (ref 0.7–3.1)
LYMPHOCYTES NFR BLD AUTO: 10.6 % (ref 19.6–45.3)
MAGNESIUM SERPL-MCNC: 2.6 MG/DL (ref 1.6–2.6)
MCH RBC QN AUTO: 30 PG (ref 26.6–33)
MCHC RBC AUTO-ENTMCNC: 32.9 G/DL (ref 31.5–35.7)
MCV RBC AUTO: 91.1 FL (ref 79–97)
METHGB BLD QL: 0.2 % (ref 0–1.5)
MODALITY: ABNORMAL
MONOCYTES # BLD AUTO: 1.81 10*3/MM3 (ref 0.1–0.9)
MONOCYTES NFR BLD AUTO: 15 % (ref 5–12)
NEUTROPHILS NFR BLD AUTO: 69.6 % (ref 42.7–76)
NEUTROPHILS NFR BLD AUTO: 8.41 10*3/MM3 (ref 1.7–7)
NRBC BLD AUTO-RTO: 0 /100 WBC (ref 0–0.2)
OXYHGB MFR BLDV: 95.3 % (ref 94–99)
PAW @ PEAK INSP FLOW SETTING VENT: 0 CMH2O
PCO2 BLDA: 32.7 MM HG (ref 35–45)
PCO2 TEMP ADJ BLD: 32.7 MM HG (ref 35–48)
PH BLDA: 7.42 PH UNITS (ref 7.35–7.45)
PH, TEMP CORRECTED: 7.42 PH UNITS
PHOSPHATE SERPL-MCNC: 2.7 MG/DL (ref 2.5–4.5)
PLATELET # BLD AUTO: 232 10*3/MM3 (ref 140–450)
PMV BLD AUTO: 11 FL (ref 6–12)
PO2 BLDA: 80 MM HG (ref 83–108)
PO2 TEMP ADJ BLD: 80 MM HG (ref 83–108)
POTASSIUM SERPL-SCNC: 3.4 MMOL/L (ref 3.5–5.2)
POTASSIUM SERPL-SCNC: 4.1 MMOL/L (ref 3.5–5.2)
PREALB SERPL-MCNC: 15.8 MG/DL (ref 20–40)
PROT SERPL-MCNC: 6.4 G/DL (ref 6–8.5)
RBC # BLD AUTO: 4.84 10*6/MM3 (ref 4.14–5.8)
SODIUM SERPL-SCNC: 139 MMOL/L (ref 136–145)
TOTAL RATE: 16 BREATHS/MINUTE
TRIGL SERPL-MCNC: 110 MG/DL (ref 0–150)
WBC NRBC COR # BLD AUTO: 12.08 10*3/MM3 (ref 3.4–10.8)

## 2024-01-01 PROCEDURE — 84134 ASSAY OF PREALBUMIN: CPT

## 2024-01-01 PROCEDURE — 80053 COMPREHEN METABOLIC PANEL: CPT

## 2024-01-01 PROCEDURE — 86140 C-REACTIVE PROTEIN: CPT

## 2024-01-01 PROCEDURE — 71045 X-RAY EXAM CHEST 1 VIEW: CPT

## 2024-01-01 PROCEDURE — 82948 REAGENT STRIP/BLOOD GLUCOSE: CPT

## 2024-01-01 PROCEDURE — 84478 ASSAY OF TRIGLYCERIDES: CPT

## 2024-01-01 PROCEDURE — 25010000002 LABETALOL 5 MG/ML SOLUTION: Performed by: NURSE PRACTITIONER

## 2024-01-01 PROCEDURE — 25810000003 SODIUM CHLORIDE 0.9 % SOLUTION 250 ML FLEX CONT: Performed by: INTERNAL MEDICINE

## 2024-01-01 PROCEDURE — 97530 THERAPEUTIC ACTIVITIES: CPT

## 2024-01-01 PROCEDURE — 84132 ASSAY OF SERUM POTASSIUM: CPT | Performed by: INTERNAL MEDICINE

## 2024-01-01 PROCEDURE — 83050 HGB METHEMOGLOBIN QUAN: CPT

## 2024-01-01 PROCEDURE — 25010000002 MIDAZOLAM PER 1 MG: Performed by: INTERNAL MEDICINE

## 2024-01-01 PROCEDURE — 85025 COMPLETE CBC W/AUTO DIFF WBC: CPT | Performed by: INTERNAL MEDICINE

## 2024-01-01 PROCEDURE — 25010000002 PIPERACILLIN SOD-TAZOBACTAM PER 1 G: Performed by: INTERNAL MEDICINE

## 2024-01-01 PROCEDURE — 99232 SBSQ HOSP IP/OBS MODERATE 35: CPT | Performed by: PSYCHIATRY & NEUROLOGY

## 2024-01-01 PROCEDURE — 82375 ASSAY CARBOXYHB QUANT: CPT

## 2024-01-01 PROCEDURE — 83735 ASSAY OF MAGNESIUM: CPT

## 2024-01-01 PROCEDURE — 82805 BLOOD GASES W/O2 SATURATION: CPT

## 2024-01-01 PROCEDURE — 25010000002 NICARDIPINE 2.5 MG/ML SOLUTION 10 ML VIAL: Performed by: INTERNAL MEDICINE

## 2024-01-01 PROCEDURE — 25010000002 PIPERACILLIN SOD-TAZOBACTAM PER 1 G: Performed by: NURSE PRACTITIONER

## 2024-01-01 PROCEDURE — 82465 ASSAY BLD/SERUM CHOLESTEROL: CPT

## 2024-01-01 PROCEDURE — 99233 SBSQ HOSP IP/OBS HIGH 50: CPT | Performed by: INTERNAL MEDICINE

## 2024-01-01 PROCEDURE — 25010000002 NICARDIPINE 2.5 MG/ML SOLUTION 10 ML VIAL: Performed by: EMERGENCY MEDICINE

## 2024-01-01 PROCEDURE — 94799 UNLISTED PULMONARY SVC/PX: CPT

## 2024-01-01 PROCEDURE — 36600 WITHDRAWAL OF ARTERIAL BLOOD: CPT

## 2024-01-01 PROCEDURE — 84100 ASSAY OF PHOSPHORUS: CPT

## 2024-01-01 PROCEDURE — 25010000002 FUROSEMIDE PER 20 MG: Performed by: INTERNAL MEDICINE

## 2024-01-01 PROCEDURE — 25810000003 SODIUM CHLORIDE 0.9 % SOLUTION 250 ML FLEX CONT: Performed by: EMERGENCY MEDICINE

## 2024-01-01 RX ORDER — FUROSEMIDE 10 MG/ML
40 INJECTION INTRAMUSCULAR; INTRAVENOUS ONCE
Status: COMPLETED | OUTPATIENT
Start: 2024-01-01 | End: 2024-01-01

## 2024-01-01 RX ORDER — POTASSIUM CHLORIDE 1.5 G/1.58G
40 POWDER, FOR SOLUTION ORAL EVERY 4 HOURS
Status: COMPLETED | OUTPATIENT
Start: 2024-01-01 | End: 2024-01-01

## 2024-01-01 RX ADMIN — NICARDIPINE HYDROCHLORIDE 10 MG/HR: 25 INJECTION, SOLUTION INTRAVENOUS at 23:53

## 2024-01-01 RX ADMIN — MIDAZOLAM HYDROCHLORIDE 2 MG: 1 INJECTION, SOLUTION INTRAMUSCULAR; INTRAVENOUS at 23:25

## 2024-01-01 RX ADMIN — LISINOPRIL 20 MG: 20 TABLET ORAL at 08:45

## 2024-01-01 RX ADMIN — LORAZEPAM 1 MG: 1 TABLET ORAL at 20:02

## 2024-01-01 RX ADMIN — LORAZEPAM 2 MG: 1 TABLET ORAL at 03:04

## 2024-01-01 RX ADMIN — LABETALOL HYDROCHLORIDE 20 MG: 5 INJECTION INTRAVENOUS at 13:07

## 2024-01-01 RX ADMIN — POTASSIUM CHLORIDE 40 MEQ: 1.5 FOR SOLUTION ORAL at 08:45

## 2024-01-01 RX ADMIN — LORAZEPAM 2 MG: 1 TABLET ORAL at 21:38

## 2024-01-01 RX ADMIN — METOPROLOL TARTRATE 50 MG: 50 TABLET ORAL at 08:45

## 2024-01-01 RX ADMIN — NICARDIPINE HYDROCHLORIDE 7.5 MG/HR: 25 INJECTION, SOLUTION INTRAVENOUS at 06:35

## 2024-01-01 RX ADMIN — ACETAMINOPHEN 650 MG: 650 SOLUTION ORAL at 20:17

## 2024-01-01 RX ADMIN — PIPERACILLIN SODIUM AND TAZOBACTAM SODIUM 3.38 G: 3; .375 INJECTION, SOLUTION INTRAVENOUS at 14:14

## 2024-01-01 RX ADMIN — FUROSEMIDE 40 MG: 10 INJECTION, SOLUTION INTRAMUSCULAR; INTRAVENOUS at 14:14

## 2024-01-01 RX ADMIN — LORAZEPAM 2 MG: 1 TABLET ORAL at 12:41

## 2024-01-01 RX ADMIN — NICARDIPINE HYDROCHLORIDE 5 MG/HR: 25 INJECTION, SOLUTION INTRAVENOUS at 14:14

## 2024-01-01 RX ADMIN — Medication 1 TABLET: at 08:44

## 2024-01-01 RX ADMIN — LORAZEPAM 2 MG: 1 TABLET ORAL at 06:07

## 2024-01-01 RX ADMIN — NICARDIPINE HYDROCHLORIDE 5 MG/HR: 25 INJECTION, SOLUTION INTRAVENOUS at 18:01

## 2024-01-01 RX ADMIN — PIPERACILLIN SODIUM AND TAZOBACTAM SODIUM 4.5 G: 4; .5 INJECTION, SOLUTION INTRAVENOUS at 21:38

## 2024-01-01 RX ADMIN — Medication 30 ML: at 08:50

## 2024-01-01 RX ADMIN — Medication 10 ML: at 20:49

## 2024-01-01 RX ADMIN — POTASSIUM CHLORIDE 40 MEQ: 1.5 FOR SOLUTION ORAL at 12:41

## 2024-01-01 RX ADMIN — PIPERACILLIN SODIUM AND TAZOBACTAM SODIUM 3.38 G: 3; .375 INJECTION, SOLUTION INTRAVENOUS at 06:07

## 2024-01-01 RX ADMIN — Medication 10 ML: at 08:50

## 2024-01-01 RX ADMIN — NICARDIPINE HYDROCHLORIDE 7.5 MG/HR: 25 INJECTION, SOLUTION INTRAVENOUS at 03:11

## 2024-01-01 RX ADMIN — LORAZEPAM 2 MG: 1 TABLET ORAL at 04:20

## 2024-01-01 RX ADMIN — Medication 1 PATCH: at 08:44

## 2024-01-01 RX ADMIN — METOPROLOL TARTRATE 50 MG: 50 TABLET ORAL at 20:02

## 2024-01-01 NOTE — PROGRESS NOTES
Neurology Note    Patient:  Chano eRes    YOB: 1970    REFERRING PHYSICIAN:  Dr. Ziegler    CHIEF COMPLAINT:    Left-sided weakness    HISTORY OF PRESENT ILLNESS:   The patient is more calm, getting prn Ativan, Tm 100.2. Remains on Cardene. Getting TF.    Past Medical History:  Past Medical History:   Diagnosis Date    Hypertension        Past Surgical History:  Past Surgical History:   Procedure Laterality Date    ANKLE SURGERY Left        Social History:   Social History     Socioeconomic History    Marital status: Single   Tobacco Use    Smoking status: Former     Types: Cigarettes    Smokeless tobacco: Current     Types: Chew   Vaping Use    Vaping Use: Never used   Substance and Sexual Activity    Alcohol use: Not Currently    Drug use: Not Currently    Sexual activity: Defer        Family History:   History reviewed. No pertinent family history.    Medications Prior to Admission:    Prior to Admission medications    Not on File       Allergies:  Shellfish-derived products      Review of system  Review of Systems   Unable to perform ROS: Mental status change       Vitals:    01/01/24 1307   BP: 143/96   Pulse: 96   Resp:    Temp:    SpO2:        Physical exam  Physical Exam  Cardiovascular:      Rate and Rhythm: Normal rate and regular rhythm.   Pulmonary:      Effort: Tachypnea present.   Neurological:      Comments: Alerts to voice, makes eye contact, inattentive, speech dysarthric, moves right side well, left side with trace movement.           Lab Results   Component Value Date    WBC 12.08 (H) 01/01/2024    HGB 14.5 01/01/2024    HCT 44.1 01/01/2024    MCV 91.1 01/01/2024     01/01/2024     Lab Results   Component Value Date    GLUCOSE 97 01/01/2024    BUN 28 (H) 01/01/2024    CREATININE 1.18 01/01/2024    BCR 23.7 01/01/2024    CO2 20.0 (L) 01/01/2024    CALCIUM 8.6 01/01/2024    ALBUMIN 3.2 (L) 01/01/2024    AST 26 01/01/2024    ALT 23 01/01/2024         Radiological  Studies:   CT HEAD WO CONTRAST     Date of Exam: 12/31/2023 5:35 AM EST     Indication: Stroke, follow up.     Comparison: 12/29/2023     Technique: Axial CT images were obtained of the head without contrast administration.  Automated exposure control and iterative construction methods were used.        Findings:  There is a stable intraparenchymal hemorrhage in the right basal ganglia. This measures up to 3.2 x 2.1 x 4.0 cm (approximately 13 cc) and extends into the right lateral ventricle. There is trace intraventricular hemorrhage, which is slightly decreased   compared to the prior study. There is no new hemorrhage. There is edema surrounding the right basal ganglia hemorrhage with mild mass effect and 3 mm leftward shift of midline. There is no evidence of herniation or acute hydrocephalus. There is mild   stable underlying chronic small vessel ischemic change. No new hypoattenuating lesions in the brain. There are mild intracranial vascular calcifications. There is mild right maxillary sinus mucosal disease. The mastoids are clear. The calvarium is   intact. Orbits are unremarkable. Superficial soft tissues appear within normal limits.     IMPRESSION:  Impression:  1.Stable right basal ganglia intraparenchymal hemorrhage extending into the right lateral ventricle.  2.Stable mild mass effect with 3 mm leftward shift of midline. No herniation or acute hydrocephalus.  3.No new hemorrhage.  4.Mild chronic small vessel ischemic change.             During this visit the following were done:  Labs Reviewed [x]    Labs Ordered []    Radiology Reports Reviewed [x]    Radiology Ordered []    EKG, echo, and/or stress test reviewed []    EEG results reviewed  []    EEG reviewed and interpreted per myself   []    Discussed case with neurointerventionalist or neuroradiologist []    Referring Provider Records Reviewed []    ER Records Reviewed []    Hospital Records Reviewed []    History Obtained From Family []     Radiological images view and Interpreted per myself [x]    Case Discussed with referring provider []     Decision to obtain and request outside records  []        Assessment and Plan     Acute right BG ICH with mild mass effect 22 HTN, noncompliant with meds. F/U CTH stable on 12/31. TME with agitation, fever, suspected aspiration PNA and pulmonary edema, improved with Lasix. Started on CIWA for suspected alcohol WD but family denies him drinking. Neurologically stable.   - Continue ICU observation.   - BP<140/80.   - Minimize sedatives, Ativan prn agitation.   - ST, PT, OT. Likely subacute rehab.   - Stroke neurology clinic F/U.    Call for questions, will see prn. Thanks.                 Electronically signed by Edvin Coffey MD on 1/1/2024 at 13:15 EST

## 2024-01-01 NOTE — THERAPY TREATMENT NOTE
Patient Name: Chano Rees  : 1970    MRN: [id number]                              Today's Date: 2024       Admit Date: 2023    Visit Dx:     ICD-10-CM ICD-9-CM   1. Intracranial hemorrhage  I62.9 432.9   2. Uncontrolled hypertension  I10 401.9   3. Personal history of noncompliance with medical treatment  Z91.199 V15.81   4. Cognitive communication deficit  R41.841 799.52     Patient Active Problem List   Diagnosis    Rt Thalamic ICH    Hypertension    Non-compliance    History of alcohol abuse     Past Medical History:   Diagnosis Date    Hypertension      Past Surgical History:   Procedure Laterality Date    ANKLE SURGERY Left       General Information       Row Name 24 1353          Physical Therapy Time and Intention    Document Type therapy note (daily note)  -AY     Mode of Treatment physical therapy;co-treatment  -AY       Row Name 24 8350          General Information    Patient Profile Reviewed yes  -AY     Existing Precautions/Restrictions fall;other (see comments)  L neglect, L sided weakness (UE>LE), L visual deficit  -AY     Barriers to Rehab medically complex;cognitive status;visual deficit  -AY       Row Name 24 1356          Cognition    Orientation Status (Cognition) unable/difficult to assess  difficult to arouse this date  -AY       Row Name 24 2764          Safety Issues, Functional Mobility    Safety Issues Affecting Function (Mobility) ability to follow commands;awareness of need for assistance;safety precaution awareness;sequencing abilities;insight into deficits/self-awareness  -AY     Impairments Affecting Function (Mobility) balance;cognition;coordination;endurance/activity tolerance;motor control;motor planning;postural/trunk control;range of motion (ROM);strength;visual/perceptual  -AY     Cognitive Impairments, Mobility Safety/Performance attention  -AY               User Key  (r) = Recorded By, (t) = Taken By, (c) = Cosigned By       Initials Name Provider Type    Fabby Quiroga PT Physical Therapist                   Mobility       Row Name 01/01/24 1354          Bed Mobility    Bed Mobility supine-sit;sit-supine;scooting/bridging;rolling left;rolling right  -AY     Rolling Left [place] (Bed Mobility) maximum assist (25% patient effort);2 person assist;verbal cues  -AY     Rolling Right [place] (Bed Mobility) maximum assist (25% patient effort);2 person assist;verbal cues  -AY     Scooting/Bridging [place] (Bed Mobility) dependent (less than 25% patient effort);2 person assist;verbal cues  -AY     Supine-Sit [place] (Bed Mobility) dependent (less than 25% patient effort);2 person assist;verbal cues  -AY     Sit-Supine [place] (Bed Mobility) dependent (less than 25% patient effort);2 person assist;verbal cues  -AY     Assistive Device (Bed Mobility) draw sheet;bed rails;head of bed elevated  -AY       Row Name 01/01/24 1354          Transfers    Comment, (Transfers) deferred further mobility d/t poor command following and poor sitting balance.  -AY               User Key  (r) = Recorded By, (t) = Taken By, (c) = Cosigned By      Initials Name Provider Type    Fabby Quiroga PT Physical Therapist                   Obj/Interventions       Row Name 01/01/24 1352          Balance    Balance Assessment sitting static balance;sitting dynamic balance  -AY     Static Sitting Balance maximum assist  -AY     Dynamic Sitting Balance dependent  -AY     Position, Sitting Balance unsupported;sitting edge of bed  -AY     Position/Device Used, Standing Balance unsupported  -AY               User Key  (r) = Recorded By, (t) = Taken By, (c) = Cosigned By      Initials Name Provider Type    Fabby Quiroga PT Physical Therapist                   Goals/Plan    No documentation.                  Clinical Impression       Row Name 01/01/24 1356          Pain    Pretreatment Pain Rating 0/10 - no pain  -AY     Posttreatment Pain Rating  0/10 - no pain  -AY     Pain Intervention(s) Ambulation/increased activity;Repositioned  -AY     Additional Documentation Pain Scale: Numbers Pre/Post-Treatment (Group)  -AY       Row Name 01/01/24 1356          Plan of Care Review    Plan of Care Reviewed With patient  -AY     Progress no change  -AY     Outcome Evaluation Pt tolerated sitting EOB with max A/total A, limited by impaired cognition with decreased command following noted. Cont to progress as able. d/c rec for IRF.  -AY       Row Name 01/01/24 1356          Vital Signs    Pre Systolic BP Rehab 112  -AY     Pre Treatment Diastolic BP 74  -AY     Post Systolic BP Rehab 148  -AY     Post Treatment Diastolic BP 92  -AY     Pretreatment Heart Rate (beats/min) 78  -AY     Posttreatment Heart Rate (beats/min) 79  -AY     Pre SpO2 (%) 98  -AY     O2 Delivery Pre Treatment nasal cannula  -AY     O2 Delivery Intra Treatment nasal cannula  -AY     Post SpO2 (%) 89  -AY     O2 Delivery Post Treatment nasal cannula  -AY     Pre Patient Position Supine  -AY     Intra Patient Position Standing  -AY     Post Patient Position Supine  -AY       Row Name 01/01/24 1356          Positioning and Restraints    Pre-Treatment Position in bed  -AY     Post Treatment Position bed  -AY     In Bed notified nsg;supine;fowlers;sitting;side rails up x3;LUE elevated;legs elevated;call light within reach;encouraged to call for assist;with nsg;exit alarm on  -AY     Restraints released:;reapplied:;notified nsg:;soft limb  -AY               User Key  (r) = Recorded By, (t) = Taken By, (c) = Cosigned By      Initials Name Provider Type    AY Fabby Villarreal, PT Physical Therapist                   Outcome Measures       Row Name 01/01/24 1401          How much help from another person do you currently need...    Turning from your back to your side while in flat bed without using bedrails? 1  -AY     Moving from lying on back to sitting on the side of a flat bed without bedrails? 1  -AY      Moving to and from a bed to a chair (including a wheelchair)? 1  -AY     Standing up from a chair using your arms (e.g., wheelchair, bedside chair)? 1  -AY     Climbing 3-5 steps with a railing? 1  -AY     To walk in hospital room? 1  -AY     AM-PAC 6 Clicks Score (PT) 6  -AY     Highest Level of Mobility Goal 2 --> Bed activities/dependent transfer  -AY       Row Name 01/01/24 1401          Functional Assessment    Outcome Measure Options AM-PAC 6 Clicks Basic Mobility (PT)  -AY               User Key  (r) = Recorded By, (t) = Taken By, (c) = Cosigned By      Initials Name Provider Type    AY Fabby Villarreal, PT Physical Therapist                                 Physical Therapy Education       Title: PT OT SLP Therapies (In Progress)       Topic: Physical Therapy (In Progress)       Point: Mobility training (In Progress)       Learning Progress Summary             Patient Acceptance, E,TB, NR by AY at 1/1/2024 1402    Acceptance, E, NR,VU by ZS at 12/29/2023 0752    Acceptance, E, NR by KG at 12/28/2023 0845   Family Acceptance, E, NR,VU by ZS at 12/29/2023 0752                         Point: Home exercise program (In Progress)       Learning Progress Summary             Patient Acceptance, E,TB, NR by AY at 1/1/2024 1402                         Point: Body mechanics (In Progress)       Learning Progress Summary             Patient Acceptance, E,TB, NR by AY at 1/1/2024 1402    Acceptance, E, NR by KG at 12/28/2023 0845                         Point: Precautions (In Progress)       Learning Progress Summary             Patient Acceptance, E,TB, NR by AY at 1/1/2024 1402    Acceptance, E, NR by KG at 12/28/2023 0845                                         User Key       Initials Effective Dates Name Provider Type Discipline    KG 05/22/20 -  Katina Anderson PT Physical Therapist PT    ZS 02/13/23 -  Jerald Spencer, RN Registered Nurse Nurse    AY 11/10/20 -  Fabby Villarreal, EUGENE Physical Therapist PT                   PT Recommendation and Plan     Plan of Care Reviewed With: patient  Progress: no change  Outcome Evaluation: Pt tolerated sitting EOB with max A/total A, limited by impaired cognition with decreased command following noted. Cont to progress as able. d/c rec for IRF.     Time Calculation:         PT Charges       Row Name 01/01/24 1402             Time Calculation    Start Time 1000  -AY      PT Received On 01/01/24  -AY         Timed Charges    32537 - PT Therapeutic Activity Minutes 10  -AY         Total Minutes    Timed Charges Total Minutes 10  -AY       Total Minutes 10  -AY                User Key  (r) = Recorded By, (t) = Taken By, (c) = Cosigned By      Initials Name Provider Type    AY Fabby Villarrela, EUGENE Physical Therapist                  Therapy Charges for Today       Code Description Service Date Service Provider Modifiers Qty    60367833134 HC PT THERAPEUTIC ACT EA 15 MIN 1/1/2024 Fabby Villarreal, PT GP 1            PT G-Codes  Outcome Measure Options: AM-PAC 6 Clicks Basic Mobility (PT)  AM-PAC 6 Clicks Score (PT): 6  AM-PAC 6 Clicks Score (OT): 11  Modified Elizabeth Scale: 4 - Moderately severe disability.  Unable to walk without assistance, and unable to attend to own bodily needs without assistance.  PT Discharge Summary  Anticipated Discharge Disposition (PT): inpatient rehabilitation facility    Fabby Villarreal PT  1/1/2024

## 2024-01-01 NOTE — PROGRESS NOTES
Nutrition Services    Patient Name:  Chano Rees  YOB: 1970  MRN: [id number]  Admit Date:  2023      EN f/u    EN joselo advancing to goal.   Cont monitor lytes. Na today 138 note K+ w note slt hemolyzed sample, no new Mg     Est Needs  from   1650 Kcal/da  159 g PRO/da     Current Nutrition Prescription      EN: Peptamen Intense VHP  Goal Rate:80 ml/hr  Water Flushes: 30 ml ev 2 hr  Modular: Prosource -no carb 1/day  Route: ND  Tube: Small bore     At goal over: 20Hrs/day     Rx will supply:   Goal Volume 1600  mL/day       Flush Volume 300 mL/day       Energy 1660 Kcal/day 101 % Est Need   Protein 162 g/day 102 % Est Need   Fiber 6.4 g/day       Water in  EN 1344 mL       Total Water 1644 mL       Meet DRDALE Yes            --------------------------------------------------------------------------  Product/Rate verified at bedside: Yes  Infusing Rate at time of visit: EN 40 ml/hr Water 30 ml ev 2 hr     Average Delivery from [place] w Prosource  Volume 273  mL/day 17   % Goal Vol.   Flush Volume 180  mL/day       Energy 333  Kcal/day 20  % Est Need   Protein 40  g/day 25  % Est Need   Fiber 1  g/day       Water in    mL       Total Water 409  mL       Meet DRI N/A               Electronically signed by:  Agatha Spencer RD  23 20:14 EST

## 2024-01-01 NOTE — PLAN OF CARE
Goal Outcome Evaluation:  Plan of Care Reviewed With: patient        Progress: no change  Outcome Evaluation: Pt tolerated sitting EOB with max A/total A, limited by impaired cognition with decreased command following noted. Cont to progress as able. d/c rec for IRF.      Anticipated Discharge Disposition (PT): inpatient rehabilitation facility

## 2024-01-01 NOTE — PLAN OF CARE
Problem: Adult Inpatient Plan of Care  Goal: Absence of Hospital-Acquired Illness or Injury  Intervention: Prevent Skin Injury  Recent Flowsheet Documentation  Taken 1/1/2024 0600 by Leticia Bower RN  Body Position:   turned   right   upper extremity elevated   weight shifting  Skin Protection:   adhesive use limited   incontinence pads utilized   transparent dressing maintained   tubing/devices free from skin contact  Taken 1/1/2024 0400 by Leticia Bower RN  Body Position:   turned   left   upper extremity elevated   weight shifting  Skin Protection:   adhesive use limited   incontinence pads utilized   transparent dressing maintained   tubing/devices free from skin contact  Taken 1/1/2024 0200 by Leticia Bower RN  Body Position:   neutral head position   neutral body alignment   upper extremity elevated   weight shifting  Skin Protection:   adhesive use limited   tubing/devices free from skin contact   transparent dressing maintained  Taken 1/1/2024 0000 by Leticia Bower RN  Body Position:   turned   left   upper extremity elevated   weight shifting  Skin Protection:   adhesive use limited   incontinence pads utilized   transparent dressing maintained   tubing/devices free from skin contact  Taken 12/31/2023 2200 by Leticia Bower RN  Body Position:   turned   right   upper extremity elevated   weight shifting  Skin Protection:   adhesive use limited   incontinence pads utilized   transparent dressing maintained   tubing/devices free from skin contact  Taken 12/31/2023 2000 by Leticia Bower RN  Body Position: weight shifting  Skin Protection:   adhesive use limited   incontinence pads utilized   transparent dressing maintained   tubing/devices free from skin contact     Problem: Adult Inpatient Plan of Care  Goal: Optimal Comfort and Wellbeing  Intervention: Monitor Pain and Promote Comfort  Recent Flowsheet Documentation  Taken 1/1/2024 0600 by Leticia Bower RN  Pain Management  Interventions:   care clustered   position adjusted   see MAR   quiet environment facilitated  Taken 1/1/2024 0400 by Leticia Bower, RN  Pain Management Interventions:   care clustered   position adjusted   quiet environment facilitated  Taken 12/31/2023 2200 by Leticia Bower, RN  Pain Management Interventions:   care clustered   position adjusted  Taken 12/31/2023 2000 by Leticia Bower, RN  Pain Management Interventions:   care clustered   see MAR   quiet environment facilitated   Goal Outcome Evaluation:

## 2024-01-01 NOTE — THERAPY TREATMENT NOTE
Patient Name: Chano Rees  : 1970    MRN: [id number]                              Today's Date: 2024       Admit Date: 2023    Visit Dx:     ICD-10-CM ICD-9-CM   1. Intracranial hemorrhage  I62.9 432.9   2. Uncontrolled hypertension  I10 401.9   3. Personal history of noncompliance with medical treatment  Z91.199 V15.81   4. Cognitive communication deficit  R41.841 799.52     Patient Active Problem List   Diagnosis    Rt Thalamic ICH    Hypertension    Non-compliance    History of alcohol abuse     Past Medical History:   Diagnosis Date    Hypertension      Past Surgical History:   Procedure Laterality Date    ANKLE SURGERY Left       General Information       Row Name 24 1359          OT Time and Intention    Document Type therapy note (daily note)  -CS     Mode of Treatment occupational therapy;co-treatment  -CS       Row Name 24 1358          General Information    Existing Precautions/Restrictions fall;other (see comments)  L neglect, L sided weakness (UE>LE), L visual deficit  -CS     Barriers to Rehab medically complex;visual deficit;cognitive status  -CS       Row Name 24 1351          Cognition    Orientation Status (Cognition) unable/difficult to assess  -CS       Row Name 24 1354          Safety Issues, Functional Mobility    Impairments Affecting Function (Mobility) balance;cognition;coordination;endurance/activity tolerance;motor control;motor planning;postural/trunk control;range of motion (ROM);strength;visual/perceptual  -CS     Cognitive Impairments, Mobility Safety/Performance attention;insight into deficits/self-awareness;sequencing abilities  -CS               User Key  (r) = Recorded By, (t) = Taken By, (c) = Cosigned By      Initials Name Provider Type    CS Halley Ceron OT Occupational Therapist                     Mobility/ADL's       Row Name 24 1401          Bed Mobility    Scooting/Bridging [place] (Bed Mobility) dependent (less  than 25% patient effort);2 person assist;verbal cues  -CS     Supine-Sit [place] (Bed Mobility) dependent (less than 25% patient effort);2 person assist;verbal cues  -CS     Sit-Supine [place] (Bed Mobility) dependent (less than 25% patient effort);2 person assist;verbal cues  -CS     Assistive Device (Bed Mobility) draw sheet;bed rails;head of bed elevated  -CS       Row Name 01/01/24 1401          Activities of Daily Living    BADL Assessment/Intervention lower body dressing;grooming;toileting  -CS       Row Name 01/01/24 1401          Lower Body Dressing Assessment/Training    [place] Level (Lower Body Dressing) don;socks;dependent (less than 25% patient effort)  -CS     Position (Lower Body Dressing) supine  -CS       Row Name 01/01/24 1401          Grooming Assessment/Training    [place] Level (Grooming) wash face, hands;dependent (less than 25% patient effort)  -CS     Position (Grooming) supine  -CS       Row Name 01/01/24 1401          Toileting Assessment/Training    [place] Level (Toileting) adjust/manage clothing;change pad/brief;perform perineal hygiene;dependent (less than 25% patient effort)  -CS     Position (Toileting) supine  -CS               User Key  (r) = Recorded By, (t) = Taken By, (c) = Cosigned By      Initials Name Provider Type    Halley Carlos OT Occupational Therapist                   Obj/Interventions       Row Name 01/01/24 1407          Balance    Balance Assessment sitting static balance;sitting dynamic balance  -CS     Static Sitting Balance maximum assist  -CS     Dynamic Sitting Balance dependent  -CS     Position, Sitting Balance sitting edge of bed  -CS               User Key  (r) = Recorded By, (t) = Taken By, (c) = Cosigned By      Initials Name Provider Type    Halley Carlos OT Occupational Therapist                   Goals/Plan    No documentation.                  Clinical Impression       Row Name 01/01/24 1407          Pain Assessment     Pretreatment Pain Rating 0/10 - no pain  -CS     Posttreatment Pain Rating 0/10 - no pain  -CS       Row Name 01/01/24 1407          Plan of Care Review    Plan of Care Reviewed With patient  -CS     Outcome Evaluation Pt required increased assist for all ADLs, balance, and bed mobility this date w/ no command following. Recommend cont skilled IPOT POC to promote return to PLOF. Recommend pt DC to IP rehab, however will monitor progress closely.  -CS       Row Name 01/01/24 1407          Therapy Assessment/Plan (OT)    Patient/Family Therapy Goal Statement (OT) Return to PLOF  -CS       Row Name 01/01/24 1407          Therapy Plan Review/Discharge Plan (OT)    Anticipated Discharge Disposition (OT) inpatient rehabilitation facility  -CS       Row Name 01/01/24 1407          Vital Signs    Pre Systolic BP Rehab 112  -CS     Pre Treatment Diastolic BP 74  -CS     Post Systolic BP Rehab 148  -CS     Post Treatment Diastolic BP 92  -CS     Pretreatment Heart Rate (beats/min) 78  -CS     Posttreatment Heart Rate (beats/min) 79  -CS     Pre SpO2 (%) 98  -CS     O2 Delivery Pre Treatment nasal cannula  -CS     Post SpO2 (%) 89  -CS     O2 Delivery Post Treatment nasal cannula  -CS     Pre Patient Position Supine  -CS     Intra Patient Position Sitting  -CS     Post Patient Position Supine  -CS       Row Name 01/01/24 1403          Positioning and Restraints    Pre-Treatment Position in bed  -CS     Post Treatment Position bed  -CS     In Bed notified nsg;fowlers;call light within reach;encouraged to call for assist;exit alarm on;RUE elevated;LUE elevated;legs elevated;heels elevated;with nsg;patient within staff view  -CS     Restraints released:;reapplied:;notified nsg:;soft limb  -CS               User Key  (r) = Recorded By, (t) = Taken By, (c) = Cosigned By      Initials Name Provider Type    CS Halley Ceron, OT Occupational Therapist                   Outcome Measures       Row Name 01/01/24 1409          How  much help from another is currently needed...    Putting on and taking off regular lower body clothing? 1  -CS     Bathing (including washing, rinsing, and drying) 1  -CS     Toileting (which includes using toilet bed pan or urinal) 1  -CS     Putting on and taking off regular upper body clothing 1  -CS     Taking care of personal grooming (such as brushing teeth) 1  -CS     Eating meals 1  -CS     AM-PAC 6 Clicks Score (OT) 6  -CS       Row Name 01/01/24 1401          How much help from another person do you currently need...    Turning from your back to your side while in flat bed without using bedrails? 1  -AY     Moving from lying on back to sitting on the side of a flat bed without bedrails? 1  -AY     Moving to and from a bed to a chair (including a wheelchair)? 1  -AY     Standing up from a chair using your arms (e.g., wheelchair, bedside chair)? 1  -AY     Climbing 3-5 steps with a railing? 1  -AY     To walk in hospital room? 1  -AY     AM-PAC 6 Clicks Score (PT) 6  -AY     Highest Level of Mobility Goal 2 --> Bed activities/dependent transfer  -AY       Row Name 01/01/24 1409          Modified Helix Scale    Modified Helix Scale 5 - Severe disability.  Bedridden, incontinent, and requiring constant nursing care and attention.  -CS       Row Name 01/01/24 1409 01/01/24 1401       Functional Assessment    Outcome Measure Options AM-PAC 6 Clicks Daily Activity (OT)  -CS AM-PAC 6 Clicks Basic Mobility (PT)  -AY              User Key  (r) = Recorded By, (t) = Taken By, (c) = Cosigned By      Initials Name Provider Type    Halley Carlos OT Occupational Therapist    Fabby Quiroga PT Physical Therapist                    Occupational Therapy Education       Title: PT OT SLP Therapies (In Progress)       Topic: Occupational Therapy (In Progress)       Point: ADL training (In Progress)       Description:   Instruct learner(s) on proper safety adaptation and remediation techniques during self care or  transfers.   Instruct in proper use of assistive devices.                  Learning Progress Summary             Patient Acceptance, E, NR by  at 1/1/2024 1409    Acceptance, E, VU,NR by AN at 12/28/2023 1531   Family Acceptance, E, VU,NR by AN at 12/28/2023 1531                         Point: Home exercise program (In Progress)       Description:   Instruct learner(s) on appropriate technique for monitoring, assisting and/or progressing therapeutic exercises/activities.                  Learning Progress Summary             Patient Acceptance, E, NR by CS at 1/1/2024 1409                         Point: Precautions (In Progress)       Description:   Instruct learner(s) on prescribed precautions during self-care and functional transfers.                  Learning Progress Summary             Patient Acceptance, E, NR by  at 1/1/2024 1409    Acceptance, E, VU,NR by AN at 12/28/2023 1531   Family Acceptance, E, VU,NR by AN at 12/28/2023 1531                         Point: Body mechanics (In Progress)       Description:   Instruct learner(s) on proper positioning and spine alignment during self-care, functional mobility activities and/or exercises.                  Learning Progress Summary             Patient Acceptance, E, NR by  at 1/1/2024 1409    Acceptance, E, VU,NR by AN at 12/28/2023 1531   Family Acceptance, E, VU,NR by AN at 12/28/2023 1531                                         User Key       Initials Effective Dates Name Provider Type Discipline     09/02/21 -  Halley Ceron, OT Occupational Therapist OT    BREEZY 09/21/21 -  Princess Gtz OT Occupational Therapist OT                  OT Recommendation and Plan     Plan of Care Review  Plan of Care Reviewed With: patient  Outcome Evaluation: Pt required increased assist for all ADLs, balance, and bed mobility this date w/ no command following. Recommend cont skilled IPOT POC to promote return to PLOF. Recommend pt DC to IP rehab, however will  monitor progress closely.     Time Calculation:         Time Calculation- OT       Row Name 01/01/24 1410             Time Calculation- OT    OT Start Time 1010  -CS      OT Received On 01/01/24  -CS      OT Goal Re-Cert Due Date 01/07/24  -CS         Timed Charges    22207 - OT Therapeutic Activity Minutes 5  -CS      44761 - OT Self Care/Mgmt Minutes 5  -CS         Total Minutes    Timed Charges Total Minutes 10  -CS       Total Minutes 10  -CS                User Key  (r) = Recorded By, (t) = Taken By, (c) = Cosigned By      Initials Name Provider Type    CS Halley Ceron OT Occupational Therapist                  Therapy Charges for Today       Code Description Service Date Service Provider Modifiers Qty    24043693380 HC OT THERAPEUTIC ACT EA 15 MIN 1/1/2024 Halley Ceron OT GO 1                 Halley Ceron OT  1/1/2024

## 2024-01-01 NOTE — PLAN OF CARE
Goal Outcome Evaluation:  Plan of Care Reviewed With: patient           Outcome Evaluation: Pt required increased assist for all ADLs, balance, and bed mobility this date w/ no command following. Recommend cont skilled IPOT POC to promote return to PLOF. Recommend pt DC to IP rehab, however will monitor progress closely.      Anticipated Discharge Disposition (OT): inpatient rehabilitation facility

## 2024-01-01 NOTE — PROGRESS NOTES
"INTENSIVIST NOTE     Hospital Day: 5    Mr. Chano Rees, 53 y.o. male is followed for:   ICH       SUBJECTIVE     Interval history:    Temperature curve down with maximum temperature 100.2.  However, more hypoxic and placed on BiPAP overnight.  Has been tachypneic for several days.  Chest x-ray fairly unremarkable.  Fluid balance relatively even.    The patient's relevant past medical, surgical and social history were reviewed and updated in Epic as appropriate.       OBJECTIVE     Vital Sign Min/Max for last 24 hours  Temp  Min: 98.4 °F (36.9 °C)  Max: 100.2 °F (37.9 °C)   BP  Min: 105/62  Max: 207/94   Pulse  Min: 77  Max: 122   Resp  Min: 21  Max: 40   SpO2  Min: 89 %  Max: 100 %   No data recorded   Weight  Min: 127 kg (279 lb 12.2 oz)  Max: 127 kg (279 lb 12.2 oz)      Intake/Output Summary (Last 24 hours) at 1/1/2024 1341  Last data filed at 1/1/2024 0600  Gross per 24 hour   Intake 2458.36 ml   Output 2050 ml   Net 408.36 ml      Flowsheet Rows      Flowsheet Row First Filed Value   Admission Height 177.8 cm (70\") Documented at 12/27/2023 2152   Admission Weight 125 kg (275 lb) Documented at 12/27/2023 2152               12/30/23  0400 12/31/23  0500 01/01/24  0400   Weight: 127 kg (280 lb 3.3 oz) 130 kg (286 lb 6 oz) 127 kg (279 lb 12.2 oz)            Objective:  General Appearance:  In no acute distress.    Vital signs: (most recent): Blood pressure 143/96, pulse 96, temperature 98.4 °F (36.9 °C), temperature source Axillary, resp. rate 23, height 180.3 cm (71\"), weight 127 kg (279 lb 12.2 oz), SpO2 (!) 89%.    HEENT: Normal HEENT exam.  (NG tube  BiPAP)    Lungs:  Tachypnea.  There are rhonchi.  No rales or wheezes.    Heart: Normal rate.  Regular rhythm.  S1 normal and S2 normal.  No murmur, gallop or friction rub.   Chest: Symmetric chest wall expansion.   Abdomen: Abdomen is soft and non-distended.  Bowel sounds are normal.   There is no abdominal tenderness.   There is no mass. There is no " splenomegaly. There is no hepatomegaly.   Extremities: There is no deformity or dependent edema.    Pupils:  Pupils are equal, round, and reactive to light.    Skin:  Warm and dry.            Interval:  (handoff)  1a. Level of Consciousness: 2-->Not alert, requires repeated stimulation to attend, or is obtunded and requires strong or painful stimulation to make movements (not stereotyped)  1b. LOC Questions: 2-->Answers neither question correctly  1c. LOC Commands: 2-->Performs neither task correctly  2. Best Gaze: 1-->Partial gaze palsy, gaze is abnormal in one or both eyes, but forced deviation or total gaze paresis is not present  3. Visual: 0-->No visual loss  4. Facial Palsy: 1-->Minor paralysis (flattened nasolabial fold, asymmetry on smiling)  5a. Motor Arm, Left: 1-->Drift, limb holds 90 (or 45) degrees, but drifts down before full 10 seconds, does not hit bed or other support  5b. Motor Arm, Right: 0-->No drift, limb holds 90 (or 45) degrees for full 10 secs  6a. Motor Leg, Left: 2-->Some effort against gravity, leg falls to bed by 5 secs, but has some effort against gravity  6b. Motor Leg, Right: 0-->No drift, leg holds 30 degree position for full 5 secs  7. Limb Ataxia: 0-->Absent  8. Sensory: 1-->Mild-to-moderate sensory loss, patient feels pinprick is less sharp or is dull on the affected side, or there is a loss of superficial pain with pinprick, but patient is aware of being touched  9. Best Language: 1-->Mild-to-moderate aphasia, some obvious loss of fluency or facility of comprehension, without significant limitation on ideas expressed or form of expression. Reduction of speech and/or comprehension, however, makes conversation. . . (see row details)  10. Dysarthria: 1-->Mild-to-moderate dysarthria, patient slurs at least some words and, at worst, can be understood with some difficulty  11. Extinction and Inattention (formerly Neglect): 1-->Visual, tactile, auditory, spatial, or personal inattention  or extinction to bilateral simultaneous stimulation in one of the sensory modalities    Total (NIH Stroke Scale): 15     I reviewed the patient's new clinical results.  I reviewed the patient's new imaging results/reports including actual images and agree with reports.    XR Chest 1 View    Result Date: 1/1/2024  Impression: No new chest disease. Electronically Signed: Solomon Carbone MD  1/1/2024 8:47 AM EST  Workstation ID: [id number]    XR Chest 1 View    Result Date: 12/31/2023  Impression: Placement of enteric feeding tube which courses into the stomach with the tip beyond the field-of-view. Vague bilateral interstitial opacities suspicious for atypical/viral infection. Electronically Signed: Alex Nieto MD  12/31/2023 8:05 AM EST  Workstation ID: [id number]    CT Head Without Contrast    Result Date: 12/31/2023  Impression: 1.Stable right basal ganglia intraparenchymal hemorrhage extending into the right lateral ventricle. 2.Stable mild mass effect with 3 mm leftward shift of midline. No herniation or acute hydrocephalus. 3.No new hemorrhage. 4.Mild chronic small vessel ischemic change. Electronically Signed: Tal Meier MD  12/31/2023 6:00 AM EST  Workstation ID: [id number]      INFUSIONS  niCARdipine, 5-15 mg/hr, Last Rate: 7.5 mg/hr (01/01/24 0635)        Results from last 7 days   Lab Units 01/01/24  0459 12/31/23  0405 12/30/23  0354   WBC 10*3/mm3 12.08* 17.10* 14.97*   HEMOGLOBIN g/dL 14.5 14.7 14.9   HEMATOCRIT % 44.1 45.6 44.6   PLATELETS 10*3/mm3 232 225 256     Results from last 7 days   Lab Units 01/01/24  0459 12/31/23  0405 12/30/23  0457 12/28/23  0612 12/27/23  2358   SODIUM mmol/L 139 138 139   < > 139  139   POTASSIUM mmol/L 3.4* 4.2 4.2   < > 4.0  4.0   CHLORIDE mmol/L 107 106 106   < > 104  104   CO2 mmol/L 20.0* 19.0* 21.0*   < > 22.0  22.0   BUN mg/dL 28* 27* 25*   < > 12  12   GLUCOSE mg/dL 97 98 109*   < > 133*  133*   CREATININE mg/dL 1.18 1.15 1.40*   < > 1.28*  1.28*   MAGNESIUM  mg/dL 2.6  --  1.9  --  2.3   CALCIUM mg/dL 8.6 8.7 8.8   < > 9.8  9.8   ALBUMIN g/dL 3.2*  --  3.6  --  4.3    < > = values in this interval not displayed.         Results from last 7 days   Lab Units 01/01/24  0530 12/29/23  2042   PH, ARTERIAL pH units 7.425 7.545*   PCO2, ARTERIAL mm Hg 32.7* 25.3*   PO2 ART mm Hg 80.0* 59.3*   FIO2 % 50 28       Diet, Tube Feeding Tube Feeding Formula: Peptamen Intense VHP (Peptide Based, Very High Protein)   /h  Patient doesn't have any tube feeding modular orders    Mechanical Ventilator:   Settings: Observed:                                                I reviewed the patient's medications.    Assessment & Plan   ASSESSMENT/PLAN     Active Hospital Problems    Diagnosis     **Rt Thalamic ICH     Hypertension     Non-compliance     History of alcohol abuse        53-year-old male with a past medical history significant for hypertension and poor medical compliance.  He presented to the ER on 12/27 with sudden onset of left-sided weakness and headache.  Blood pressure in /139 initially.  CT of the head revealed a 3 cm right thalamic ICH.  CTA was unremarkable and was felt this was a hypertensive bleed.  He was admitted to the ICU for blood pressure control.  He has a history of alcoholism but reportedly has not been drinking recently    Since arrival in the ICU his neurologic status has tended to wax and wane.  His family has insisted that he has not drank any alcohol recently.  He is getting as needed benzodiazepines but he is not on scheduled benzodiazepines for this reason.  Trying to minimize sedation.      He has been tachypneic for several days but I have attributed this to his neurologic process as radiographically he is doing well from a pulmonary standpoint.  Overnight placed on BiPAP but will try to discontinue this today.  He may be having trouble with secretions unfortunately.  He has had fever and has been placed empirically on Zosyn but it is unclear  whether this is central or related to an infectious process but there is certainly nothing obvious in regards to the latter.      Plan:    Try off BiPAP as noted above  Goal SBP less than 140 mmHg  Cardene drip as needed  Oral Lopressor and lisinopril.  Have increased lisinopril  Nicotine patch  Thiamine and folate  As needed benzodiazepines per CIWA scale.  Trying to minimize sedation.  Zosyn empirically  Monitor fever curve.  Central versus infectious  Continue tube feeds  Additional Lasix     I discussed the patient's findings and my recommendations with patient, family, and nursing staff     Plan of care and goals reviewed with multidisciplinary team at daily rounds.    High level of risk due to:  Acute illness with threat to life or bodily function.    Edvin Child MD  Pulmonary and Critical Care Medicine  01/01/24 13:41 EST

## 2024-01-02 ENCOUNTER — APPOINTMENT (OUTPATIENT)
Dept: GENERAL RADIOLOGY | Facility: HOSPITAL | Age: 54
End: 2024-01-02
Payer: COMMERCIAL

## 2024-01-02 LAB
ANION GAP SERPL CALCULATED.3IONS-SCNC: 15 MMOL/L (ref 5–15)
BASOPHILS # BLD AUTO: 0.07 10*3/MM3 (ref 0–0.2)
BASOPHILS NFR BLD AUTO: 0.6 % (ref 0–1.5)
BUN SERPL-MCNC: 30 MG/DL (ref 6–20)
BUN/CREAT SERPL: 26.1 (ref 7–25)
CALCIUM SPEC-SCNC: 9.1 MG/DL (ref 8.6–10.5)
CHLORIDE SERPL-SCNC: 107 MMOL/L (ref 98–107)
CO2 SERPL-SCNC: 19 MMOL/L (ref 22–29)
CREAT SERPL-MCNC: 1.15 MG/DL (ref 0.76–1.27)
DEPRECATED RDW RBC AUTO: 49.3 FL (ref 37–54)
EGFRCR SERPLBLD CKD-EPI 2021: 76.1 ML/MIN/1.73
EOSINOPHIL # BLD AUTO: 0.4 10*3/MM3 (ref 0–0.4)
EOSINOPHIL NFR BLD AUTO: 3.5 % (ref 0.3–6.2)
ERYTHROCYTE [DISTWIDTH] IN BLOOD BY AUTOMATED COUNT: 14.7 % (ref 12.3–15.4)
GLUCOSE BLDC GLUCOMTR-MCNC: 93 MG/DL (ref 70–130)
GLUCOSE SERPL-MCNC: 103 MG/DL (ref 65–99)
HCT VFR BLD AUTO: 43.7 % (ref 37.5–51)
HGB BLD-MCNC: 14.7 G/DL (ref 13–17.7)
IMM GRANULOCYTES # BLD AUTO: 0.39 10*3/MM3 (ref 0–0.05)
IMM GRANULOCYTES NFR BLD AUTO: 3.4 % (ref 0–0.5)
LARGE PLATELETS: NORMAL
LYMPHOCYTES # BLD AUTO: 1.56 10*3/MM3 (ref 0.7–3.1)
LYMPHOCYTES NFR BLD AUTO: 13.8 % (ref 19.6–45.3)
MCH RBC QN AUTO: 30.8 PG (ref 26.6–33)
MCHC RBC AUTO-ENTMCNC: 33.6 G/DL (ref 31.5–35.7)
MCV RBC AUTO: 91.6 FL (ref 79–97)
MONOCYTES # BLD AUTO: 1.8 10*3/MM3 (ref 0.1–0.9)
MONOCYTES NFR BLD AUTO: 15.9 % (ref 5–12)
NEUTROPHILS NFR BLD AUTO: 62.8 % (ref 42.7–76)
NEUTROPHILS NFR BLD AUTO: 7.11 10*3/MM3 (ref 1.7–7)
NRBC BLD AUTO-RTO: 0 /100 WBC (ref 0–0.2)
PLATELET # BLD AUTO: 201 10*3/MM3 (ref 140–450)
PMV BLD AUTO: 11.8 FL (ref 6–12)
POTASSIUM SERPL-SCNC: 4.6 MMOL/L (ref 3.5–5.2)
RBC # BLD AUTO: 4.77 10*6/MM3 (ref 4.14–5.8)
RBC MORPH BLD: NORMAL
SODIUM SERPL-SCNC: 141 MMOL/L (ref 136–145)
WBC MORPH BLD: NORMAL
WBC NRBC COR # BLD AUTO: 11.33 10*3/MM3 (ref 3.4–10.8)

## 2024-01-02 PROCEDURE — 25010000002 PIPERACILLIN SOD-TAZOBACTAM PER 1 G: Performed by: INTERNAL MEDICINE

## 2024-01-02 PROCEDURE — 25010000002 FUROSEMIDE PER 20 MG: Performed by: INTERNAL MEDICINE

## 2024-01-02 PROCEDURE — 25810000003 SODIUM CHLORIDE 0.9 % SOLUTION 250 ML FLEX CONT: Performed by: INTERNAL MEDICINE

## 2024-01-02 PROCEDURE — 85025 COMPLETE CBC W/AUTO DIFF WBC: CPT | Performed by: INTERNAL MEDICINE

## 2024-01-02 PROCEDURE — 25010000002 NICARDIPINE 2.5 MG/ML SOLUTION 10 ML VIAL: Performed by: INTERNAL MEDICINE

## 2024-01-02 PROCEDURE — 82948 REAGENT STRIP/BLOOD GLUCOSE: CPT

## 2024-01-02 PROCEDURE — 85007 BL SMEAR W/DIFF WBC COUNT: CPT | Performed by: INTERNAL MEDICINE

## 2024-01-02 PROCEDURE — 99233 SBSQ HOSP IP/OBS HIGH 50: CPT | Performed by: INTERNAL MEDICINE

## 2024-01-02 PROCEDURE — 94660 CPAP INITIATION&MGMT: CPT

## 2024-01-02 PROCEDURE — 25010000002 MIDAZOLAM PER 1 MG: Performed by: INTERNAL MEDICINE

## 2024-01-02 PROCEDURE — 94761 N-INVAS EAR/PLS OXIMETRY MLT: CPT

## 2024-01-02 PROCEDURE — 71045 X-RAY EXAM CHEST 1 VIEW: CPT

## 2024-01-02 PROCEDURE — 94799 UNLISTED PULMONARY SVC/PX: CPT

## 2024-01-02 PROCEDURE — 80048 BASIC METABOLIC PNL TOTAL CA: CPT | Performed by: INTERNAL MEDICINE

## 2024-01-02 RX ORDER — FUROSEMIDE 10 MG/ML
40 INJECTION INTRAMUSCULAR; INTRAVENOUS ONCE
Status: COMPLETED | OUTPATIENT
Start: 2024-01-02 | End: 2024-01-02

## 2024-01-02 RX ORDER — AMINO ACIDS/PROTEIN HYDROLYS 11G-40/45
30 LIQUID IN PACKET (ML) ORAL 2 TIMES DAILY
Status: DISCONTINUED | OUTPATIENT
Start: 2024-01-02 | End: 2024-01-03

## 2024-01-02 RX ORDER — DEXMEDETOMIDINE HYDROCHLORIDE 4 UG/ML
.2-1.5 INJECTION, SOLUTION INTRAVENOUS
Status: DISCONTINUED | OUTPATIENT
Start: 2024-01-02 | End: 2024-01-05

## 2024-01-02 RX ADMIN — Medication 10 ML: at 08:52

## 2024-01-02 RX ADMIN — THIAMINE HCL TAB 100 MG 100 MG: 100 TAB at 16:04

## 2024-01-02 RX ADMIN — DEXMEDETOMIDINE HYDROCHLORIDE 0.3 MCG/KG/HR: 400 INJECTION, SOLUTION INTRAVENOUS at 22:25

## 2024-01-02 RX ADMIN — DEXMEDETOMIDINE HYDROCHLORIDE 0.4 MCG/KG/HR: 400 INJECTION, SOLUTION INTRAVENOUS at 11:21

## 2024-01-02 RX ADMIN — METOPROLOL TARTRATE 50 MG: 50 TABLET ORAL at 08:51

## 2024-01-02 RX ADMIN — MIDAZOLAM HYDROCHLORIDE 2 MG: 1 INJECTION, SOLUTION INTRAMUSCULAR; INTRAVENOUS at 00:36

## 2024-01-02 RX ADMIN — Medication 30 ML: at 08:52

## 2024-01-02 RX ADMIN — DEXMEDETOMIDINE HYDROCHLORIDE 0.2 MCG/KG/HR: 400 INJECTION, SOLUTION INTRAVENOUS at 01:17

## 2024-01-02 RX ADMIN — LISINOPRIL 20 MG: 20 TABLET ORAL at 08:51

## 2024-01-02 RX ADMIN — Medication 30 ML: at 20:03

## 2024-01-02 RX ADMIN — PIPERACILLIN SODIUM AND TAZOBACTAM SODIUM 4.5 G: 4; .5 INJECTION, SOLUTION INTRAVENOUS at 13:30

## 2024-01-02 RX ADMIN — NICARDIPINE HYDROCHLORIDE 5 MG/HR: 25 INJECTION, SOLUTION INTRAVENOUS at 06:59

## 2024-01-02 RX ADMIN — Medication 10 ML: at 20:39

## 2024-01-02 RX ADMIN — Medication 1 PATCH: at 08:50

## 2024-01-02 RX ADMIN — PIPERACILLIN SODIUM AND TAZOBACTAM SODIUM 4.5 G: 4; .5 INJECTION, SOLUTION INTRAVENOUS at 22:10

## 2024-01-02 RX ADMIN — PIPERACILLIN SODIUM AND TAZOBACTAM SODIUM 4.5 G: 4; .5 INJECTION, SOLUTION INTRAVENOUS at 05:57

## 2024-01-02 RX ADMIN — Medication 1 TABLET: at 08:51

## 2024-01-02 RX ADMIN — METOPROLOL TARTRATE 50 MG: 50 TABLET ORAL at 20:00

## 2024-01-02 RX ADMIN — FUROSEMIDE 40 MG: 10 INJECTION, SOLUTION INTRAMUSCULAR; INTRAVENOUS at 12:13

## 2024-01-02 NOTE — CASE MANAGEMENT/SOCIAL WORK
Continued Stay Note  [facility]     Patient Name: Chano Rees  MRN: [id number]  Today's Date: 1/2/2024    Admit Date: 12/27/2023    Plan: IRF   Discharge Plan       Row Name 01/02/24 1037       Plan    Plan IRF    Patient/Family in Agreement with Plan yes    Plan Comments Spoke with patient's girlfriend, Amrita, to discuss disposition and guardianship.  Amrita reports that the patient is  and does have a daughter with whom he has been estranged from for at least 10 years and the patient's parents are both alive and live in [place].  Contacted  to assist with guardianship.  Patient will need referrals for acute rehab with his Medicaid insurance; family will decide on IRF location, either here in [place] or closer to the patient's home in [place].  CM will continue to follow and assist with discharge plan.                   Discharge Codes    No documentation.                       Dasha Adhikari RN

## 2024-01-02 NOTE — PAYOR COMM NOTE
"              Utilization Review  Phone [phone]  Fax [phone]    [facility]  [address] [place] [address]       Chano Winkler (53 y.o. Male)       Date of Birth   1970    Social Security Number       Address   [address]  [facility] [address]    Home Phone   [phone]    MRN   [id number]       Adventism   None    Marital Status   Single                            Admission Date   12/27/23    Admission Type   Emergency    Admitting Provider   Andrzej Ziegler MD    Attending Provider   Andrzej Ziegler MD    Department, Room/Bed   [facility] 2B ICU, N223/1       Discharge Date       Discharge Disposition       Discharge Destination                                 Attending Provider: Andrzej Ziegler MD    Allergies: Shellfish-derived Products    Isolation: None   Infection: None   Code Status: CPR    Ht: 180.3 cm (71\")   Wt: 123 kg (270 lb 1 oz)    Admission Cmt: None   Principal Problem: Rt Thalamic ICH [I61.9]                   Active Insurance as of 12/27/2023       Primary Coverage       Payor Plan Insurance Group Employer/Plan Group    [employer] [place] EXCHANGE AM[employer] EXCHANGE [id number]       Payor Plan Address Payor Plan Phone Number Payor Plan Fax Number Effective Dates    [address] [phone]  12/27/2023 - None Entered    [facility] [id number]         Subscriber Name Subscriber Birth Date Member ID       CHANO WINKLER 1970 [id number]                     Emergency Contacts        (Rel.) Home Phone Work Phone Mobile Phone    Dora Aviles (Daughter) -- -- [phone]    Amrita Jones (Significant Other) -- -- [phone]    Eboni Winkler (Mother) -- -- [phone]                 History & Physical        Andrzej Ziegler MD at 12/27/23 2206            CRITICAL CARE ADMISSION NOTE    Chief Complaint     ICH (intracerebral hemorrhage)    History of Present " Illness     Chano Rees is a 53 y.o. male with PMH HTN and noncompliance with antihypertensives who presented to the ED with stroke-like symptoms.  He was LKW at 1905 tonight when he developed sudden onset of left sided weakness, slight headache behind his right eye.  He was brought to the ED by EMS.  In the ED, he had a /139.  CTH showed 3 cm right thalalmic ICH.  CTA H/N were unremarkable.  Labs were unremarkable. He was given 20 mg Labetalol and started on Cardene to keep SBP < 140.  He was also premedicated with 125 mg Solumedrol, 20 mg Pepcid and 50 mg Benadryl due to shellfish allergy.     Time spent: 5 minutes  Electronically signed by AURELIANO Blanchard, 12/27/23, 10:15 PM EST.    The patient was evaluated in the intensive care unit  Awake and alert  Indicates that he is supposed to be on some type of blood pressure medicine but does not take it because it makes him feel lazy  Does not drink alcohol on a regular basis now but indicates that he previously drank a large amount of Venancio Becker on a daily basis    Problem List, Surgical History, Family, Social History, and ROS     Rt Thalamic ICH    Hypertension    Non-compliance    History of alcohol abuse     No past surgical history on file.    Allergies   Allergen Reactions    Shellfish-Derived Products Hives     No current facility-administered medications on file prior to encounter.     No current outpatient medications on file prior to encounter.     MEDICATION LIST AND ALLERGIES REVIEWED.    No family history on file.  Social History     Tobacco Use    Smoking status: Former     Types: Cigarettes    Smokeless tobacco: Current     Types: Chew   Substance Use Topics    Alcohol use: Not Currently     Social History     Social History Narrative    Not     No children    Works as an     Considers his girlfriend his medical surrogate     FAMILY AND SOCIAL HISTORY REVIEWED.    Review of Systems  AS ABOVE OR ALL  "OTHER SYSTEMS REVIEWED AND ARE NEGATIVE.    Physical Exam and Clinical Information   BP (!) 226/119   Pulse 93   Temp 98.4 °F (36.9 °C) (Oral)   Resp 20   Ht 177.8 cm (70\")   Wt 125 kg (275 lb)   SpO2 93%   BMI 39.46 kg/m²   Physical Exam:   GENERAL: Awake, no distress   HEENT: No adenopathy or thyromegaly   LUNGS: Clear without wheezes or rhonchi   HEART: Regular rate and rhythm without murmurs   GI: Soft, nontender   EXTREMITIES: No clubbing, cyanosis, or edema   NEURO/PSYCH: Awake, alert.  Follows commands.  NIH stroke scale as noted    Interval: baseline  1a. Level of Consciousness: 0-->Alert, keenly responsive  1b. LOC Questions: 0-->Answers both questions correctly  1c. LOC Commands: 0-->Performs both tasks correctly  2. Best Gaze: 2-->Forced deviation, or total gaze paresis not overcome by the oculocephalic maneuver  3. Visual: 1-->Partial hemianopia  4. Facial Palsy: 3-->Complete paralysis of one or both sides (absence of facial movement in the upper and lower face)  5a. Motor Arm, Left: 2-->Some effort against gravity, limb cannot get to or maintain (if cued) 90 (or 45) degrees, drifts down to bed, but has some effort against gravity  5b. Motor Arm, Right: 0-->No drift, limb holds 90 (or 45) degrees for full 10 secs  6a. Motor Leg, Left: 1-->Drift, leg falls by the end of the 5-sec period but does not hit bed  6b. Motor Leg, Right: 0-->No drift, leg holds 30 degree position for full 5 secs  7. Limb Ataxia: 2-->Present in two limbs  8. Sensory: 1-->Mild-to-moderate sensory loss, patient feels pinprick is less sharp or is dull on the affected side, or there is a loss of superficial pain with pinprick, but patient is aware of being touched  9. Best Language: 0-->No aphasia, normal  10. Dysarthria: 1-->Mild-to-moderate dysarthria, patient slurs at least some words and, at worst, can be understood with some difficulty  11. Extinction and Inattention (formerly Neglect): 2-->Profound " "lamberto-inattention/extinction more than 1 modality    Total (NIH Stroke Scale): 15      Results from last 7 days   Lab Units 23   WBC 10*3/mm3 9.71  --    HEMOGLOBIN g/dL 16.3  --    HEMOGLOBIN, POC g/dL  --  17.0   PLATELETS 10*3/mm3 249  --      Results from last 7 days   Lab Units 23   CREATININE mg/dL 1.20     Estimated Creatinine Clearance: 94.5 mL/min (by C-G formula based on SCr of 1.2 mg/dL).          No results found for: \"LACTATE\"     IMAGES: Chest x-ray without consolidation or effusions.  CT scan of the head reveals a right thalamic bleed    I reviewed the patient's results and images.     Assesment     Active Hospital Problems    Diagnosis     **Rt Thalamic ICH     Hypertension     Non-compliance     History of alcohol abuse      Plan/Recommendations     Admit to the intensive care unit  Blood pressure control  Start oral antihypertensive regimen  Neurosurgical consultation  Serial neurologic exam  ICH order set  Thiamine, folate, multivitamin  Monitor for evidence of alcohol withdrawal  Nicotine replacement therapy    Critical Care time spent in direct patient care: 35 minutes (excluding procedure time, if applicable) including high complexity decision making to assess, manipulate, and support vital organ system failure in this individual who has impairment of one or more vital organ systems such that there is a high probability of imminent or life threatening deterioration in the patient’s condition.    KATHY Ziegler MD  Pulmonary and Critical Care Medicine     CC: Provider, No Known    Electronically signed by Andrzej Ziegler MD at 23          Emergency Department Notes        Brice Wall MD at 23        Procedure Orders    1. Critical Care [[id number]] ordered by Brice Wall MD                  EMERGENCY DEPARTMENT ENCOUNTER    Pt Name: Chano Rees  MRN: [id number]  Pt :   1970  Room Number:    Date " "of encounter:  12/27/2023  PCP: No primary care provider on file.  ED Provider: Brice Wall MD    Historian: EMS flight crew is well as patient    HPI:  Chief Complaint: Acute onset of left-sided weakness        Context: Chano Rees is a 53 y.o. male who presents to the ED c/o acute onset left-sided weakness which started at 7:05 PM tonight.  The patient was out with his wife when he developed the sudden onset of weakness.  He does report a slight headache \"behind my right eye\".  The patient has been noncompliant with his antihypertensive medication, not taking it for over 6 months per his report.  He believes he is supposed to be taking lisinopril 10 mg.  He notes that his blood pressure is always high but does not know any recent readings.      PAST MEDICAL HISTORY  No past medical history on file.      PAST SURGICAL HISTORY  No past surgical history on file.      FAMILY HISTORY  No family history on file.      SOCIAL HISTORY         ALLERGIES  Patient has no allergy information on record.        REVIEW OF SYSTEMS  Review of Systems       All systems reviewed and negative except for those discussed in HPI.       PHYSICAL EXAM    I have reviewed the triage vital signs and nursing notes.    ED Triage Vitals   Temp Pulse Resp BP SpO2   -- -- -- -- --      Temp src Heart Rate Source Patient Position BP Location FiO2 (%)   -- -- -- -- --       Physical Exam  GENERAL:   Appears in moderate distress with left-sided neglect.  Marked hypertension.  HENT: Nares patent.  No facial droop  EYES: No scleral icterus.  Left visual neglect  CV: Regular rhythm, regular rate.  No murmurs gallops rubs clear to auscultation  RESPIRATORY: Normal effort.  No audible wheezes, rales or rhonchi.  ABDOMEN: Soft, nontender  MUSCULOSKELETAL: No deformities.   NEURO: Left arm and leg weakness with left facial droop.  See stroke navigator note for detailed NIH stroke score.  SKIN: Warm, dry, no rash visualized.      LAB RESULTS  No " results found for this or any previous visit (from the past 24 hour(s)).    If labs were ordered, I independently reviewed the results and considered them in treating the patient.        RADIOLOGY  No Radiology Exams Resulted Within Past 24 Hours    I ordered and independently reviewed the above noted radiographic studies.      I viewed images of CT head which showed right thalamic hemorrhage which is acute per my independent interpretation.    See radiologist's dictation for official interpretation.        PROCEDURES    Critical Care    Performed by: Brice Wall MD  Authorized by: Brice Wall MD    Critical care provider statement:     Critical care time (minutes):  35    Critical care time was exclusive of:  Separately billable procedures and treating other patients    Critical care was necessary to treat or prevent imminent or life-threatening deterioration of the following conditions:  CNS failure or compromise and circulatory failure    Critical care was time spent personally by me on the following activities:  Ordering and performing treatments and interventions, ordering and review of laboratory studies, ordering and review of radiographic studies, pulse oximetry, re-evaluation of patient's condition, review of old charts, obtaining history from patient or surrogate, examination of patient, evaluation of patient's response to treatment, discussions with consultants and development of treatment plan with patient or surrogate      ECG 12 Lead ED Triage Standing Order; Acute Stroke (Onset <24 hrs)   Final Result   Test Reason : ED Triage Standing Order~   Blood Pressure :   */*   mmHG   Vent. Rate :  87 BPM     Atrial Rate :  87 BPM      P-R Int : 188 ms          QRS Dur :  92 ms       QT Int : 430 ms       P-R-T Axes :  50 -24 159 degrees      QTc Int : 517 ms      Normal sinus rhythm   Minimal voltage criteria for LVH, may be normal variant   Nonspecific T wave abnormality   Prolonged QT   Abnormal  ECG   No previous ECGs available   Confirmed by JACQUELYN RALPH MD ([id number]) on 12/28/2023 2:44:38 AM      Referred By: EDMD           Confirmed By: JACQUELYN RALPH MD          MEDICATIONS GIVEN IN ER    Medications   sodium chloride 0.9 % flush 10 mL (has no administration in time range)         MEDICAL DECISION MAKING, PROGRESS, and CONSULTS    All labs, if obtained, have been independently reviewed by me.  All radiology studies, if obtained, have been reviewed by me and the radiologist dictating the report.  All EKG's, if obtained, have been independently viewed and interpreted by me/my attending physician.      Discussion below represents my analysis of pertinent findings related to patient's condition, differential diagnosis, treatment plan and final disposition.                         Differential diagnosis:    Intracranial hemorrhage versus embolic CVA, etc.      Additional sources:    - Discussed/ obtained information from independent historians: EMS helicopter flight crew gave report directly to me at bedside of CT scanner.    - External (non-ED) record review: I queried the epic chart but find no prior records.    - Chronic or social conditions impacting care: Patient reports that he drinks a large amount of whiskey on a daily basis but it is unclear if he is being serious or joking as he repeats this several times to various physicians and nurses.    - Shared decision making: Patient is in agreement with current plans for evaluation and treatment.      Orders placed during this visit:  Orders Placed This Encounter   Procedures    CT Head Without Contrast Stroke Protocol    CT Angiogram Head w AI Analysis of LVO    CT Angiogram Neck    CT CEREBRAL PERFUSION WITH & WITHOUT CONTRAST    XR Chest 1 View    [place] Draw    Single High Sensitivity Troponin T    aPTT    AST    ALT    CBC Auto Differential    NPO Diet NPO Type: Strict NPO    Initiate Code Stroke    Perform NIH Stroke Scale    Measure Actual Weight     Head of Bed 30 Degrees or Less    Undress and Gown    Continuous Pulse Oximetry    Vital Signs    Neuro Checks    Notify MD for SBP < 80 or > 200    Notify Provider for SBP > 140 if Hemorrhagic Stroke    No Hypotonic Fluids    Nursing Dysphagia Screening (Complete Prior to Giving anything PO)    RN to Place Order SLP Consult (IF swallow screen failed) - Eval & Treat Choosing Reason of RN Dysphagia Screen Failed    Inpatient Neurology Consult Stroke    Oxygen Therapy- Nasal Cannula; Titrate 1-6 LPM Per SpO2; 90 - 95%    POC CHEM 8    POCT Protime/INR    ECG 12 Lead ED Triage Standing Order; Acute Stroke (Onset <24 hrs)    Insert Large-Bore Peripheral IV - Right AC Preferred    CBC & Differential    Green Top (Gel)    Lavender Top    Gold Top - SST    Gray Top    Light Blue Top         Additional orders considered but not ordered:  CT perfusion brain.    ED Course:    Consultants: Neurosurgery.  Intensivist service.    ED Course as of 12/27/23 2145   Wed Dec 27, 2023   2120 Initial blood pressure after arrival was 225/135.  I immediately ordered labetalol 20 mg IV followed by a Cardene drip with hemorrhagic stroke protocol. [MS]   2121 I will admit to the ICU and have paged Dr. Ziegler, pulmonary critical care. [MS]   2134 I spoke with Dr. Ziegler.  Case discussed in detail.  He will admit to the ICU.  I spoke with our stroke navigator who will await the CTA's and then speak with Dr. Murphy of neurosurgery on-call. [MS]      ED Course User Index  [MS] Brice Wall MD              Shared Decision Making:  After my consideration of clinical presentation and any laboratory/radiology studies obtained, I discussed the findings with the patient/patient representative who is in agreement with the treatment plan and the final disposition.   Risks and benefits of discharge and/or observation/admission were discussed.       AS OF 21:04 EST VITALS:    BP -    HR -    TEMP -    O2 SATS -                    DIAGNOSIS  Final  diagnoses:   Intracranial hemorrhage   Uncontrolled hypertension   Personal history of noncompliance with medical treatment         DISPOSITION  Admission to ICU.      Please note that portions of this document were completed with voice recognition software. Left facial droop     Brice Wall MD  12/28/23 0244      Electronically signed by Brice Wall MD at 12/28/23 0244          Physician Progress Notes (all)        Manuel Boone MD at 01/02/24 1413            Intensive Care Follow-up     Hospital:  LOS: 6 days   Mr. Chano Rees, 53 y.o. male is followed for:   ICH (intracerebral hemorrhage)     Subjective       History of present illness:   53-year-old male with past medical history significant for hypertension and poor medical compliance.  Patient presented to emergency room on 12/27 with sudden onset of left-sided weakness and headache.  Patient had significantly elevated blood pressure readings in the emergency room.  CT head revealed 3 cm right thalamic ICH.  CTA was unremarkable and it was felt that patient with hypertensive bleed.  Patient was admitted to ICU for blood pressure control.  Patient apparently has history of alcoholism but was not drinking alcohol lately.  Patient had waxing and waning mental status.  Required respiratory assistance with BiPAP support.  He was having trouble with secretions and low-grade fever so empirically started on Zosyn therapy as well.      Subjective   Interval History:  Today morning patient was on Precedex drip.  He is somewhat sedated.  Not able to follow commands.  He is on 40% BiPAP and tolerating well.                 The patient's past medical, surgical and social history were reviewed and updated in Epic as appropriate.    Objective   Objective     Infusions:  dexmedetomidine, 0.2-1.5 mcg/kg/hr, Last Rate: 0.3 mcg/kg/hr (01/02/24 1122)  niCARdipine, 5-15 mg/hr, Last Rate: Stopped (01/02/24 1100)      Medications:  lisinopril, 20 mg, Nasogastric,  "Q24H  metoprolol tartrate, 50 mg, Nasogastric, Q12H  multivitamin pediatric chewable, 1 tablet, Nasogastric, Daily  nicotine, 1 patch, Transdermal, Q24H  piperacillin-tazobactam, 4.5 g, Intravenous, Q8H  ProSource No Carb, 30 mL, Nasogastric, BID  sodium chloride, 10 mL, Intravenous, Q12H        Vital Sign Min/Max for last 24 hours  Temp  Min: 97.7 °F (36.5 °C)  Max: 100.2 °F (37.9 °C)   BP  Min: 100/68  Max: 161/100   Pulse  Min: 65  Max: 105   Resp  Min: 26  Max: 28   SpO2  Min: 90 %  Max: 99 %   Flow (L/min)  Min: 6  Max: 6       Input/Output for last 24 hour shift  01/01 0701 - 01/02 0700  In: 3750.9 [I.V.:1561.6]  Out: 5020 [Urine:5020]      Objective:  Vital signs: (most recent): Blood pressure 113/88, pulse 71, temperature 97.7 °F (36.5 °C), temperature source Axillary, resp. rate 28, height 180.3 cm (71\"), weight 123 kg (270 lb 1 oz), SpO2 98%.            General Appearance: Lethargic on BiPAP   head:    Atraumatic, Normocephalic, without obvious abnormality, Pupils reactive & symmetrical B/L.   Lungs:   B/L Breath sounds present with decreased breath sounds on bases, no wheezing heard, no crackles.   Heart: S1 and S2 present, no murmur  Abdomen: Soft, nontender, no guarding or rigidity, bowel sounds positive.  Extremities: + edema, warm to touch.  Pulses: Positive and symmetric.  Neurologic: Not able to participate in full neurological exam      Results from last 7 days   Lab Units 01/02/24  0530 01/01/24  0459 12/31/23  0405   WBC 10*3/mm3 11.33* 12.08* 17.10*   HEMOGLOBIN g/dL 14.7 14.5 14.7   PLATELETS 10*3/mm3 201 232 225     Results from last 7 days   Lab Units 01/02/24  0530 01/01/24  1756 01/01/24  0459 12/31/23  0405 12/30/23  0457 12/28/23  1804 12/28/23  1322 12/28/23  0612 12/27/23  4548   SODIUM mmol/L 141  --  139 138 139   < >  --    < > 139  139   POTASSIUM mmol/L 4.6 4.1 3.4* 4.2 4.2   < >  --    < > 4.0  4.0   CO2 mmol/L 19.0*  --  20.0* 19.0* 21.0*   < >  --    < > 22.0  22.0   BUN " mg/dL 30*  --  28* 27* 25*   < >  --    < > 12  12   CREATININE mg/dL 1.15  --  1.18 1.15 1.40*   < >  --    < > 1.28*  1.28*   MAGNESIUM mg/dL  --   --  2.6  --  1.9  --   --   --  2.3   PHOSPHORUS mg/dL  --   --  2.7  --  4.7*  --  3.6  --  1.5*   GLUCOSE mg/dL 103*  --  97 98 109*   < >  --    < > 133*  133*    < > = values in this interval not displayed.     Estimated Creatinine Clearance: 99.2 mL/min (by C-G formula based on SCr of 1.15 mg/dL).    Results from last 7 days   Lab Units 01/01/24  0530   PH, ARTERIAL pH units 7.425   PCO2, ARTERIAL mm Hg 32.7*   PO2 ART mm Hg 80.0*       Images:   Chest x-ray reviewed and showed feeding tube terminating below the diaphragm and possibly postpyloric.  Left basilar atelectasis.  No pleural effusion.  No definite consolidation.    I reviewed the patient's results and images.     Echocardiogram reviewed  Interpretation Summary         Left ventricular systolic function is normal. Calculated left ventricular EF = 55.6%    Left ventricular diastolic function is consistent with (grade I) impaired relaxation.    Saline test results are negative.    Assessment & Plan   Impression        Rt Thalamic ICH    Hypertension    Non-compliance    History of alcohol abuse       Plan        1.  Patient presented here with ICH.  Continue to keep systolic blood pressure less than 140.  Neurology team is signed off.  2.  Ongoing encephalopathy, etiology unclear.  On CIWA protocol.  On Precedex infusion as well.  Unclear if he received thiamine supplementation.  Will go ahead and add that.  Will monitor for now and may need to repeat CT scan if not improving neurologically.  No other evidence of metabolic reasons or sepsis picture currently though remains on Zosyn for possible aspiration.  Will check procalcitonin level in the morning.  Urinalysis was negative for any acute UTI.  Toxicology screen showed benzodiazepines and no other controlled substances but patient was in the hospital  for 2 days and may have received benzodiazepine prior to that.  3.  Patient is volume overloaded.  May be playing a role in his respiratory failure.  Echocardiogram showed diastolic dysfunction.  Will go ahead and diurese him further and try to keep him in a negative fluid balance.  4.  Stop Accu-Cheks as patient is not needing any insulin coverage.  5.  Continue enteral nutrition through [employer].  Tolerating well.  Monitor bowel function.  6.  PT/OT.  7.  BiPAP at nighttime and during daytime as needed.    Continue ICU monitoring and remains at risk of decline.    Plan of care and goals reviewed with multidisciplinary/antibiotic stewardship team during rounds.   I discussed the patient's findings and my recommendations with nursing staff       Time spent  38 min  (exclusive of procedure time)  including high complexity decision making to assess, manipulate, and support vital organ system failure in this individual who has impairment of one or more vital organ systems such that there is a high probability of imminent or life threatening deterioration in the patient’s condition.      Manuel Boone MD, [facility]  Pulmonary, Critical care and Sleep Medicine         Electronically signed by Manuel Boone MD at 01/02/24 1422       Edvin Child MD at 01/01/24 1341            INTENSIVIST NOTE     Hospital Day: 5    Mr. Chano Rees, 53 y.o. male is followed for:   ICH       SUBJECTIVE     Interval history:    Temperature curve down with maximum temperature 100.2.  However, more hypoxic and placed on BiPAP overnight.  Has been tachypneic for several days.  Chest x-ray fairly unremarkable.  Fluid balance relatively even.    The patient's relevant past medical, surgical and social history were reviewed and updated in Epic as appropriate.       OBJECTIVE     Vital Sign Min/Max for last 24 hours  Temp  Min: 98.4 °F (36.9 °C)  Max: 100.2 °F (37.9 °C)   BP  Min: 105/62  Max: 207/94   Pulse  Min: 77  Max: 122  "  Resp  Min: 21  Max: 40   SpO2  Min: 89 %  Max: 100 %   No data recorded   Weight  Min: 127 kg (279 lb 12.2 oz)  Max: 127 kg (279 lb 12.2 oz)      Intake/Output Summary (Last 24 hours) at 1/1/2024 1341  Last data filed at 1/1/2024 0600  Gross per 24 hour   Intake 2458.36 ml   Output 2050 ml   Net 408.36 ml      Flowsheet Rows      Flowsheet Row First Filed Value   Admission Height 177.8 cm (70\") Documented at 12/27/2023 2152   Admission Weight 125 kg (275 lb) Documented at 12/27/2023 2152               12/30/23  0400 12/31/23  0500 01/01/24  0400   Weight: 127 kg (280 lb 3.3 oz) 130 kg (286 lb 6 oz) 127 kg (279 lb 12.2 oz)            Objective:  General Appearance:  In no acute distress.    Vital signs: (most recent): Blood pressure 143/96, pulse 96, temperature 98.4 °F (36.9 °C), temperature source Axillary, resp. rate 23, height 180.3 cm (71\"), weight 127 kg (279 lb 12.2 oz), SpO2 (!) 89%.    HEENT: Normal HEENT exam.  (NG tube  BiPAP)    Lungs:  Tachypnea.  There are rhonchi.  No rales or wheezes.    Heart: Normal rate.  Regular rhythm.  S1 normal and S2 normal.  No murmur, gallop or friction rub.   Chest: Symmetric chest wall expansion.   Abdomen: Abdomen is soft and non-distended.  Bowel sounds are normal.   There is no abdominal tenderness.   There is no mass. There is no splenomegaly. There is no hepatomegaly.   Extremities: There is no deformity or dependent edema.    Pupils:  Pupils are equal, round, and reactive to light.    Skin:  Warm and dry.            Interval:  (handoff)  1a. Level of Consciousness: 2-->Not alert, requires repeated stimulation to attend, or is obtunded and requires strong or painful stimulation to make movements (not stereotyped)  1b. LOC Questions: 2-->Answers neither question correctly  1c. LOC Commands: 2-->Performs neither task correctly  2. Best Gaze: 1-->Partial gaze palsy, gaze is abnormal in one or both eyes, but forced deviation or total gaze paresis is not present  3. " Visual: 0-->No visual loss  4. Facial Palsy: 1-->Minor paralysis (flattened nasolabial fold, asymmetry on smiling)  5a. Motor Arm, Left: 1-->Drift, limb holds 90 (or 45) degrees, but drifts down before full 10 seconds, does not hit bed or other support  5b. Motor Arm, Right: 0-->No drift, limb holds 90 (or 45) degrees for full 10 secs  6a. Motor Leg, Left: 2-->Some effort against gravity, leg falls to bed by 5 secs, but has some effort against gravity  6b. Motor Leg, Right: 0-->No drift, leg holds 30 degree position for full 5 secs  7. Limb Ataxia: 0-->Absent  8. Sensory: 1-->Mild-to-moderate sensory loss, patient feels pinprick is less sharp or is dull on the affected side, or there is a loss of superficial pain with pinprick, but patient is aware of being touched  9. Best Language: 1-->Mild-to-moderate aphasia, some obvious loss of fluency or facility of comprehension, without significant limitation on ideas expressed or form of expression. Reduction of speech and/or comprehension, however, makes conversation. . . (see row details)  10. Dysarthria: 1-->Mild-to-moderate dysarthria, patient slurs at least some words and, at worst, can be understood with some difficulty  11. Extinction and Inattention (formerly Neglect): 1-->Visual, tactile, auditory, spatial, or personal inattention or extinction to bilateral simultaneous stimulation in one of the sensory modalities    Total (NIH Stroke Scale): 15     I reviewed the patient's new clinical results.  I reviewed the patient's new imaging results/reports including actual images and agree with reports.    XR Chest 1 View    Result Date: 1/1/2024  Impression: No new chest disease. Electronically Signed: Solomon Carbone MD  1/1/2024 8:47 AM EST  Workstation ID: [id number]    XR Chest 1 View    Result Date: 12/31/2023  Impression: Placement of enteric feeding tube which courses into the stomach with the tip beyond the field-of-view. Vague bilateral interstitial opacities  suspicious for atypical/viral infection. Electronically Signed: Alex Nieto MD  12/31/2023 8:05 AM EST  Workstation ID: [id number]    CT Head Without Contrast    Result Date: 12/31/2023  Impression: 1.Stable right basal ganglia intraparenchymal hemorrhage extending into the right lateral ventricle. 2.Stable mild mass effect with 3 mm leftward shift of midline. No herniation or acute hydrocephalus. 3.No new hemorrhage. 4.Mild chronic small vessel ischemic change. Electronically Signed: Tal Meier MD  12/31/2023 6:00 AM EST  Workstation ID: [id number]      INFUSIONS  niCARdipine, 5-15 mg/hr, Last Rate: 7.5 mg/hr (01/01/24 0635)        Results from last 7 days   Lab Units 01/01/24  0459 12/31/23  0405 12/30/23  0354   WBC 10*3/mm3 12.08* 17.10* 14.97*   HEMOGLOBIN g/dL 14.5 14.7 14.9   HEMATOCRIT % 44.1 45.6 44.6   PLATELETS 10*3/mm3 232 225 256     Results from last 7 days   Lab Units 01/01/24  0459 12/31/23  0405 12/30/23  0457 12/28/23  0612 12/27/23  2358   SODIUM mmol/L 139 138 139   < > 139  139   POTASSIUM mmol/L 3.4* 4.2 4.2   < > 4.0  4.0   CHLORIDE mmol/L 107 106 106   < > 104  104   CO2 mmol/L 20.0* 19.0* 21.0*   < > 22.0  22.0   BUN mg/dL 28* 27* 25*   < > 12  12   GLUCOSE mg/dL 97 98 109*   < > 133*  133*   CREATININE mg/dL 1.18 1.15 1.40*   < > 1.28*  1.28*   MAGNESIUM mg/dL 2.6  --  1.9  --  2.3   CALCIUM mg/dL 8.6 8.7 8.8   < > 9.8  9.8   ALBUMIN g/dL 3.2*  --  3.6  --  4.3    < > = values in this interval not displayed.         Results from last 7 days   Lab Units 01/01/24  0530 12/29/23  2042   PH, ARTERIAL pH units 7.425 7.545*   PCO2, ARTERIAL mm Hg 32.7* 25.3*   PO2 ART mm Hg 80.0* 59.3*   FIO2 % 50 28       Diet, Tube Feeding Tube Feeding Formula: Peptamen Intense VHP (Peptide Based, Very High Protein)   /h  Patient doesn't have any tube feeding modular orders    Mechanical Ventilator:   Settings: Observed:                                                I reviewed the patient's  medications.    Assessment & Plan   ASSESSMENT/PLAN     Active Hospital Problems    Diagnosis     **Rt Thalamic ICH     Hypertension     Non-compliance     History of alcohol abuse        53-year-old male with a past medical history significant for hypertension and poor medical compliance.  He presented to the ER on 12/27 with sudden onset of left-sided weakness and headache.  Blood pressure in /139 initially.  CT of the head revealed a 3 cm right thalamic ICH.  CTA was unremarkable and was felt this was a hypertensive bleed.  He was admitted to the ICU for blood pressure control.  He has a history of alcoholism but reportedly has not been drinking recently    Since arrival in the ICU his neurologic status has tended to wax and wane.  His family has insisted that he has not drank any alcohol recently.  He is getting as needed benzodiazepines but he is not on scheduled benzodiazepines for this reason.  Trying to minimize sedation.      He has been tachypneic for several days but I have attributed this to his neurologic process as radiographically he is doing well from a pulmonary standpoint.  Overnight placed on BiPAP but will try to discontinue this today.  He may be having trouble with secretions unfortunately.  He has had fever and has been placed empirically on Zosyn but it is unclear whether this is central or related to an infectious process but there is certainly nothing obvious in regards to the latter.      Plan:    Try off BiPAP as noted above  Goal SBP less than 140 mmHg  Cardene drip as needed  Oral Lopressor and lisinopril.  Have increased lisinopril  Nicotine patch  Thiamine and folate  As needed benzodiazepines per CIWA scale.  Trying to minimize sedation.  Zosyn empirically  Monitor fever curve.  Central versus infectious  Continue tube feeds  Additional Lasix     I discussed the patient's findings and my recommendations with patient, family, and nursing staff     Plan of care and goals reviewed  with multidisciplinary team at daily rounds.    High level of risk due to:  Acute illness with threat to life or bodily function.    Edvin Child MD  Pulmonary and Critical Care Medicine  01/01/24 13:41 EST         Electronically signed by Edvin Child MD at 01/01/24 1351       Edvin Coffey MD at 01/01/24 1315          Neurology Note    Patient:  Chano Rees    YOB: 1970    REFERRING PHYSICIAN:  Dr. Ziegler    CHIEF COMPLAINT:    Left-sided weakness    HISTORY OF PRESENT ILLNESS:   The patient is more calm, getting prn Ativan, Tm 100.2. Remains on Cardene. Getting TF.    Past Medical History:  Past Medical History:   Diagnosis Date    Hypertension        Past Surgical History:  Past Surgical History:   Procedure Laterality Date    ANKLE SURGERY Left        Social History:   Social History     Socioeconomic History    Marital status: Single   Tobacco Use    Smoking status: Former     Types: Cigarettes    Smokeless tobacco: Current     Types: Chew   Vaping Use    Vaping Use: Never used   Substance and Sexual Activity    Alcohol use: Not Currently    Drug use: Not Currently    Sexual activity: Defer        Family History:   History reviewed. No pertinent family history.    Medications Prior to Admission:    Prior to Admission medications    Not on File       Allergies:  Shellfish-derived products      Review of system  Review of Systems   Unable to perform ROS: Mental status change       Vitals:    01/01/24 1307   BP: 143/96   Pulse: 96   Resp:    Temp:    SpO2:        Physical exam  Physical Exam  Cardiovascular:      Rate and Rhythm: Normal rate and regular rhythm.   Pulmonary:      Effort: Tachypnea present.   Neurological:      Comments: Alerts to voice, makes eye contact, inattentive, speech dysarthric, moves right side well, left side with trace movement.           Lab Results   Component Value Date    WBC 12.08 (H) 01/01/2024    HGB 14.5 01/01/2024    HCT  44.1 01/01/2024    MCV 91.1 01/01/2024     01/01/2024     Lab Results   Component Value Date    GLUCOSE 97 01/01/2024    BUN 28 (H) 01/01/2024    CREATININE 1.18 01/01/2024    BCR 23.7 01/01/2024    CO2 20.0 (L) 01/01/2024    CALCIUM 8.6 01/01/2024    ALBUMIN 3.2 (L) 01/01/2024    AST 26 01/01/2024    ALT 23 01/01/2024         Radiological Studies:   CT HEAD WO CONTRAST     Date of Exam: 12/31/2023 5:35 AM EST     Indication: Stroke, follow up.     Comparison: 12/29/2023     Technique: Axial CT images were obtained of the head without contrast administration.  Automated exposure control and iterative construction methods were used.        Findings:  There is a stable intraparenchymal hemorrhage in the right basal ganglia. This measures up to 3.2 x 2.1 x 4.0 cm (approximately 13 cc) and extends into the right lateral ventricle. There is trace intraventricular hemorrhage, which is slightly decreased   compared to the prior study. There is no new hemorrhage. There is edema surrounding the right basal ganglia hemorrhage with mild mass effect and 3 mm leftward shift of midline. There is no evidence of herniation or acute hydrocephalus. There is mild   stable underlying chronic small vessel ischemic change. No new hypoattenuating lesions in the brain. There are mild intracranial vascular calcifications. There is mild right maxillary sinus mucosal disease. The mastoids are clear. The calvarium is   intact. Orbits are unremarkable. Superficial soft tissues appear within normal limits.     IMPRESSION:  Impression:  1.Stable right basal ganglia intraparenchymal hemorrhage extending into the right lateral ventricle.  2.Stable mild mass effect with 3 mm leftward shift of midline. No herniation or acute hydrocephalus.  3.No new hemorrhage.  4.Mild chronic small vessel ischemic change.             During this visit the following were done:  Labs Reviewed [x]    Labs Ordered []    Radiology Reports Reviewed [x]    Radiology  Ordered []    EKG, echo, and/or stress test reviewed []    EEG results reviewed  []    EEG reviewed and interpreted per myself   []    Discussed case with neurointerventionalist or neuroradiologist []    Referring Provider Records Reviewed []    ER Records Reviewed []    Hospital Records Reviewed []    History Obtained From Family []    Radiological images view and Interpreted per myself [x]    Case Discussed with referring provider []     Decision to obtain and request outside records  []        Assessment and Plan     Acute right BG ICH with mild mass effect 22 HTN, noncompliant with meds. F/U CTH stable on 12/31. TME with agitation, fever, suspected aspiration PNA and pulmonary edema, improved with Lasix. Started on CIWA for suspected alcohol WD but family denies him drinking. Neurologically stable.   - Continue ICU observation.   - BP<140/80.   - Minimize sedatives, Ativan prn agitation.   - ST, PT, OT. Likely subacute rehab.   - Stroke neurology clinic F/U.    Call for questions, will see prn. Thanks.                 Electronically signed by Edvin Coffey MD on 1/1/2024 at 13:15 EST        Electronically signed by Edvin Coffey MD at 01/01/24 2001       Edvin Child MD at 12/31/23 1419            INTENSIVIST NOTE     Hospital Day: 4    Mr. Chano Rees, 53 y.o. male is followed for:   ICH       SUBJECTIVE     Interval history:    Maximum temperature 102.3.  Cultures negative.  Remains on empiric antimicrobial therapy.  Follow-up chest x-ray unimpressive for any consolidation.  Mental status continues to wax and wane.  Fluid balance +2.8 L.    The patient's relevant past medical, surgical and social history were reviewed and updated in Epic as appropriate.       OBJECTIVE     Vital Sign Min/Max for last 24 hours  Temp  Min: 97.4 °F (36.3 °C)  Max: 102.3 °F (39.1 °C)   BP  Min: 87/60  Max: 207/94   Pulse  Min: 75  Max: 122   Resp  Min: 28  Max: 40   SpO2  Min: 88 %  Max: 96 %  "  No data recorded   Weight  Min: 130 kg (286 lb 6 oz)  Max: 130 kg (286 lb 6 oz)      Intake/Output Summary (Last 24 hours) at 12/31/2023 1419  Last data filed at 12/31/2023 1404  Gross per 24 hour   Intake 5151.1 ml   Output 2350 ml   Net 2801.1 ml      Flowsheet Rows      Flowsheet Row First Filed Value   Admission Height 177.8 cm (70\") Documented at 12/27/2023 2152   Admission Weight 125 kg (275 lb) Documented at 12/27/2023 2152               12/28/23  0101 12/30/23  0400 12/31/23  0500   Weight: 126 kg (278 lb 7.1 oz) 127 kg (280 lb 3.3 oz) 130 kg (286 lb 6 oz)            Objective:  General Appearance:  In no acute distress.    Vital signs: (most recent): Blood pressure (!) 207/94, pulse 116, temperature 99.6 °F (37.6 °C), temperature source Axillary, resp. rate (!) 40, height 180.3 cm (71\"), weight 130 kg (286 lb 6 oz), SpO2 (!) 89%.    HEENT: Normal HEENT exam.    Lungs:  Normal effort and normal respiratory rate.  Breath sounds clear to auscultation.  He is not in respiratory distress.  No rales, wheezes or rhonchi.    Heart: Normal rate.  Regular rhythm.  S1 normal and S2 normal.  No murmur, gallop or friction rub.   Chest: Symmetric chest wall expansion.   Abdomen: Abdomen is soft and non-distended.  Bowel sounds are normal.   There is no abdominal tenderness.   There is no mass. There is no splenomegaly. There is no hepatomegaly.   Extremities: There is no deformity or dependent edema.    Pupils:  Pupils are equal, round, and reactive to light.    Skin:  Warm and dry.            Interval:  (handoff)  1a. Level of Consciousness: 1-->Not alert, but arousable by minor stimulation to obey, answer, or respond  1b. LOC Questions: 1-->Answers one question correctly  1c. LOC Commands: 0-->Performs both tasks correctly  2. Best Gaze: 0-->Normal  3. Visual: 0-->No visual loss  4. Facial Palsy: 1-->Minor paralysis (flattened nasolabial fold, asymmetry on smiling)  5a. Motor Arm, Left: 3-->No effort against gravity, " limb falls  5b. Motor Arm, Right: 0-->No drift, limb holds 90 (or 45) degrees for full 10 secs  6a. Motor Leg, Left: 2-->Some effort against gravity, leg falls to bed by 5 secs, but has some effort against gravity  6b. Motor Leg, Right: 0-->No drift, leg holds 30 degree position for full 5 secs  7. Limb Ataxia: 1-->Present in one limb  8. Sensory: 1-->Mild-to-moderate sensory loss, patient feels pinprick is less sharp or is dull on the affected side, or there is a loss of superficial pain with pinprick, but patient is aware of being touched  9. Best Language: 1-->Mild-to-moderate aphasia, some obvious loss of fluency or facility of comprehension, without significant limitation on ideas expressed or form of expression. Reduction of speech and/or comprehension, however, makes conversation. . . (see row details)  10. Dysarthria: 1-->Mild-to-moderate dysarthria, patient slurs at least some words and, at worst, can be understood with some difficulty  11. Extinction and Inattention (formerly Neglect): 0-->No abnormality    Total (NIH Stroke Scale): 12     I reviewed the patient's new clinical results.  I reviewed the patient's new imaging results/reports including actual images and agree with reports.    XR Chest 1 View    Result Date: 12/31/2023  Impression: Placement of enteric feeding tube which courses into the stomach with the tip beyond the field-of-view. Vague bilateral interstitial opacities suspicious for atypical/viral infection. Electronically Signed: Alex Nieto MD  12/31/2023 8:05 AM EST  Workstation ID: [id number]    CT Head Without Contrast    Result Date: 12/31/2023  Impression: 1.Stable right basal ganglia intraparenchymal hemorrhage extending into the right lateral ventricle. 2.Stable mild mass effect with 3 mm leftward shift of midline. No herniation or acute hydrocephalus. 3.No new hemorrhage. 4.Mild chronic small vessel ischemic change. Electronically Signed: Tal Meier MD  12/31/2023 6:00 AM  EST  Workstation ID: [id number]    XR Abdomen KUB    Result Date: 12/29/2023  Impression: Weighted tip feeding catheter tip overlies the duodenal/jejunal junction. Electronically Signed: Victor M Guevara MD  12/29/2023 10:34 PM EST  Workstation ID: [id number]    XR Chest 1 View    Result Date: 12/29/2023  Impression: 1. Mildly increased patchy left basilar interstitial changes, potentially a developing pneumonia. Increased peribronchial thickening potentially bronchitis. 2. Cardiomegaly and mild pulmonary venous hypertension. Electronically Signed: Solomon Carbone MD  12/29/2023 8:58 PM EST  Workstation ID: [id number]    CT Head Without Contrast    Result Date: 12/29/2023  Impression: No significant change with similar 3 cm right thalamic hemorrhage, 3 mm midline shift, and trace intraventricular blood products. Electronically Signed: Victor M Guevara MD  12/29/2023 7:50 PM EST  Workstation ID: [id number]      INFUSIONS  niCARdipine, 5-15 mg/hr, Last Rate: 15 mg/hr (12/31/23 1336)        Results from last 7 days   Lab Units 12/31/23  0405 12/30/23  0354 12/29/23  0430   WBC 10*3/mm3 17.10* 14.97* 19.30*   HEMOGLOBIN g/dL 14.7 14.9 15.3   HEMATOCRIT % 45.6 44.6 47.1   PLATELETS 10*3/mm3 225 256 264     Results from last 7 days   Lab Units 12/31/23  0405 12/30/23  0457 12/29/23  0430 12/28/23  0612 12/27/23  2358   SODIUM mmol/L 138 139 139   < > 139  139   POTASSIUM mmol/L 4.2 4.2 4.6   < > 4.0  4.0   CHLORIDE mmol/L 106 106 106   < > 104  104   CO2 mmol/L 19.0* 21.0* 23.0   < > 22.0  22.0   BUN mg/dL 27* 25* 20   < > 12  12   GLUCOSE mg/dL 98 109* 117*   < > 133*  133*   CREATININE mg/dL 1.15 1.40* 1.30*   < > 1.28*  1.28*   MAGNESIUM mg/dL  --  1.9  --   --  2.3   CALCIUM mg/dL 8.7 8.8 9.4   < > 9.8  9.8   ALBUMIN g/dL  --  3.6  --   --  4.3    < > = values in this interval not displayed.         Results from last 7 days   Lab Units 12/29/23 2042   PH, ARTERIAL pH units 7.545*   PCO2, ARTERIAL mm Hg 25.3*   PO2 ART mm Hg  59.3*   FIO2 % 28       Diet, Tube Feeding Tube Feeding Formula: Peptamen Intense VHP (Peptide Based, Very High Protein)   /h  Patient doesn't have any tube feeding modular orders    Mechanical Ventilator:   Settings: Observed:                                                I reviewed the patient's medications.    Assessment & Plan   ASSESSMENT/PLAN     Active Hospital Problems    Diagnosis     **Rt Thalamic ICH     Hypertension     Non-compliance     History of alcohol abuse        53-year-old male with a past medical history significant for hypertension and poor medical compliance.  He presented to the ER on 12/27 with sudden onset of left-sided weakness and headache.  Blood pressure in /139 initially.  CT of the head revealed a 3 cm right thalamic ICH.  CTA was unremarkable and was felt this was a hypertensive bleed.  He was admitted to the ICU for blood pressure control.  He has a history of alcoholism but reportedly has not been drinking recently    Since arrival in the ICU his neurologic status has tended to wax and wane.  His family has insisted that he has not drank any alcohol recently.  He is getting as needed benzodiazepines but he is not on scheduled benzodiazepines for this reason.  Trying to minimize sedation.      He has had fever and has been placed empirically on Zosyn but it is unclear whether this is central or related to an infectious process but there is certainly nothing obvious in regards to the latter.  His mental status continues to wax and wane and he can be very sedated but needs some kind of restraint at times.    Plan:    I am going to reduce his normal saline as he should be getting plenty of fluid with his tube feeds and Cardene.  Goal SBP less than 140 mmHg  Cardene drip as needed  Oral Lopressor and lisinopril.  Increase lisinopril.  Nicotine patch  Thiamine and folate  As needed benzodiazepines per CIWA scale  Zosyn empirically  Monitor fever curve.  This could certainly be  central as well as infectious related  Continue tube feeds  Lasix x 1 dose     I discussed the patient's findings and my recommendations with patient, family, and nursing staff     Plan of care and goals reviewed with multidisciplinary team at daily rounds.    High level of risk due to:  Acute illness with threat to life or bodily function.    Edvin Child MD  Pulmonary and Critical Care Medicine  12/31/23 14:19 EST         Electronically signed by Edvin Child MD at 12/31/23 1426       Edvin Coffey MD at 12/31/23 1244          Neurology Note    Patient:  Chano Rees    YOB: 1970    REFERRING PHYSICIAN:  Dr. Ziegler    CHIEF COMPLAINT:    AMS    HISTORY OF PRESENT ILLNESS:   The patient remains restless and tachypneic, got Ativan this am, no seizures, NIHSS 12, remains on Cardene. Tm 99.5.    Past Medical History:  Past Medical History:   Diagnosis Date    Hypertension        Past Surgical History:  Past Surgical History:   Procedure Laterality Date    ANKLE SURGERY Left        Social History:   Social History     Socioeconomic History    Marital status: Single   Tobacco Use    Smoking status: Former     Types: Cigarettes    Smokeless tobacco: Current     Types: Chew   Vaping Use    Vaping Use: Never used   Substance and Sexual Activity    Alcohol use: Not Currently    Drug use: Not Currently    Sexual activity: Defer        Family History:   History reviewed. No pertinent family history.    Medications Prior to Admission:    Prior to Admission medications    Not on File       Allergies:  Shellfish-derived products      Review of system  Review of Systems   Unable to perform ROS: Mental status change       Vitals:    12/31/23 1200   BP: 136/81   Pulse: 108   Resp: (!) 33   Temp: 99.6 °F (37.6 °C)   SpO2: 90%       Physical exam  Physical Exam  Eyes:      Pupils: Pupils are equal, round, and reactive to light.   Cardiovascular:      Rate and Rhythm: Normal  rate and regular rhythm.   Pulmonary:      Effort: Tachypnea and accessory muscle usage present.   Neurological:      Deep Tendon Reflexes: Babinski sign absent on the right side. Babinski sign present on the left side.      Comments: Drowsy, opens eyes to voice, does not speak, left facial droop, moves right side well, left side minimally.           Lab Results   Component Value Date    WBC 17.10 (H) 12/31/2023    HGB 14.7 12/31/2023    HCT 45.6 12/31/2023    MCV 92.1 12/31/2023     12/31/2023     Lab Results   Component Value Date    GLUCOSE 98 12/31/2023    BUN 27 (H) 12/31/2023    CREATININE 1.15 12/31/2023    BCR 23.5 12/31/2023    CO2 19.0 (L) 12/31/2023    CALCIUM 8.7 12/31/2023    ALBUMIN 3.6 12/30/2023    AST 28 12/27/2023    ALT 41 12/27/2023         Radiological Studies:   XR Chest 1 View    Result Date: 12/31/2023  XR CHEST 1 VW Date of Exam: 12/31/2023 3:10 AM EST Indication: Hypoxia Comparison: Chest x-ray 12/29/2023 Findings: Redemonstration of right upper extremity PICC line. Interval placement of enteric feeding tube which courses in the stomach with the tip beyond the field-of-view. Stable cardiomegaly. The left costophrenic angle is included from the field-of-view. There are vague interstitial opacities bilaterally similar to prior. No definite pleural effusion. No pneumothorax.     Impression: Placement of enteric feeding tube which courses into the stomach with the tip beyond the field-of-view. Vague bilateral interstitial opacities suspicious for atypical/viral infection. Electronically Signed: Alex Nieto MD  12/31/2023 8:05 AM EST  Workstation ID: [id number]    CT Head Without Contrast    Result Date: 12/31/2023  CT HEAD WO CONTRAST Date of Exam: 12/31/2023 5:35 AM EST Indication: Stroke, follow up. Comparison: 12/29/2023 Technique: Axial CT images were obtained of the head without contrast administration.  Automated exposure control and iterative construction methods were used.  Findings: There is a stable intraparenchymal hemorrhage in the right basal ganglia. This measures up to 3.2 x 2.1 x 4.0 cm (approximately 13 cc) and extends into the right lateral ventricle. There is trace intraventricular hemorrhage, which is slightly decreased compared to the prior study. There is no new hemorrhage. There is edema surrounding the right basal ganglia hemorrhage with mild mass effect and 3 mm leftward shift of midline. There is no evidence of herniation or acute hydrocephalus. There is mild stable underlying chronic small vessel ischemic change. No new hypoattenuating lesions in the brain. There are mild intracranial vascular calcifications. There is mild right maxillary sinus mucosal disease. The mastoids are clear. The calvarium is intact. Orbits are unremarkable. Superficial soft tissues appear within normal limits.     Impression: 1.Stable right basal ganglia intraparenchymal hemorrhage extending into the right lateral ventricle. 2.Stable mild mass effect with 3 mm leftward shift of midline. No herniation or acute hydrocephalus. 3.No new hemorrhage. 4.Mild chronic small vessel ischemic change. Electronically Signed: Tal Meier MD  12/31/2023 6:00 AM EST  Workstation ID: [id number]    XR Abdomen KUB    Result Date: 12/29/2023  XR ABDOMEN KUB Date of Exam: 12/29/2023 9:30 PM EST Indication: NG placement Comparison: None available. Findings: Weighted tip feeding catheter tip overlies the duodenal/jejunal junction. Visualized bowel gas pattern is nonobstructive.     Impression: Weighted tip feeding catheter tip overlies the duodenal/jejunal junction. Electronically Signed: Victor M Guevara MD  12/29/2023 10:34 PM EST  Workstation ID: [id number]    XR Chest 1 View    Result Date: 12/29/2023  XR CHEST 1 VW Date of Exam: 12/29/2023 8:21 PM EST Indication: fever Comparison: 12/27/2023 Findings: Cardiothymic silhouette appears enlarged as on the prior study. There is cephalization of the vasculature.  Peribronchial thickening appears increased, and there is persistent, mildly increased patchy interstitial disease in the left lung base that may represent developing bronchitis or pneumonia. No dense lung consolidation, effusion or pneumothorax is seen.     Impression: 1. Mildly increased patchy left basilar interstitial changes, potentially a developing pneumonia. Increased peribronchial thickening potentially bronchitis. 2. Cardiomegaly and mild pulmonary venous hypertension. Electronically Signed: Solomon Carbone MD  12/29/2023 8:58 PM EST  Workstation ID: [id number]    CT Head Without Contrast    Result Date: 12/29/2023  CT HEAD WO CONTRAST Date of Exam: 12/29/2023 7:12 PM EST Indication: Neuro deficit, acute, stroke suspected AMS, aphasia, f/u ICH. Comparison: Head CT same date timed 00:28. Technique: Axial CT images were obtained of the head without contrast administration.  Automated exposure control and iterative construction methods were used. Findings: Similar size and morphology of the hemorrhage centered within the right thalamus measuring around 3 cm in maximum dimension. Similar thin rim of surrounding edema. Similar 3 mm of midline shift. Similar trace blood products layering within the posterior horns of the lateral ventricles. No new or enlarging intracranial hemorrhage. No new mass effect. Paranasal sinuses and mastoid air cells are clear. No acute or suspicious soft tissue or osseous lesion.     Impression: No significant change with similar 3 cm right thalamic hemorrhage, 3 mm midline shift, and trace intraventricular blood products. Electronically Signed: Victor M Guevara MD  12/29/2023 7:50 PM EST  Workstation ID: [id number]    CT Head Without Contrast    Result Date: 12/29/2023  CT HEAD WO CONTRAST Date of Exam: 12/29/2023 12:22 AM EST Indication: Anisocoria Drowsiness. Comparison: CT scan of the head dated December 28, 2023 at 11:56 a.m. Technique: Axial CT images were obtained of the head without  contrast administration.  Automated exposure control and iterative construction methods were used. Findings: There is been no significant change in the acute right thalamic hemorrhage. Maximum diameter is about 3 cm. Small amount of intraventricular hemorrhage is seen in the posterior horn the right lateral ventricle, unchanged. The degree of right to left shift is stable at about 3 mm. Iodinated contrast is again identified in the cerebral arteries. The paranasal sinuses are clear. There are no new areas of hemorrhage or abnormal extra-axial fluid collections.     Impression: Stable CT head. Stable 3 cm right thalamic hemorrhage with right to left shift of 3 mm. Electronically Signed: Christoph Richardson MD  12/29/2023 1:12 AM EST  Workstation ID: [id number]    MRI Brain Without Contrast    Result Date: 12/28/2023  MRI BRAIN WO CONTRAST Date of Exam: 12/28/2023 10:06 PM EST Indication: Stroke, follow up.  Comparison: Same day head CT. Technique:  Routine multiplanar/multisequence sequence images of the brain were obtained without contrast administration. Please note this examination is significantly motion degraded. Findings: Within the confines of motion artifact as above, similar size of the right thalamic hemorrhage measuring around 3 cm in maximum dimension. Similar small volume layering blood products in the right lateral ventricle. Ventricular caliber is unchanged. Similar 2 to 3 mm of midline shift. No discrete evidence of restricted diffusion otherwise to suggest acute/subacute infarction.     Impression: Please note this examination is significantly motion degraded. Within these confines, there is overall similar size/volume of the right thalamic hemorrhage and small volume intraventricular blood products in the right ventricle. Similar 2-3 mm midline shift. No gross evidence of new acute intracranial process otherwise, though majority of sequences are nondiagnostic. Electronically Signed: Victor M Guevara MD   12/28/2023 11:19 PM EST  Workstation ID: [id number]    CT Head Without Contrast    Result Date: 12/28/2023  CT HEAD WO CONTRAST Date of Exam: 12/28/2023 11:55 AM EST Indication: Stroke, follow up. Comparison: 12/28/2023 Technique: Axial CT images were obtained of the head without contrast administration.  Automated exposure control and iterative construction methods were used. Findings: No significant change in right thalamic acute hemorrhage measuring 3 cm in longest diameter. There is redemonstration of a small amount of intraventricular hemorrhage within the posterior horn of the right lateral ventricle. Mild right to left midline shift is again identified of approximately 2 to 3 mm. Residual contrast within the cerebral arteries. Orbits paranasal sinuses and mastoid air cells are unremarkable. Vascular calcifications are identified.     Impression: 1. No significant change in right thalamic hemorrhage measuring around 3 cm in diameter with mild right to left midline shift. Electronically Signed: Tj Tyson MD  12/28/2023 12:12 PM EST  Workstation ID: [id number]    Adult Transthoracic Echo Complete W/ Cont if Necessary Per Protocol (With Agitated Saline)    Result Date: 12/28/2023    Left ventricular systolic function is normal. Calculated left ventricular EF = 55.6%   Left ventricular diastolic function is consistent with (grade I) impaired relaxation.   Saline test results are negative.     CT Head Without Contrast    Result Date: 12/28/2023  CT HEAD WO CONTRAST Date of Exam: 12/28/2023 5:05 AM EST Indication: Stroke, follow up. Comparison: CT scan of the head dated December 27, 2023 at 2105 hours Technique: Axial CT images were obtained of the head without contrast administration.  Automated exposure control and iterative construction methods were used. Findings: There has been very slight increase in the size of the right thalamic hemorrhage which was 3 cm and is now 3.2 cm. There is new right posterior horn  lateral ventricle intraventricular hemorrhage which is very mild measuring less than 3 mm in thickness and about 7 mm in transverse diameter layering in the posterior horn. There is mild right to left midline shift of about 2 mm. Contrast is seen in the middle cerebral arteries. There are no abnormal extra-axial fluid collections. There are no new hemorrhages.     Impression: Very slight increase in the size of the right thalamic hemorrhage 3.2 cm in diameter previously 3 cm with 2 mm of right to left midline shift and a trace amount of posterior horn right lateral ventricle intraventricular hemorrhage. Electronically Signed: Christoph Richardson MD  12/28/2023 5:33 AM EST  Workstation ID: [id number]    XR Chest 1 View    Result Date: 12/27/2023  XR CHEST 1 VW Date of Exam: 12/27/2023 9:49 PM EST Indication: Acute Stroke Protocol (onset < 12 hrs) Comparison: None available. Findings: Enlarged cardiac silhouette. Ectatic thoracic aorta. No focal consolidation. No pleural effusion. No pneumothorax. No acute osseous abnormality.     Impression: No focal consolidation. Enlarged cardiac silhouette and ectatic thoracic aorta. Electronically Signed: Victor M Guevara MD  12/27/2023 10:17 PM EST  Workstation ID: [id number]    CT Angiogram Head w AI Analysis of LVO    Result Date: 12/27/2023  CT ANGIOGRAM HEAD W AI ANALYSIS OF LVO, CT ANGIOGRAM NECK Date of Exam: 12/27/2023 9:10 PM EST Indication: Neuro deficit, acute stroke suspected Neuro deficit, acute stroke suspected. Comparison: Same day noncontrast head CT. Technique: CTA of the head was performed after the uneventful intravenous administration of 75 mL Isovue-370. Reconstructed coronal and sagittal images were also obtained. In addition, a 3-D volume rendered image was created for interpretation. Automated  exposure control and iterative reconstruction methods were used. Findings: Vascular findings: Bilateral common carotid arteries are patent. Low-grade mixed atheromatous disease  of the carotid bifurcations without significant stenosis by NASCET criteria. Bilateral cervical and intracranial portions of the internal carotid arteries are patent. Bilateral MCAs are patent. Bilateral ACAs are patent. Vertebral arteries are patent. Basilar artery is patent. Bilateral PCAs are patent. No evidence of dissection or aneurysm. Nonvascular findings: Visualized lung apices are grossly clear. Glandular tissue is symmetric and normal appearing. Aerodigestive structures are within normal limits. No acute or suspicious soft tissue or osseous lesion. Right thalamic hemorrhage as discussed on noncontrast head CT.     Impression: No evidence of large vessel occlusion, flow-limiting stenosis, dissection, or aneurysm. Electronically Signed: Victor M Guevara MD  12/27/2023 9:46 PM EST  Workstation ID: [id number]    CT Angiogram Neck    Result Date: 12/27/2023  CT ANGIOGRAM HEAD W AI ANALYSIS OF LVO, CT ANGIOGRAM NECK Date of Exam: 12/27/2023 9:10 PM EST Indication: Neuro deficit, acute stroke suspected Neuro deficit, acute stroke suspected. Comparison: Same day noncontrast head CT. Technique: CTA of the head was performed after the uneventful intravenous administration of 75 mL Isovue-370. Reconstructed coronal and sagittal images were also obtained. In addition, a 3-D volume rendered image was created for interpretation. Automated  exposure control and iterative reconstruction methods were used. Findings: Vascular findings: Bilateral common carotid arteries are patent. Low-grade mixed atheromatous disease of the carotid bifurcations without significant stenosis by NASCET criteria. Bilateral cervical and intracranial portions of the internal carotid arteries are patent. Bilateral MCAs are patent. Bilateral ACAs are patent. Vertebral arteries are patent. Basilar artery is patent. Bilateral PCAs are patent. No evidence of dissection or aneurysm. Nonvascular findings: Visualized lung apices are grossly clear. Glandular  tissue is symmetric and normal appearing. Aerodigestive structures are within normal limits. No acute or suspicious soft tissue or osseous lesion. Right thalamic hemorrhage as discussed on noncontrast head CT.     Impression: No evidence of large vessel occlusion, flow-limiting stenosis, dissection, or aneurysm. Electronically Signed: Victor M Guevara MD  12/27/2023 9:46 PM EST  Workstation ID: [id number]    CT Head Without Contrast Stroke Protocol    Result Date: 12/27/2023  CT HEAD WO CONTRAST STROKE PROTOCOL Date of Exam: 12/27/2023 9:03 PM EST Indication: Neuro deficit, acute, stroke suspected Neuro deficit, acute stroke suspected. Comparison: None available. Technique: Axial CT images were obtained of the head without contrast administration.  Reconstructed coronal images were also obtained. Automated exposure control and iterative construction methods were used. Scan Time: 2105 Results discussed with the stroke coordinator via telephone by Victor M Guevara MD at 2106. Findings: 3.0 x 3.0 x 2.3 cm parenchymal hemorrhage centered in the right thalamus. Mild surrounding edema with mild regional mass effect without overt midline. No evidence of intraventricular blood products. Lacunar infarct in the left basal ganglia. No extra-axial collection. Ventricular system is nondilated. Basal cisterns are patent. No acute or suspicious soft tissue or osseous lesion. Paranasal sinuses and mastoid air cells are well aerated.     Impression: Right thalamic hemorrhage measuring up to 3 cm. Electronically Signed: Victor M Guevara MD  12/27/2023 9:36 PM EST  Workstation ID: [id number]         During this visit the following were done:  Labs Reviewed [x]    Labs Ordered []    Radiology Reports Reviewed [x]    Radiology Ordered []    EKG, echo, and/or stress test reviewed [x]    EEG results reviewed  []    EEG reviewed and interpreted per myself   []    Discussed case with neurointerventionalist or neuroradiologist []    Referring Provider  "Records Reviewed []    ER Records Reviewed []    Hospital Records Reviewed []    History Obtained From Family [x]    Radiological images view and Interpreted per myself [x]    Case Discussed with referring provider []     Decision to obtain and request outside records  []        Assessment and Plan     Acute right BG ICH with mild mass effect 22 HTN, noncompliant with meds. F/U CTH stable. TME with agitation, fever, suspected aspiration PNA. Started on CIWA for suspected alcohol WD but family denies him drinking.   - Continue ICU observation.   - BP<140/80.   - Minimize sedatives, Ativan prn agitation.   - ST, PT, OT.                 Electronically signed by Edvin Coffey MD on 12/31/2023 at 12:44 EST        Electronically signed by Edvin Coffey MD at 12/31/23 2011       Edvin Child MD at 12/30/23 1418            INTENSIVIST NOTE     Hospital Day: 3    Mr. Chano Rees, 53 y.o. male is followed for:   ICH       SUBJECTIVE     Interval history:    Febrile up to 102.7 last night.  Cultures and antibiotics initiated.  Fluid balance +3.4 L.  Neurologic status continues to wax and wane.    The patient's relevant past medical, surgical and social history were reviewed and updated in Epic as appropriate.       OBJECTIVE     Vital Sign Min/Max for last 24 hours  Temp  Min: 97.9 °F (36.6 °C)  Max: 102.7 °F (39.3 °C)   BP  Min: 98/64  Max: 164/90   Pulse  Min: 75  Max: 128   Resp  Min: 25  Max: 38   SpO2  Min: 85 %  Max: 96 %   No data recorded   Weight  Min: 127 kg (280 lb 3.3 oz)  Max: 127 kg (280 lb 3.3 oz)      Intake/Output Summary (Last 24 hours) at 12/30/2023 1418  Last data filed at 12/30/2023 1400  Gross per 24 hour   Intake 4819.7 ml   Output 1400 ml   Net 3419.7 ml      Flowsheet Rows      Flowsheet Row First Filed Value   Admission Height 177.8 cm (70\") Documented at 12/27/2023 2152   Admission Weight 125 kg (275 lb) Documented at 12/27/2023 2152 12/27/23 2152 " "12/28/23  0101 12/30/23  0400   Weight: 125 kg (275 lb) 126 kg (278 lb 7.1 oz) 127 kg (280 lb 3.3 oz)            Objective:  General Appearance:  In no acute distress.    Vital signs: (most recent): Blood pressure 137/85, pulse 103, temperature 98.5 °F (36.9 °C), temperature source Axillary, resp. rate 28, height 180.3 cm (71\"), weight 127 kg (280 lb 3.3 oz), SpO2 93%.    HEENT: Normal HEENT exam.    Lungs:  Normal effort and normal respiratory rate.  Breath sounds clear to auscultation.  He is not in respiratory distress.  No rales, wheezes or rhonchi.    Heart: Normal rate.  Regular rhythm.  S1 normal and S2 normal.  No murmur, gallop or friction rub.   Chest: Symmetric chest wall expansion.   Abdomen: Abdomen is soft and non-distended.  Bowel sounds are normal.   There is no abdominal tenderness.   There is no mass. There is no splenomegaly. There is no hepatomegaly.   Extremities: There is no deformity or dependent edema.    Pupils:  Pupils are equal, round, and reactive to light.    Skin:  Warm and dry.            Interval:  (handoff)  1a. Level of Consciousness: 1-->Not alert, but arousable by minor stimulation to obey, answer, or respond  1b. LOC Questions: 1-->Answers one question correctly  1c. LOC Commands: 0-->Performs both tasks correctly  2. Best Gaze: 0-->Normal  3. Visual: 0-->No visual loss  4. Facial Palsy: 1-->Minor paralysis (flattened nasolabial fold, asymmetry on smiling)  5a. Motor Arm, Left: 3-->No effort against gravity, limb falls  5b. Motor Arm, Right: 1-->Drift, limb holds 90 (or 45) degrees, but drifts down before full 10 secs, does not hit bed or other support  6a. Motor Leg, Left: 2-->Some effort against gravity, leg falls to bed by 5 secs, but has some effort against gravity  6b. Motor Leg, Right: 1-->Drift, leg falls by the end of the 5-sec period but does not hit bed  7. Limb Ataxia: 1-->Present in one limb  8. Sensory: 1-->Mild-to-moderate sensory loss, patient feels pinprick is " less sharp or is dull on the affected side, or there is a loss of superficial pain with pinprick, but patient is aware of being touched  9. Best Language: 1-->Mild-to-moderate aphasia, some obvious loss of fluency or facility of comprehension, without significant limitation on ideas expressed or form of expression. Reduction of speech and/or comprehension, however, makes conversation. . . (see row details)  10. Dysarthria: 1-->Mild-to-moderate dysarthria, patient slurs at least some words and, at worst, can be understood with some difficulty  11. Extinction and Inattention (formerly Neglect): 0-->No abnormality    Total (NIH Stroke Scale): 14     I reviewed the patient's new clinical results.  I reviewed the patient's new imaging results/reports including actual images and agree with reports.    XR Abdomen KUB    Result Date: 12/29/2023  Impression: Weighted tip feeding catheter tip overlies the duodenal/jejunal junction. Electronically Signed: Victor M Guevara MD  12/29/2023 10:34 PM EST  Workstation ID: [id number]    XR Chest 1 View    Result Date: 12/29/2023  Impression: 1. Mildly increased patchy left basilar interstitial changes, potentially a developing pneumonia. Increased peribronchial thickening potentially bronchitis. 2. Cardiomegaly and mild pulmonary venous hypertension. Electronically Signed: Solomon Carbone MD  12/29/2023 8:58 PM EST  Workstation ID: [id number]    CT Head Without Contrast    Result Date: 12/29/2023  Impression: No significant change with similar 3 cm right thalamic hemorrhage, 3 mm midline shift, and trace intraventricular blood products. Electronically Signed: Victor M Guevara MD  12/29/2023 7:50 PM EST  Workstation ID: [id number]    CT Head Without Contrast    Result Date: 12/29/2023  Impression: Stable CT head. Stable 3 cm right thalamic hemorrhage with right to left shift of 3 mm. Electronically Signed: Christoph Richardson MD  12/29/2023 1:12 AM EST  Workstation ID: [id number]    MRI Brain Without  Contrast    Result Date: 12/28/2023  Impression: Please note this examination is significantly motion degraded. Within these confines, there is overall similar size/volume of the right thalamic hemorrhage and small volume intraventricular blood products in the right ventricle. Similar 2-3 mm midline shift. No gross evidence of new acute intracranial process otherwise, though majority of sequences are nondiagnostic. Electronically Signed: Victor M Guevara MD  12/28/2023 11:19 PM EST  Workstation ID: [id number]      INFUSIONS  niCARdipine, 5-15 mg/hr, Last Rate: 7.5 mg/hr (12/30/23 1318)  sodium chloride, 75 mL/hr, Last Rate: 75 mL/hr (12/29/23 2319)        Results from last 7 days   Lab Units 12/30/23  0354 12/29/23  0430 12/27/23  2358   WBC 10*3/mm3 14.97* 19.30* 11.19*   HEMOGLOBIN g/dL 14.9 15.3 17.1   HEMATOCRIT % 44.6 47.1 50.0   PLATELETS 10*3/mm3 256 264 272     Results from last 7 days   Lab Units 12/30/23  0457 12/29/23  0430 12/28/23  1804 12/28/23  0612 12/27/23  2358   SODIUM mmol/L 139 139 142  142   < > 139  139   POTASSIUM mmol/L 4.2 4.6 4.3   < > 4.0  4.0   CHLORIDE mmol/L 106 106 107   < > 104  104   CO2 mmol/L 21.0* 23.0 18.0*   < > 22.0  22.0   BUN mg/dL 25* 20 21*   < > 12  12   GLUCOSE mg/dL 109* 117* 163*   < > 133*  133*   CREATININE mg/dL 1.40* 1.30* 1.53*   < > 1.28*  1.28*   MAGNESIUM mg/dL 1.9  --   --   --  2.3   CALCIUM mg/dL 8.8 9.4 9.6   < > 9.8  9.8   ALBUMIN g/dL 3.6  --   --   --  4.3    < > = values in this interval not displayed.         Results from last 7 days   Lab Units 12/29/23 2042   PH, ARTERIAL pH units 7.545*   PCO2, ARTERIAL mm Hg 25.3*   PO2 ART mm Hg 59.3*   FIO2 % 28       Diet, Tube Feeding Tube Feeding Formula: Peptamen Intense VHP (Peptide Based, Very High Protein)   /h  Patient doesn't have any tube feeding modular orders    Mechanical Ventilator:   Settings: Observed:                                                I reviewed the patient's  medications.    Assessment & Plan   ASSESSMENT/PLAN     Active Hospital Problems    Diagnosis     **Rt Thalamic ICH     Hypertension     Non-compliance     History of alcohol abuse        53-year-old male with a past medical history significant for hypertension and poor medical compliance.  He presented to the ER on 12/27 with sudden onset of left-sided weakness and headache.  Blood pressure in /139 initially.  CT of the head revealed a 3 cm right thalamic ICH.  CTA was unremarkable and was felt this was a hypertensive bleed.  He was admitted to the ICU for blood pressure control.  He has a history of alcoholism but reportedly has not been drinking recently    Since arrival in the ICU his neurologic status has tended to wax and wane.  His family has insisted that he has not drank any alcohol recently.  He is getting as needed benzodiazepines but he is not on scheduled benzodiazepines for this reason.  Trying to minimize sedation.  He developed a fever last night and there was a question of a possible left lower lobe infiltrate but this is subtle.  Cultures were obtained and Zosyn was initiated.  A feeding tube was placed.    Plan:    Goal SBP less than 140 mmHg  Cardene drip as needed  Oral Lopressor and lisinopril  Nicotine patch  Thiamine and folate  As needed benzodiazepines per CIWA scale  Zosyn empirically  Monitor fever curve.  This could certainly be central as well as infectious related  Initiate tube feeds     I discussed the patient's findings and my recommendations with patient, family, and nursing staff     Plan of care and goals reviewed with multidisciplinary team at daily rounds.    High level of risk due to:  Acute illness with threat to life or bodily function.    Edvin Child MD  Pulmonary and Critical Care Medicine  12/30/23 14:18 EST         Electronically signed by Edvin Child MD at 12/30/23 7732       Edvin Coffey MD at 12/30/23 1244          Neurology  Note    Patient:  Chano Rees    YOB: 1970    REFERRING PHYSICIAN:  Dr. Ziegler    CHIEF COMPLAINT:    AMS, left-sided weakness    HISTORY OF PRESENT ILLNESS:   The patient spiked a temp of 102 last night, CXR with question of LLL infiltrate, started on Zosyn. Afebrile but increasingly tachyphemic today. Agitated at times, got Ativan IV this am. O2 sat low 90s on 6L NC.    Past Medical History:  Past Medical History:   Diagnosis Date    Hypertension        Past Surgical History:  Past Surgical History:   Procedure Laterality Date    ANKLE SURGERY Left        Social History:   Social History     Socioeconomic History    Marital status: Unknown   Tobacco Use    Smoking status: Former     Types: Cigarettes    Smokeless tobacco: Current     Types: Chew   Vaping Use    Vaping Use: Never used   Substance and Sexual Activity    Alcohol use: Not Currently    Drug use: Not Currently    Sexual activity: Defer        Family History:   History reviewed. No pertinent family history.    Medications Prior to Admission:    Prior to Admission medications    Not on File       Allergies:  Shellfish-derived products      Review of system  Review of Systems    Vitals:    12/30/23 1230   BP: 156/94   Pulse: 89   Resp:    Temp:    SpO2: 96%       Physical exam  Physical Exam      Lab Results   Component Value Date    WBC 14.97 (H) 12/30/2023    HGB 14.9 12/30/2023    HCT 44.6 12/30/2023    MCV 91.4 12/30/2023     12/30/2023     Lab Results   Component Value Date    GLUCOSE 109 (H) 12/30/2023    BUN 25 (H) 12/30/2023    CREATININE 1.40 (H) 12/30/2023    BCR 17.9 12/30/2023    CO2 21.0 (L) 12/30/2023    CALCIUM 8.8 12/30/2023    ALBUMIN 3.6 12/30/2023    AST 28 12/27/2023    ALT 41 12/27/2023         Radiological Studies:   XR Abdomen KUB    Result Date: 12/29/2023  XR ABDOMEN KUB Date of Exam: 12/29/2023 9:30 PM EST Indication: NG placement Comparison: None available. Findings: Weighted tip feeding catheter  tip overlies the duodenal/jejunal junction. Visualized bowel gas pattern is nonobstructive.     Impression: Weighted tip feeding catheter tip overlies the duodenal/jejunal junction. Electronically Signed: Victor M Guevara MD  12/29/2023 10:34 PM EST  Workstation ID: [id number]    XR Chest 1 View    Result Date: 12/29/2023  XR CHEST 1 VW Date of Exam: 12/29/2023 8:21 PM EST Indication: fever Comparison: 12/27/2023 Findings: Cardiothymic silhouette appears enlarged as on the prior study. There is cephalization of the vasculature. Peribronchial thickening appears increased, and there is persistent, mildly increased patchy interstitial disease in the left lung base that may represent developing bronchitis or pneumonia. No dense lung consolidation, effusion or pneumothorax is seen.     Impression: 1. Mildly increased patchy left basilar interstitial changes, potentially a developing pneumonia. Increased peribronchial thickening potentially bronchitis. 2. Cardiomegaly and mild pulmonary venous hypertension. Electronically Signed: Solomon Carbone MD  12/29/2023 8:58 PM EST  Workstation ID: [id number]    CT Head Without Contrast    Result Date: 12/29/2023  CT HEAD WO CONTRAST Date of Exam: 12/29/2023 7:12 PM EST Indication: Neuro deficit, acute, stroke suspected AMS, aphasia, f/u ICH. Comparison: Head CT same date timed 00:28. Technique: Axial CT images were obtained of the head without contrast administration.  Automated exposure control and iterative construction methods were used. Findings: Similar size and morphology of the hemorrhage centered within the right thalamus measuring around 3 cm in maximum dimension. Similar thin rim of surrounding edema. Similar 3 mm of midline shift. Similar trace blood products layering within the posterior horns of the lateral ventricles. No new or enlarging intracranial hemorrhage. No new mass effect. Paranasal sinuses and mastoid air cells are clear. No acute or suspicious soft tissue or osseous  lesion.     Impression: No significant change with similar 3 cm right thalamic hemorrhage, 3 mm midline shift, and trace intraventricular blood products. Electronically Signed: Victor M Guevara MD  12/29/2023 7:50 PM EST  Workstation ID: [id number]    CT Head Without Contrast    Result Date: 12/29/2023  CT HEAD WO CONTRAST Date of Exam: 12/29/2023 12:22 AM EST Indication: Anisocoria Drowsiness. Comparison: CT scan of the head dated December 28, 2023 at 11:56 a.m. Technique: Axial CT images were obtained of the head without contrast administration.  Automated exposure control and iterative construction methods were used. Findings: There is been no significant change in the acute right thalamic hemorrhage. Maximum diameter is about 3 cm. Small amount of intraventricular hemorrhage is seen in the posterior horn the right lateral ventricle, unchanged. The degree of right to left shift is stable at about 3 mm. Iodinated contrast is again identified in the cerebral arteries. The paranasal sinuses are clear. There are no new areas of hemorrhage or abnormal extra-axial fluid collections.     Impression: Stable CT head. Stable 3 cm right thalamic hemorrhage with right to left shift of 3 mm. Electronically Signed: Christoph Richardson MD  12/29/2023 1:12 AM EST  Workstation ID: [id number]    MRI Brain Without Contrast    Result Date: 12/28/2023  MRI BRAIN WO CONTRAST Date of Exam: 12/28/2023 10:06 PM EST Indication: Stroke, follow up.  Comparison: Same day head CT. Technique:  Routine multiplanar/multisequence sequence images of the brain were obtained without contrast administration. Please note this examination is significantly motion degraded. Findings: Within the confines of motion artifact as above, similar size of the right thalamic hemorrhage measuring around 3 cm in maximum dimension. Similar small volume layering blood products in the right lateral ventricle. Ventricular caliber is unchanged. Similar 2 to 3 mm of midline shift. No  discrete evidence of restricted diffusion otherwise to suggest acute/subacute infarction.     Impression: Please note this examination is significantly motion degraded. Within these confines, there is overall similar size/volume of the right thalamic hemorrhage and small volume intraventricular blood products in the right ventricle. Similar 2-3 mm midline shift. No gross evidence of new acute intracranial process otherwise, though majority of sequences are nondiagnostic. Electronically Signed: Victor M Guevara MD  12/28/2023 11:19 PM EST  Workstation ID: [id number]    CT Head Without Contrast    Result Date: 12/28/2023  CT HEAD WO CONTRAST Date of Exam: 12/28/2023 11:55 AM EST Indication: Stroke, follow up. Comparison: 12/28/2023 Technique: Axial CT images were obtained of the head without contrast administration.  Automated exposure control and iterative construction methods were used. Findings: No significant change in right thalamic acute hemorrhage measuring 3 cm in longest diameter. There is redemonstration of a small amount of intraventricular hemorrhage within the posterior horn of the right lateral ventricle. Mild right to left midline shift is again identified of approximately 2 to 3 mm. Residual contrast within the cerebral arteries. Orbits paranasal sinuses and mastoid air cells are unremarkable. Vascular calcifications are identified.     Impression: 1. No significant change in right thalamic hemorrhage measuring around 3 cm in diameter with mild right to left midline shift. Electronically Signed: Tj Tyson MD  12/28/2023 12:12 PM EST  Workstation ID: [id number]    Adult Transthoracic Echo Complete W/ Cont if Necessary Per Protocol (With Agitated Saline)    Result Date: 12/28/2023    Left ventricular systolic function is normal. Calculated left ventricular EF = 55.6%   Left ventricular diastolic function is consistent with (grade I) impaired relaxation.   Saline test results are negative.     CT Head  Without Contrast    Result Date: 12/28/2023  CT HEAD WO CONTRAST Date of Exam: 12/28/2023 5:05 AM EST Indication: Stroke, follow up. Comparison: CT scan of the head dated December 27, 2023 at 2105 hours Technique: Axial CT images were obtained of the head without contrast administration.  Automated exposure control and iterative construction methods were used. Findings: There has been very slight increase in the size of the right thalamic hemorrhage which was 3 cm and is now 3.2 cm. There is new right posterior horn lateral ventricle intraventricular hemorrhage which is very mild measuring less than 3 mm in thickness and about 7 mm in transverse diameter layering in the posterior horn. There is mild right to left midline shift of about 2 mm. Contrast is seen in the middle cerebral arteries. There are no abnormal extra-axial fluid collections. There are no new hemorrhages.     Impression: Very slight increase in the size of the right thalamic hemorrhage 3.2 cm in diameter previously 3 cm with 2 mm of right to left midline shift and a trace amount of posterior horn right lateral ventricle intraventricular hemorrhage. Electronically Signed: Christoph Richardson MD  12/28/2023 5:33 AM EST  Workstation ID: [id number]    XR Chest 1 View    Result Date: 12/27/2023  XR CHEST 1 VW Date of Exam: 12/27/2023 9:49 PM EST Indication: Acute Stroke Protocol (onset < 12 hrs) Comparison: None available. Findings: Enlarged cardiac silhouette. Ectatic thoracic aorta. No focal consolidation. No pleural effusion. No pneumothorax. No acute osseous abnormality.     Impression: No focal consolidation. Enlarged cardiac silhouette and ectatic thoracic aorta. Electronically Signed: Victor M Guevara MD  12/27/2023 10:17 PM EST  Workstation ID: [id number]    CT Angiogram Head w AI Analysis of LVO    Result Date: 12/27/2023  CT ANGIOGRAM HEAD W AI ANALYSIS OF LVO, CT ANGIOGRAM NECK Date of Exam: 12/27/2023 9:10 PM EST Indication: Neuro deficit, acute stroke  suspected Neuro deficit, acute stroke suspected. Comparison: Same day noncontrast head CT. Technique: CTA of the head was performed after the uneventful intravenous administration of 75 mL Isovue-370. Reconstructed coronal and sagittal images were also obtained. In addition, a 3-D volume rendered image was created for interpretation. Automated  exposure control and iterative reconstruction methods were used. Findings: Vascular findings: Bilateral common carotid arteries are patent. Low-grade mixed atheromatous disease of the carotid bifurcations without significant stenosis by NASCET criteria. Bilateral cervical and intracranial portions of the internal carotid arteries are patent. Bilateral MCAs are patent. Bilateral ACAs are patent. Vertebral arteries are patent. Basilar artery is patent. Bilateral PCAs are patent. No evidence of dissection or aneurysm. Nonvascular findings: Visualized lung apices are grossly clear. Glandular tissue is symmetric and normal appearing. Aerodigestive structures are within normal limits. No acute or suspicious soft tissue or osseous lesion. Right thalamic hemorrhage as discussed on noncontrast head CT.     Impression: No evidence of large vessel occlusion, flow-limiting stenosis, dissection, or aneurysm. Electronically Signed: Victor M Guevara MD  12/27/2023 9:46 PM EST  Workstation ID: [id number]    CT Angiogram Neck    Result Date: 12/27/2023  CT ANGIOGRAM HEAD W AI ANALYSIS OF LVO, CT ANGIOGRAM NECK Date of Exam: 12/27/2023 9:10 PM EST Indication: Neuro deficit, acute stroke suspected Neuro deficit, acute stroke suspected. Comparison: Same day noncontrast head CT. Technique: CTA of the head was performed after the uneventful intravenous administration of 75 mL Isovue-370. Reconstructed coronal and sagittal images were also obtained. In addition, a 3-D volume rendered image was created for interpretation. Automated  exposure control and iterative reconstruction methods were used.  Findings: Vascular findings: Bilateral common carotid arteries are patent. Low-grade mixed atheromatous disease of the carotid bifurcations without significant stenosis by NASCET criteria. Bilateral cervical and intracranial portions of the internal carotid arteries are patent. Bilateral MCAs are patent. Bilateral ACAs are patent. Vertebral arteries are patent. Basilar artery is patent. Bilateral PCAs are patent. No evidence of dissection or aneurysm. Nonvascular findings: Visualized lung apices are grossly clear. Glandular tissue is symmetric and normal appearing. Aerodigestive structures are within normal limits. No acute or suspicious soft tissue or osseous lesion. Right thalamic hemorrhage as discussed on noncontrast head CT.     Impression: No evidence of large vessel occlusion, flow-limiting stenosis, dissection, or aneurysm. Electronically Signed: Victor M Guevara MD  12/27/2023 9:46 PM EST  Workstation ID: [id number]    CT Head Without Contrast Stroke Protocol    Result Date: 12/27/2023  CT HEAD WO CONTRAST STROKE PROTOCOL Date of Exam: 12/27/2023 9:03 PM EST Indication: Neuro deficit, acute, stroke suspected Neuro deficit, acute stroke suspected. Comparison: None available. Technique: Axial CT images were obtained of the head without contrast administration.  Reconstructed coronal images were also obtained. Automated exposure control and iterative construction methods were used. Scan Time: 2105 Results discussed with the stroke coordinator via telephone by Victor M Guevara MD at 2106. Findings: 3.0 x 3.0 x 2.3 cm parenchymal hemorrhage centered in the right thalamus. Mild surrounding edema with mild regional mass effect without overt midline. No evidence of intraventricular blood products. Lacunar infarct in the left basal ganglia. No extra-axial collection. Ventricular system is nondilated. Basal cisterns are patent. No acute or suspicious soft tissue or osseous lesion. Paranasal sinuses and mastoid air cells are  well aerated.     Impression: Right thalamic hemorrhage measuring up to 3 cm. Electronically Signed: Victor M Guevara MD  12/27/2023 9:36 PM EST  Workstation ID: [id number]       During this visit the following were done:  Labs Reviewed [x]    Labs Ordered []    Radiology Reports Reviewed [x]    Radiology Ordered []    EKG, echo, and/or stress test reviewed [x]    EEG results reviewed  []    EEG reviewed and interpreted per myself   []    Discussed case with neurointerventionalist or neuroradiologist []    Referring Provider Records Reviewed []    ER Records Reviewed []    Hospital Records Reviewed []    History Obtained From Family [x]    Radiological images view and Interpreted per myself [x]    Case Discussed with referring provider []     Decision to obtain and request outside records  []        Assessment and Plan     Acute right BG ICH with mild mass effect 22 HTN, noncompliant with meds. F/U CTH stable. TME with agitation, question of alcohol abuse. He likely has DANIELLE given body habitus. Fever last night, likely aspiration pneumonia, now on Zosyn.   - ICU observation.   - BP<140/90.   - TF.   - Stopped 3% NS.   - CIWA.   - Thiamine.   - F/U CTH in am.   - ST, PT, OT.              Electronically signed by Edvin Coffey MD on 12/30/2023 at 12:44 EST        Electronically signed by Edvin Coffey MD at 12/30/23 2142       Carly Delgado APRN at 12/29/23 2030          Called by RN due to patient has new fever 102.7, WBC 19.  CXR showed LLL patchy airspace disease.  Zosyn was started.  Acetaminophen was ordered.  Cultures ordered.      Patient was also tachycardic and hypertensive with worsening AMS / agitation.  RN notified Neurology who wanted CTH.  Imaging was unchanged to compared to priors.  He was maxed on Cardene drip. Hypertension was resistant to additional IVPs of 40 mg IV Hydralazine and 20 mg IV Labetalol.  He does have CIWA protocol ordered.  There was question that he may  "still be drinking alcohol.  He was given Ativan and Versed per CIWA protocol with some improvement in hypertension and agitation.      UDS rechecked tonight that was only + benzos.    Electronically signed by AURELIANO Blanchard, 12/30/23, 6:30 AM EST.      Electronically signed by Carly Delgado APRN at 12/30/23 0631       Edvin Child MD at 12/29/23 1210            INTENSIVIST NOTE     Hospital Day: 2    Mr. Chano Rees, 53 y.o. male is followed for:   ICH       SUBJECTIVE     Interval history:    Maximum temperature 98.7.  Fluid balance +1.4 L.  Neurologic status waxes and wanes.  Family at bedside.    The patient's relevant past medical, surgical and social history were reviewed and updated in Epic as appropriate.       OBJECTIVE     Vital Sign Min/Max for last 24 hours  Temp  Min: 97.7 °F (36.5 °C)  Max: 98.7 °F (37.1 °C)   BP  Min: 113/91  Max: 153/86   Pulse  Min: 80  Max: 110   Resp  Min: 20  Max: 24   SpO2  Min: 90 %  Max: 97 %   No data recorded   No data recorded      Intake/Output Summary (Last 24 hours) at 12/29/2023 1210  Last data filed at 12/29/2023 1002  Gross per 24 hour   Intake 2547 ml   Output 1100 ml   Net 1447 ml      Flowsheet Rows      Flowsheet Row First Filed Value   Admission Height 177.8 cm (70\") Documented at 12/27/2023 2152   Admission Weight 125 kg (275 lb) Documented at 12/27/2023 2152               12/27/23  2152 12/28/23  0101   Weight: 125 kg (275 lb) 126 kg (278 lb 7.1 oz)            Objective:  General Appearance:  In no acute distress.    Vital signs: (most recent): Blood pressure 140/87, pulse 94, temperature 98.7 °F (37.1 °C), temperature source Oral, resp. rate 24, height 180.3 cm (71\"), weight 126 kg (278 lb 7.1 oz), SpO2 91%.    HEENT: Normal HEENT exam.    Lungs:  Normal effort and normal respiratory rate.  Breath sounds clear to auscultation.  He is not in respiratory distress.  No rales, wheezes or rhonchi.    Heart: " Normal rate.  Regular rhythm.  S1 normal and S2 normal.  No murmur, gallop or friction rub.   Chest: Symmetric chest wall expansion.   Abdomen: Abdomen is soft and non-distended.  Bowel sounds are normal.   There is no abdominal tenderness.   There is no mass. There is no splenomegaly. There is no hepatomegaly.   Extremities: There is no deformity or dependent edema.    Pupils:  Pupils are equal, round, and reactive to light.    Skin:  Warm and dry.            Interval:  (handoff)  1a. Level of Consciousness: 0-->Alert, keenly responsive  1b. LOC Questions: 0-->Answers both questions correctly  1c. LOC Commands: 0-->Performs both tasks correctly  2. Best Gaze: 1-->Partial gaze palsy, gaze is abnormal in one or both eyes, but forced deviation or total gaze paresis is not present  3. Visual: 1-->Partial hemianopia  4. Facial Palsy: 2-->Partial paralysis (total or near-total paralysis of lower face)  5a. Motor Arm, Left: 2-->Some effort against gravity, limb cannot get to or maintain (if cued) 90 (or 45) degrees, drifts down to bed, but has some effort against gravity  5b. Motor Arm, Right: 0-->No drift, limb holds 90 (or 45) degrees for full 10 secs  6a. Motor Leg, Left: 1-->Drift, leg falls by the end of the 5-sec period but does not hit bed  6b. Motor Leg, Right: 0-->No drift, leg holds 30 degree position for full 5 secs  7. Limb Ataxia: 1-->Present in one limb  8. Sensory: 1-->Mild-to-moderate sensory loss, patient feels pinprick is less sharp or is dull on the affected side, or there is a loss of superficial pain with pinprick, but patient is aware of being touched  9. Best Language: 0-->No aphasia, normal  10. Dysarthria: 1-->Mild-to-moderate dysarthria, patient slurs at least some words and, at worst, can be understood with some difficulty  11. Extinction and Inattention (formerly Neglect): 2-->Profound lamberto-inattention/extinction more than 1 modality    Total (NIH Stroke Scale): 12     I reviewed the patient's  new clinical results.  I reviewed the patient's new imaging results/reports including actual images and agree with reports.    CT Head Without Contrast    Result Date: 12/29/2023  Impression: Stable CT head. Stable 3 cm right thalamic hemorrhage with right to left shift of 3 mm. Electronically Signed: Christoph Richardson MD  12/29/2023 1:12 AM EST  Workstation ID: [id number]    MRI Brain Without Contrast    Result Date: 12/28/2023  Impression: Please note this examination is significantly motion degraded. Within these confines, there is overall similar size/volume of the right thalamic hemorrhage and small volume intraventricular blood products in the right ventricle. Similar 2-3 mm midline shift. No gross evidence of new acute intracranial process otherwise, though majority of sequences are nondiagnostic. Electronically Signed: Victor M Guevara MD  12/28/2023 11:19 PM EST  Workstation ID: [id number]    CT Head Without Contrast    Result Date: 12/28/2023  Impression: 1. No significant change in right thalamic hemorrhage measuring around 3 cm in diameter with mild right to left midline shift. Electronically Signed: Tj Tyson MD  12/28/2023 12:12 PM EST  Workstation ID: [id number]    CT Head Without Contrast    Result Date: 12/28/2023  Impression: Very slight increase in the size of the right thalamic hemorrhage 3.2 cm in diameter previously 3 cm with 2 mm of right to left midline shift and a trace amount of posterior horn right lateral ventricle intraventricular hemorrhage. Electronically Signed: Christoph Richardson MD  12/28/2023 5:33 AM EST  Workstation ID: [id number]    XR Chest 1 View    Result Date: 12/27/2023  Impression: No focal consolidation. Enlarged cardiac silhouette and ectatic thoracic aorta. Electronically Signed: Victor M Guevara MD  12/27/2023 10:17 PM EST  Workstation ID: [id number]    CT Angiogram Head w AI Analysis of LVO    Result Date: 12/27/2023  Impression: No evidence of large vessel occlusion, flow-limiting stenosis,  dissection, or aneurysm. Electronically Signed: Victor M Guevara MD  12/27/2023 9:46 PM EST  Workstation ID: [id number]    CT Angiogram Neck    Result Date: 12/27/2023  Impression: No evidence of large vessel occlusion, flow-limiting stenosis, dissection, or aneurysm. Electronically Signed: Victor M Guevara MD  12/27/2023 9:46 PM EST  Workstation ID: [id number]    CT Head Without Contrast Stroke Protocol    Result Date: 12/27/2023  Impression: Right thalamic hemorrhage measuring up to 3 cm. Electronically Signed: Victor M Guevara MD  12/27/2023 9:36 PM EST  Workstation ID: [id number]      INFUSIONS  niCARdipine, 5-15 mg/hr, Last Rate: 12.5 mg/hr (12/29/23 1050)        Results from last 7 days   Lab Units 12/29/23  0430 12/27/23  2358 12/27/23  2117   WBC 10*3/mm3 19.30* 11.19* 9.71   HEMOGLOBIN g/dL 15.3 17.1 16.3   HEMATOCRIT % 47.1 50.0 49.2   PLATELETS 10*3/mm3 264 272 249     Results from last 7 days   Lab Units 12/29/23  0430 12/28/23  1804 12/28/23  0612 12/27/23  2358   SODIUM mmol/L 139 142  142 139 139  139   POTASSIUM mmol/L 4.6 4.3 4.6 4.0  4.0   CHLORIDE mmol/L 106 107 105 104  104   CO2 mmol/L 23.0 18.0* 17.0* 22.0  22.0   BUN mg/dL 20 21* 17 12  12   GLUCOSE mg/dL 117* 163* 165* 133*  133*   CREATININE mg/dL 1.30* 1.53* 1.44* 1.28*  1.28*   MAGNESIUM mg/dL  --   --   --  2.3   CALCIUM mg/dL 9.4 9.6 9.7 9.8  9.8   ALBUMIN g/dL  --   --   --  4.3               Patient isn't on Tube Feeding   /h  Patient doesn't have any tube feeding modular orders    Mechanical Ventilator:   Settings: Observed:                                                I reviewed the patient's medications.    Assessment & Plan   ASSESSMENT/PLAN     Active Hospital Problems    Diagnosis     **Rt Thalamic ICH     Hypertension     Non-compliance     History of alcohol abuse        53-year-old male with a past medical history significant for hypertension and poor medical compliance.  He presented to the ER on 12/27 with sudden onset of  left-sided weakness and headache.  Blood pressure in /139 initially.  CT of the head revealed a 3 cm right thalamic ICH.  CTA was unremarkable and was felt this was a hypertensive bleed.  He was admitted to the ICU for blood pressure control.  He has a history of alcoholism but reportedly has not been drinking recently    Neurologic status today waxes and wanes significantly.  He can be awake and alert and eating and then be asleep and difficult to arouse shortly thereafter and then be alert again another hour later.  Head CT stable with 3 cm right thalamic hemorrhage with a right to left shift of 3 8 mm.  On a Cardene drip for blood pressure control within parameters.  Hypertonic saline has been discontinued.  Creatinine roughly at recent baseline.    Plan:    Goal SBP less than 140 mmHg  Cardene drip as needed  Oral Lopressor and lisinopril  Nicotine patch  Thiamine and folate  Family insist that he has not been drinking alcohol recently.  Will leave the as needed CIWA scale in place but not schedule any benzodiazepines     I discussed the patient's findings and my recommendations with patient, family, and nursing staff     Plan of care and goals reviewed with multidisciplinary team at daily rounds.    High level of risk due to:  Acute illness with threat to life or bodily function.    Edvin Child MD  Pulmonary and Critical Care Medicine  12/29/23 12:10 EST         Electronically signed by Edvin Child MD at 12/29/23 1214       Edvin Coffey MD at 12/29/23 1201          Neurology Note    Patient:  Chano Rees    YOB: 1970    REFERRING PHYSICIAN:  Dr. Ziegler    CHIEF COMPLAINT:    Left-sided weakness    HISTORY OF PRESENT ILLNESS:   The patient has been intermittently agitated, getting prn Versed. He has reported daily alcohol abuse but the family denies, state that he is being goofy. NIHSS 12 this am.    Past Medical History:  Past Medical History:    Diagnosis Date    Hypertension        Past Surgical History:  Past Surgical History:   Procedure Laterality Date    ANKLE SURGERY Left        Social History:   Social History     Socioeconomic History    Marital status: Unknown   Tobacco Use    Smoking status: Former     Types: Cigarettes    Smokeless tobacco: Current     Types: Chew   Vaping Use    Vaping Use: Never used   Substance and Sexual Activity    Alcohol use: Not Currently    Drug use: Not Currently    Sexual activity: Defer        Family History:   History reviewed. No pertinent family history.    Medications Prior to Admission:    Prior to Admission medications    Not on File       Allergies:  Shellfish-derived products      Review of system  Review of Systems   Unable to perform ROS: Mental status change       Vitals:    12/29/23 1145   BP: 140/87   Pulse: 94   Resp:    Temp:    SpO2: 91%       Physical exam  Physical Exam  Eyes:      Extraocular Movements: Extraocular movements intact.      Pupils: Pupils are equal, round, and reactive to light.   Cardiovascular:      Rate and Rhythm: Normal rate and regular rhythm.   Pulmonary:      Effort: Pulmonary effort is normal.   Neurological:      Deep Tendon Reflexes: Babinski sign absent on the right side. Babinski sign present on the left side.      Comments: Drowsy, alerts to voice, oriented to self, restless, left facial droop, moves right side well, left side some w/o gravity.           Lab Results   Component Value Date    WBC 19.30 (H) 12/29/2023    HGB 15.3 12/29/2023    HCT 47.1 12/29/2023    MCV 90.9 12/29/2023     12/29/2023     Lab Results   Component Value Date    GLUCOSE 117 (H) 12/29/2023    BUN 20 12/29/2023    CREATININE 1.30 (H) 12/29/2023    BCR 15.4 12/29/2023    CO2 23.0 12/29/2023    CALCIUM 9.4 12/29/2023    ALBUMIN 4.3 12/27/2023    AST 28 12/27/2023    ALT 41 12/27/2023         Radiological Studies:   CT Head Without Contrast    Result Date: 12/29/2023  CT HEAD WO CONTRAST  Date of Exam: 12/29/2023 12:22 AM EST Indication: Anisocoria Drowsiness. Comparison: CT scan of the head dated December 28, 2023 at 11:56 a.m. Technique: Axial CT images were obtained of the head without contrast administration.  Automated exposure control and iterative construction methods were used. Findings: There is been no significant change in the acute right thalamic hemorrhage. Maximum diameter is about 3 cm. Small amount of intraventricular hemorrhage is seen in the posterior horn the right lateral ventricle, unchanged. The degree of right to left shift is stable at about 3 mm. Iodinated contrast is again identified in the cerebral arteries. The paranasal sinuses are clear. There are no new areas of hemorrhage or abnormal extra-axial fluid collections.     Impression: Stable CT head. Stable 3 cm right thalamic hemorrhage with right to left shift of 3 mm. Electronically Signed: Christoph Richardson MD  12/29/2023 1:12 AM EST  Workstation ID: [id number]    MRI Brain Without Contrast    Result Date: 12/28/2023  MRI BRAIN WO CONTRAST Date of Exam: 12/28/2023 10:06 PM EST Indication: Stroke, follow up.  Comparison: Same day head CT. Technique:  Routine multiplanar/multisequence sequence images of the brain were obtained without contrast administration. Please note this examination is significantly motion degraded. Findings: Within the confines of motion artifact as above, similar size of the right thalamic hemorrhage measuring around 3 cm in maximum dimension. Similar small volume layering blood products in the right lateral ventricle. Ventricular caliber is unchanged. Similar 2 to 3 mm of midline shift. No discrete evidence of restricted diffusion otherwise to suggest acute/subacute infarction.     Impression: Please note this examination is significantly motion degraded. Within these confines, there is overall similar size/volume of the right thalamic hemorrhage and small volume intraventricular blood products in the  right ventricle. Similar 2-3 mm midline shift. No gross evidence of new acute intracranial process otherwise, though majority of sequences are nondiagnostic. Electronically Signed: Victor M Guevara MD  12/28/2023 11:19 PM EST  Workstation ID: [id number]    CT Head Without Contrast    Result Date: 12/28/2023  CT HEAD WO CONTRAST Date of Exam: 12/28/2023 11:55 AM EST Indication: Stroke, follow up. Comparison: 12/28/2023 Technique: Axial CT images were obtained of the head without contrast administration.  Automated exposure control and iterative construction methods were used. Findings: No significant change in right thalamic acute hemorrhage measuring 3 cm in longest diameter. There is redemonstration of a small amount of intraventricular hemorrhage within the posterior horn of the right lateral ventricle. Mild right to left midline shift is again identified of approximately 2 to 3 mm. Residual contrast within the cerebral arteries. Orbits paranasal sinuses and mastoid air cells are unremarkable. Vascular calcifications are identified.     Impression: 1. No significant change in right thalamic hemorrhage measuring around 3 cm in diameter with mild right to left midline shift. Electronically Signed: Tj Tyson MD  12/28/2023 12:12 PM EST  Workstation ID: [id number]    Adult Transthoracic Echo Complete W/ Cont if Necessary Per Protocol (With Agitated Saline)    Result Date: 12/28/2023    Left ventricular systolic function is normal. Calculated left ventricular EF = 55.6%   Left ventricular diastolic function is consistent with (grade I) impaired relaxation.   Saline test results are negative.     CT Head Without Contrast    Result Date: 12/28/2023  CT HEAD WO CONTRAST Date of Exam: 12/28/2023 5:05 AM EST Indication: Stroke, follow up. Comparison: CT scan of the head dated December 27, 2023 at 2105 hours Technique: Axial CT images were obtained of the head without contrast administration.  Automated exposure control and  iterative construction methods were used. Findings: There has been very slight increase in the size of the right thalamic hemorrhage which was 3 cm and is now 3.2 cm. There is new right posterior horn lateral ventricle intraventricular hemorrhage which is very mild measuring less than 3 mm in thickness and about 7 mm in transverse diameter layering in the posterior horn. There is mild right to left midline shift of about 2 mm. Contrast is seen in the middle cerebral arteries. There are no abnormal extra-axial fluid collections. There are no new hemorrhages.     Impression: Very slight increase in the size of the right thalamic hemorrhage 3.2 cm in diameter previously 3 cm with 2 mm of right to left midline shift and a trace amount of posterior horn right lateral ventricle intraventricular hemorrhage. Electronically Signed: Christoph Richardson MD  12/28/2023 5:33 AM EST  Workstation ID: [id number]    XR Chest 1 View    Result Date: 12/27/2023  XR CHEST 1 VW Date of Exam: 12/27/2023 9:49 PM EST Indication: Acute Stroke Protocol (onset < 12 hrs) Comparison: None available. Findings: Enlarged cardiac silhouette. Ectatic thoracic aorta. No focal consolidation. No pleural effusion. No pneumothorax. No acute osseous abnormality.     Impression: No focal consolidation. Enlarged cardiac silhouette and ectatic thoracic aorta. Electronically Signed: Victor M Guevara MD  12/27/2023 10:17 PM EST  Workstation ID: [id number]    CT Angiogram Head w AI Analysis of LVO    Result Date: 12/27/2023  CT ANGIOGRAM HEAD W AI ANALYSIS OF LVO, CT ANGIOGRAM NECK Date of Exam: 12/27/2023 9:10 PM EST Indication: Neuro deficit, acute stroke suspected Neuro deficit, acute stroke suspected. Comparison: Same day noncontrast head CT. Technique: CTA of the head was performed after the uneventful intravenous administration of 75 mL Isovue-370. Reconstructed coronal and sagittal images were also obtained. In addition, a 3-D volume rendered image was created for  interpretation. Automated  exposure control and iterative reconstruction methods were used. Findings: Vascular findings: Bilateral common carotid arteries are patent. Low-grade mixed atheromatous disease of the carotid bifurcations without significant stenosis by NASCET criteria. Bilateral cervical and intracranial portions of the internal carotid arteries are patent. Bilateral MCAs are patent. Bilateral ACAs are patent. Vertebral arteries are patent. Basilar artery is patent. Bilateral PCAs are patent. No evidence of dissection or aneurysm. Nonvascular findings: Visualized lung apices are grossly clear. Glandular tissue is symmetric and normal appearing. Aerodigestive structures are within normal limits. No acute or suspicious soft tissue or osseous lesion. Right thalamic hemorrhage as discussed on noncontrast head CT.     Impression: No evidence of large vessel occlusion, flow-limiting stenosis, dissection, or aneurysm. Electronically Signed: Victor M Guevara MD  12/27/2023 9:46 PM EST  Workstation ID: [id number]    CT Angiogram Neck    Result Date: 12/27/2023  CT ANGIOGRAM HEAD W AI ANALYSIS OF LVO, CT ANGIOGRAM NECK Date of Exam: 12/27/2023 9:10 PM EST Indication: Neuro deficit, acute stroke suspected Neuro deficit, acute stroke suspected. Comparison: Same day noncontrast head CT. Technique: CTA of the head was performed after the uneventful intravenous administration of 75 mL Isovue-370. Reconstructed coronal and sagittal images were also obtained. In addition, a 3-D volume rendered image was created for interpretation. Automated  exposure control and iterative reconstruction methods were used. Findings: Vascular findings: Bilateral common carotid arteries are patent. Low-grade mixed atheromatous disease of the carotid bifurcations without significant stenosis by NASCET criteria. Bilateral cervical and intracranial portions of the internal carotid arteries are patent. Bilateral MCAs are patent. Bilateral ACAs are  patent. Vertebral arteries are patent. Basilar artery is patent. Bilateral PCAs are patent. No evidence of dissection or aneurysm. Nonvascular findings: Visualized lung apices are grossly clear. Glandular tissue is symmetric and normal appearing. Aerodigestive structures are within normal limits. No acute or suspicious soft tissue or osseous lesion. Right thalamic hemorrhage as discussed on noncontrast head CT.     Impression: No evidence of large vessel occlusion, flow-limiting stenosis, dissection, or aneurysm. Electronically Signed: Victor M Guevara MD  12/27/2023 9:46 PM EST  Workstation ID: [id number]    CT Head Without Contrast Stroke Protocol    Result Date: 12/27/2023  CT HEAD WO CONTRAST STROKE PROTOCOL Date of Exam: 12/27/2023 9:03 PM EST Indication: Neuro deficit, acute, stroke suspected Neuro deficit, acute stroke suspected. Comparison: None available. Technique: Axial CT images were obtained of the head without contrast administration.  Reconstructed coronal images were also obtained. Automated exposure control and iterative construction methods were used. Scan Time: 2105 Results discussed with the stroke coordinator via telephone by Victor M Guevara MD at 2106. Findings: 3.0 x 3.0 x 2.3 cm parenchymal hemorrhage centered in the right thalamus. Mild surrounding edema with mild regional mass effect without overt midline. No evidence of intraventricular blood products. Lacunar infarct in the left basal ganglia. No extra-axial collection. Ventricular system is nondilated. Basal cisterns are patent. No acute or suspicious soft tissue or osseous lesion. Paranasal sinuses and mastoid air cells are well aerated.     Impression: Right thalamic hemorrhage measuring up to 3 cm. Electronically Signed: Victor M Guevara MD  12/27/2023 9:36 PM EST  Workstation ID: [id number]       During this visit the following were done:  Labs Reviewed [x]    Labs Ordered []    Radiology Reports Reviewed [x]    Radiology Ordered []    EKG,  echo, and/or stress test reviewed [x]    EEG results reviewed  []    EEG reviewed and interpreted per myself   []    Discussed case with neurointerventionalist or neuroradiologist []    Referring Provider Records Reviewed []    ER Records Reviewed []    Hospital Records Reviewed []    History Obtained From Family [x]    Radiological images view and Interpreted per myself [x]    Case Discussed with referring provider []     Decision to obtain and request outside records  []        Assessment and Plan     Acute right BG ICH with mild mass effect 22 HTN, noncompliant with meds. F/U CTH stable. TME with agitation, question of alcohol abuse. He likely has DANIELLE given body habitus.   - ICU observation overnight.   - BP<140/90.   - O2 NC at night.   - Stopped 3% NS.   - CIWA.   - Thiamine.   - ST, PT, OT.              Electronically signed by Edvin Coffey MD on 12/29/2023 at 12:02 EST        Electronically signed by Edvni Coffey MD at 12/29/23 3668       Edvin Child MD at 12/28/23 1403            INTENSIVIST NOTE     Hospital Day: 1    Mr. Chano Rees, 53 y.o. male is followed for:   ICH       SUBJECTIVE     Interval history:    NIH 16 this morning.  Afebrile.  Fluid balance slightly positive.  Remains on a Cardene drip    Oral antihypertensives started.  CT head without much change this morning.    The patient's relevant past medical, surgical and social history were reviewed and updated in Epic as appropriate.       OBJECTIVE     Vital Sign Min/Max for last 24 hours  Temp  Min: 97.9 °F (36.6 °C)  Max: 98.4 °F (36.9 °C)   BP  Min: 106/57  Max: 226/119   Pulse  Min: 79  Max: 105   Resp  Min: 18  Max: 22   SpO2  Min: 87 %  Max: 99 %   No data recorded   Weight  Min: 125 kg (275 lb)  Max: 126 kg (278 lb 7.1 oz)      Intake/Output Summary (Last 24 hours) at 12/28/2023 1403  Last data filed at 12/28/2023 1300  Gross per 24 hour   Intake 2704.8 ml   Output 2250 ml   Net 454.8 ml     "  Flowsheet Rows      Flowsheet Row First Filed Value   Admission Height 177.8 cm (70\") Documented at 12/27/2023 2152   Admission Weight 125 kg (275 lb) Documented at 12/27/2023 2152 12/27/23 2152 12/28/23  0101   Weight: 125 kg (275 lb) 126 kg (278 lb 7.1 oz)            Objective:  General Appearance:  In no acute distress.    Vital signs: (most recent): Blood pressure 152/86, pulse 100, temperature 98 °F (36.7 °C), temperature source Oral, resp. rate 18, height 180.3 cm (71\"), weight 126 kg (278 lb 7.1 oz), SpO2 94%.    HEENT: Normal HEENT exam.    Lungs:  Normal effort and normal respiratory rate.  Breath sounds clear to auscultation.  He is not in respiratory distress.  No rales, wheezes or rhonchi.    Heart: Normal rate.  Regular rhythm.  S1 normal and S2 normal.  No murmur, gallop or friction rub.   Chest: Symmetric chest wall expansion.   Abdomen: Abdomen is soft and non-distended.  Bowel sounds are normal.   There is no abdominal tenderness.   There is no mass. There is no splenomegaly. There is no hepatomegaly.   Extremities: There is no deformity or dependent edema.    Pupils:  Pupils are equal, round, and reactive to light.    Skin:  Warm and dry.            Interval: baseline  1a. Level of Consciousness: 1-->Not alert, but arousable by minor stimulation to obey, answer, or respond  1b. LOC Questions: 0-->Answers both questions correctly  1c. LOC Commands: 0-->Performs both tasks correctly  2. Best Gaze: 1-->Partial gaze palsy, gaze is abnormal in one or both eyes, but forced deviation or total gaze paresis is not present  3. Visual: 1-->Partial hemianopia  4. Facial Palsy: 3-->Complete paralysis of one or both sides (absence of facial movement in the upper and lower face)  5a. Motor Arm, Left: 3-->No effort against gravity, limb falls  5b. Motor Arm, Right: 0-->No drift, limb holds 90 (or 45) degrees for full 10 secs  6a. Motor Leg, Left: 1-->Drift, leg falls by the end of the 5-sec period " but does not hit bed  6b. Motor Leg, Right: 0-->No drift, leg holds 30 degree position for full 5 secs  7. Limb Ataxia: 2-->Present in two limbs  8. Sensory: 1-->Mild-to-moderate sensory loss, patient feels pinprick is less sharp or is dull on the affected side, or there is a loss of superficial pain with pinprick, but patient is aware of being touched  9. Best Language: 0-->No aphasia, normal  10. Dysarthria: 1-->Mild-to-moderate dysarthria, patient slurs at least some words and, at worst, can be understood with some difficulty  11. Extinction and Inattention (formerly Neglect): 2-->Profound lamberto-inattention/extinction more than 1 modality    Total (NIH Stroke Scale): 16     I reviewed the patient's new clinical results.  I reviewed the patient's new imaging results/reports including actual images and agree with reports.    CT Head Without Contrast    Result Date: 12/28/2023  Impression: 1. No significant change in right thalamic hemorrhage measuring around 3 cm in diameter with mild right to left midline shift. Electronically Signed: Tj Tyson MD  12/28/2023 12:12 PM EST  Workstation ID: [id number]    CT Head Without Contrast    Result Date: 12/28/2023  Impression: Very slight increase in the size of the right thalamic hemorrhage 3.2 cm in diameter previously 3 cm with 2 mm of right to left midline shift and a trace amount of posterior horn right lateral ventricle intraventricular hemorrhage. Electronically Signed: Christoph Richardson MD  12/28/2023 5:33 AM EST  Workstation ID: [id number]    XR Chest 1 View    Result Date: 12/27/2023  Impression: No focal consolidation. Enlarged cardiac silhouette and ectatic thoracic aorta. Electronically Signed: Victor M Guevara MD  12/27/2023 10:17 PM EST  Workstation ID: [id number]    CT Angiogram Head w AI Analysis of LVO    Result Date: 12/27/2023  Impression: No evidence of large vessel occlusion, flow-limiting stenosis, dissection, or aneurysm. Electronically Signed: Victor M Guevara MD   12/27/2023 9:46 PM EST  Workstation ID: [id number]    CT Angiogram Neck    Result Date: 12/27/2023  Impression: No evidence of large vessel occlusion, flow-limiting stenosis, dissection, or aneurysm. Electronically Signed: Victor M Guevara MD  12/27/2023 9:46 PM EST  Workstation ID: [id number]    CT Head Without Contrast Stroke Protocol    Result Date: 12/27/2023  Impression: Right thalamic hemorrhage measuring up to 3 cm. Electronically Signed: Victor M Guevara MD  12/27/2023 9:36 PM EST  Workstation ID: [id number]      INFUSIONS  niCARdipine, 5-15 mg/hr, Last Rate: 10 mg/hr (12/28/23 1334)  sodium chloride, 20 mL/hr, Last Rate: 20 mL/hr (12/28/23 0001)        Results from last 7 days   Lab Units 12/27/23  2358 12/27/23 2117 12/27/23 2107   WBC 10*3/mm3 11.19* 9.71  --    HEMOGLOBIN g/dL 17.1 16.3  --    HEMOGLOBIN, POC g/dL  --   --  17.0   HEMATOCRIT % 50.0 49.2  --    HEMATOCRIT POC %  --   --  50   PLATELETS 10*3/mm3 272 249  --      Results from last 7 days   Lab Units 12/28/23  0612 12/27/23 2358 12/27/23 2107   SODIUM mmol/L 139 139  139  --    POTASSIUM mmol/L 4.6 4.0  4.0  --    CHLORIDE mmol/L 105 104  104  --    CO2 mmol/L 17.0* 22.0  22.0  --    BUN mg/dL 17 12  12  --    GLUCOSE mg/dL 165* 133*  133*  --    CREATININE mg/dL 1.44* 1.28*  1.28* 1.20   MAGNESIUM mg/dL  --  2.3  --    CALCIUM mg/dL 9.7 9.8  9.8  --    ALBUMIN g/dL  --  4.3  --                Patient isn't on Tube Feeding   /h  Patient doesn't have any tube feeding modular orders    Mechanical Ventilator:   Settings: Observed:                                                I reviewed the patient's medications.    Assessment & Plan   ASSESSMENT/PLAN     Active Hospital Problems    Diagnosis     **Rt Thalamic ICH     Hypertension     Non-compliance     History of alcohol abuse        53-year-old male with a past medical history significant for hypertension and poor medical compliance.  He presented to the ER on 12/27 with sudden onset of  left-sided weakness and headache.  Blood pressure in /139 initially.  CT of the head revealed a 3 cm right thalamic ICH.  CTA was unremarkable and was felt this was a hypertensive bleed.  He was admitted to the ICU for blood pressure control.  He has a history of alcoholism but reportedly has not been drinking recently    CT head this morning without significant change in the right thalamic hemorrhage with some mild midline shift.  Remains on hypertonic saline.  NIH is 16    Plan:    Goal SBP less than 140 mmHg  Cardene drip as needed  Oral Lopressor and lisinopril  3% saline per neurology stroke service  Brain MRI     I discussed the patient's findings and my recommendations with patient and nursing staff     Plan of care and goals reviewed with multidisciplinary team at daily rounds.    High level of risk due to:  Acute illness with threat to life or bodily function.    Edvin Child MD  Pulmonary and Critical Care Medicine  12/28/23 14:03 EST         Electronically signed by Edvin Child MD at 12/28/23 1410       Edvin Coffey MD at 12/28/23 1202          Neurology Note    Patient:  Chano Rees    YOB: 1970    REFERRING PHYSICIAN:  Dr. Ziegler    CHIEF COMPLAINT:    Left-sided weakness and facial droop    HISTORY OF PRESENT ILLNESS:   The patient is stable, NIHSS 16 this am partly 22 incorrect coy verbal responses per RN, started on CIWA, no seizures reported.    Past Medical History:  Past Medical History:   Diagnosis Date    Hypertension        Past Surgical History:  Past Surgical History:   Procedure Laterality Date    ANKLE SURGERY Left        Social History:   Social History     Socioeconomic History    Marital status: Unknown   Tobacco Use    Smoking status: Former     Types: Cigarettes    Smokeless tobacco: Current     Types: Chew   Vaping Use    Vaping Use: Never used   Substance and Sexual Activity    Alcohol use: Not Currently    Drug use:  Not Currently    Sexual activity: Defer        Family History:   History reviewed. No pertinent family history.    Medications Prior to Admission:    Prior to Admission medications    Not on File       Allergies:  Shellfish-derived products      Review of system  Review of Systems   Unable to perform ROS: Mental status change       Vitals:    12/28/23 1000   BP: 106/57   Pulse: 105   Resp:    Temp:    SpO2: 95%       Physical exam  Physical Exam  Eyes:      Extraocular Movements: Extraocular movements intact.      Pupils: Pupils are equal, round, and reactive to light.   Cardiovascular:      Rate and Rhythm: Normal rate and regular rhythm.   Pulmonary:      Effort: Pulmonary effort is normal.   Neurological:      Mental Status: He is alert and oriented to person, place, and time.      Sensory: Sensation is intact.      Deep Tendon Reflexes: Babinski sign absent on the right side. Babinski sign present on the left side.      Comments: Speech dysarthric, left facial droop, VFF, moves right side well, left side some against gravity.           Lab Results   Component Value Date    WBC 11.19 (H) 12/27/2023    HGB 17.1 12/27/2023    HCT 50.0 12/27/2023    MCV 87.0 12/27/2023     12/27/2023     Lab Results   Component Value Date    GLUCOSE 165 (H) 12/28/2023    BUN 17 12/28/2023    CREATININE 1.44 (H) 12/28/2023    BCR 11.8 12/28/2023    CO2 17.0 (L) 12/28/2023    CALCIUM 9.7 12/28/2023    ALBUMIN 4.3 12/27/2023    AST 28 12/27/2023    ALT 41 12/27/2023   Urine Drug Screen - Urine, Clean Catch  Order: [id number]  Status: Final result       Visible to patient: No (not released)       Next appt: None    Specimen Information: Urine, Clean Catch   0 Result Notes      Component  Ref Range & Units 1 d ago   THC, Screen, Urine  Negative Negative   Phencyclidine (PCP), Urine  Negative Negative   Cocaine Screen, Urine  Negative Negative   Methamphetamine, Ur  Negative Negative   Opiate Screen  Negative Negative   Amphetamine  Screen, Urine  Negative Negative   Benzodiazepine Screen, Urine  Negative Negative   Tricyclic Antidepressants Screen  Negative Negative   Methadone Screen, Urine  Negative Negative   Barbiturates Screen, Urine  Negative Negative   Oxycodone Screen, Urine  Negative Negative   Buprenorphine, Screen, Urine  Negative Negative        Osmolality  275 - 295 mOsm/kg 312 High  307 High      Sodium  136 - 145 mmol/L 139     Radiological Studies:   Adult Transthoracic Echo Complete W/ Cont if Necessary Per Protocol (With Agitated Saline)    Result Date: 12/28/2023    Left ventricular systolic function is normal. Calculated left ventricular EF = 55.6%   Left ventricular diastolic function is consistent with (grade I) impaired relaxation.   Saline test results are negative.     CT Head Without Contrast    Result Date: 12/28/2023  CT HEAD WO CONTRAST Date of Exam: 12/28/2023 5:05 AM EST Indication: Stroke, follow up. Comparison: CT scan of the head dated December 27, 2023 at 2105 hours Technique: Axial CT images were obtained of the head without contrast administration.  Automated exposure control and iterative construction methods were used. Findings: There has been very slight increase in the size of the right thalamic hemorrhage which was 3 cm and is now 3.2 cm. There is new right posterior horn lateral ventricle intraventricular hemorrhage which is very mild measuring less than 3 mm in thickness and about 7 mm in transverse diameter layering in the posterior horn. There is mild right to left midline shift of about 2 mm. Contrast is seen in the middle cerebral arteries. There are no abnormal extra-axial fluid collections. There are no new hemorrhages.     Impression: Very slight increase in the size of the right thalamic hemorrhage 3.2 cm in diameter previously 3 cm with 2 mm of right to left midline shift and a trace amount of posterior horn right lateral ventricle intraventricular hemorrhage. Electronically Signed: Christoph  MD Dylan  12/28/2023 5:33 AM EST  Workstation ID: [id number]    XR Chest 1 View    Result Date: 12/27/2023  XR CHEST 1 VW Date of Exam: 12/27/2023 9:49 PM EST Indication: Acute Stroke Protocol (onset < 12 hrs) Comparison: None available. Findings: Enlarged cardiac silhouette. Ectatic thoracic aorta. No focal consolidation. No pleural effusion. No pneumothorax. No acute osseous abnormality.     Impression: No focal consolidation. Enlarged cardiac silhouette and ectatic thoracic aorta. Electronically Signed: Victor M Guevara MD  12/27/2023 10:17 PM EST  Workstation ID: [id number]    CT Angiogram Head w AI Analysis of LVO    Result Date: 12/27/2023  CT ANGIOGRAM HEAD W AI ANALYSIS OF LVO, CT ANGIOGRAM NECK Date of Exam: 12/27/2023 9:10 PM EST Indication: Neuro deficit, acute stroke suspected Neuro deficit, acute stroke suspected. Comparison: Same day noncontrast head CT. Technique: CTA of the head was performed after the uneventful intravenous administration of 75 mL Isovue-370. Reconstructed coronal and sagittal images were also obtained. In addition, a 3-D volume rendered image was created for interpretation. Automated  exposure control and iterative reconstruction methods were used. Findings: Vascular findings: Bilateral common carotid arteries are patent. Low-grade mixed atheromatous disease of the carotid bifurcations without significant stenosis by NASCET criteria. Bilateral cervical and intracranial portions of the internal carotid arteries are patent. Bilateral MCAs are patent. Bilateral ACAs are patent. Vertebral arteries are patent. Basilar artery is patent. Bilateral PCAs are patent. No evidence of dissection or aneurysm. Nonvascular findings: Visualized lung apices are grossly clear. Glandular tissue is symmetric and normal appearing. Aerodigestive structures are within normal limits. No acute or suspicious soft tissue or osseous lesion. Right thalamic hemorrhage as discussed on noncontrast head CT.      Impression: No evidence of large vessel occlusion, flow-limiting stenosis, dissection, or aneurysm. Electronically Signed: Victor M Guevara MD  12/27/2023 9:46 PM EST  Workstation ID: [id number]    CT Angiogram Neck    Result Date: 12/27/2023  CT ANGIOGRAM HEAD W AI ANALYSIS OF LVO, CT ANGIOGRAM NECK Date of Exam: 12/27/2023 9:10 PM EST Indication: Neuro deficit, acute stroke suspected Neuro deficit, acute stroke suspected. Comparison: Same day noncontrast head CT. Technique: CTA of the head was performed after the uneventful intravenous administration of 75 mL Isovue-370. Reconstructed coronal and sagittal images were also obtained. In addition, a 3-D volume rendered image was created for interpretation. Automated  exposure control and iterative reconstruction methods were used. Findings: Vascular findings: Bilateral common carotid arteries are patent. Low-grade mixed atheromatous disease of the carotid bifurcations without significant stenosis by NASCET criteria. Bilateral cervical and intracranial portions of the internal carotid arteries are patent. Bilateral MCAs are patent. Bilateral ACAs are patent. Vertebral arteries are patent. Basilar artery is patent. Bilateral PCAs are patent. No evidence of dissection or aneurysm. Nonvascular findings: Visualized lung apices are grossly clear. Glandular tissue is symmetric and normal appearing. Aerodigestive structures are within normal limits. No acute or suspicious soft tissue or osseous lesion. Right thalamic hemorrhage as discussed on noncontrast head CT.     Impression: No evidence of large vessel occlusion, flow-limiting stenosis, dissection, or aneurysm. Electronically Signed: Victor M Guevara MD  12/27/2023 9:46 PM EST  Workstation ID: [id number]    CT Head Without Contrast Stroke Protocol    Result Date: 12/27/2023  CT HEAD WO CONTRAST STROKE PROTOCOL Date of Exam: 12/27/2023 9:03 PM EST Indication: Neuro deficit, acute, stroke suspected Neuro deficit, acute stroke  suspected. Comparison: None available. Technique: Axial CT images were obtained of the head without contrast administration.  Reconstructed coronal images were also obtained. Automated exposure control and iterative construction methods were used. Scan Time: 2105 Results discussed with the stroke coordinator via telephone by Victor M Guevara MD at 2106. Findings: 3.0 x 3.0 x 2.3 cm parenchymal hemorrhage centered in the right thalamus. Mild surrounding edema with mild regional mass effect without overt midline. No evidence of intraventricular blood products. Lacunar infarct in the left basal ganglia. No extra-axial collection. Ventricular system is nondilated. Basal cisterns are patent. No acute or suspicious soft tissue or osseous lesion. Paranasal sinuses and mastoid air cells are well aerated.     Impression: Right thalamic hemorrhage measuring up to 3 cm. Electronically Signed: Victor M Guevara MD  12/27/2023 9:36 PM EST  Workstation ID: [id number]       During this visit the following were done:  Labs Reviewed [x]    Labs Ordered []    Radiology Reports Reviewed [x]    Radiology Ordered [x]    EKG, echo, and/or stress test reviewed [x]    EEG results reviewed  []    EEG reviewed and interpreted per myself   []    Discussed case with neurointerventionalist or neuroradiologist []    Referring Provider Records Reviewed []    ER Records Reviewed []    Hospital Records Reviewed []    History Obtained From Family []    Radiological images view and Interpreted per myself [x]    Case Discussed with referring provider []     Decision to obtain and request outside records  []        Assessment and Plan     Acute right BG ICH with mild mass effect 22 HTN, noncompliant with meds. F/U CTH stable. Question of alcohol abuse.   - ICU observation overnight.   - BP<140/90.   - Stop 3% NS.   - F/U CTH in am.   - CIWA.   - Thiamine.   - ST, PT, OT.              Electronically signed by Edvin Coffey MD on 12/28/2023 at 12:02  EST        Electronically signed by Edvin Coffey MD at 12/28/23 2203          Consult Notes (all)        Idris Hopkins PA-C at 12/28/23 1430          Consults    Referring Provider: Toney MARTINEZ    Patient Care Team:  Provider, No Known as PCP - General    Chief Complaint: Left-sided weakness    Subjective .     History of present illness:       Patient is a nice 53-year-old gentleman who was brought in secondary to left-sided weakness.  Patient is noncompliant and has history of high blood pressure.  Upon arrival to the hospital his blood pressure was 203/140 and was taken to the CT scanner for stroke protocol secondary to left-sided weakness and left-sided facial droop and slurred speech.    Patient was found to have a deep-seated right basal ganglia hemorrhage.  Secondary to this neurosurgery was asked to look at the scans.    Patient was taken to medically speaking about his blood pressure under control and was evaluated postop day #1.  Patient's extinction of his left upper extremity has improved at this point and he is able to follow some commands on his left side.  Patient still has a forced gaze toward the right.    CT of the head was reviewed and compared with 1 upon arrival and shows no real serious extension perhaps some mild expansion.     This is a deep-seated and nonsurgical hemorrhage      Review of Systems  Reviewed 14 points negative other than HPI  History  Past Medical History:   Diagnosis Date    Hypertension    ,   Past Surgical History:   Procedure Laterality Date    ANKLE SURGERY Left    , History reviewed. No pertinent family history.,   Social History     Socioeconomic History    Marital status: Unknown   Tobacco Use    Smoking status: Former     Types: Cigarettes    Smokeless tobacco: Current     Types: Chew   Vaping Use    Vaping Use: Never used   Substance and Sexual Activity    Alcohol use: Not Currently    Drug use: Not Currently    Sexual activity: Defer      E-cigarette/Vaping    E-cigarette/Vaping Use Never User     Passive Exposure No     Counseling Given No      E-cigarette/Vaping Substances     E-cigarette/Vaping Devices         ,   No medications prior to admission.   , Scheduled Meds:  folic acid 1 mg in sodium chloride 0.9 % 50 mL IVPB, 1 mg, Intravenous, Daily  lisinopril, 10 mg, Oral, Q24H  LORazepam, 2 mg, Oral, Q6H   Followed by  [START ON 12/29/2023] LORazepam, 1 mg, Oral, Q6H  metoprolol tartrate, 25 mg, Oral, Q12H  multivitamin pediatric chewable, 1 tablet, Oral, Daily  nicotine, 1 patch, Transdermal, Q24H  sodium chloride, 10 mL, Intravenous, Q12H  thiamine (B-1) IV, 100 mg, Intravenous, Daily    , Continuous Infusions:  niCARdipine, 5-15 mg/hr, Last Rate: 10 mg/hr (12/28/23 1334)  sodium chloride, 20 mL/hr, Last Rate: 20 mL/hr (12/28/23 0001)    , PRN Meds:    Calcium Replacement - Follow Nurse / BPA Driven Protocol    labetalol    LORazepam **OR** midazolam **OR** LORazepam    Magnesium Standard Dose Replacement - Follow Nurse / BPA Driven Protocol    Phosphorus Replacement - Follow Nurse / BPA Driven Protocol    Potassium Replacement - Follow Nurse / BPA Driven Protocol    sodium chloride    sodium chloride    sodium chloride, and Allergies:  Shellfish-derived products   SMOKING STATUS: Non-smoker  Objective     Vital Signs   Temp:  [97.9 °F (36.6 °C)-98.4 °F (36.9 °C)] 98 °F (36.7 °C)  Heart Rate:  [] 101  Resp:  [18-22] 18  BP: (106-226)/() 126/85  Body mass index is 38.83 kg/m².    Physical Exam:     Body mass index is 38.83 kg/m².    GENERAL:           The patient is in no acute distress, and is able to answer all questions appropriately.  But with slurred speech    Neck:          Supple without lymphadenopathy    Cardiovascular:       Peripheral pulses 2+ at dorsalis pedis and posterior tibialis    Lungs:         Breathing unlabored    Musculoskeletal:            strength is 5 out of 5 on the right 4 out of 5 on left        Shoulder abduction is 5 out of 5.  On right 3 out of 5 on left       Dorsiflexion is 5/5 on right 4-5 on left       Plantarflexion is 5/5 on right 4-5 on left       Hip Flexion 5/5 on right 3 out of 5 on left  Gait not tested    Neurologic:          The patient is alert and oriented           Pupils are equal and reactive to light.         Visual fields show neglect of the left side       Patient has facial droop and mild left pronator drift        sensation is disrupted on the left side    CRANIAL NERVES:         Cranial nerve II: Visual fields are full to confrontation.       Cranial nerves III, IV and VI: PERRLA DC.  Extraocular movements are intact.  Nystagmus is not present.       Cranial nerve V: Facial sensation is intact to light touch.       Cranial nerve VII: Facial droop noted on the left       Cranial nerve VIII: Hearing is intact to finger rub bilaterally.       Cranial nerve IX and X: Palate elevates symmetrically.        Cranial nerve XI: Shoulder shrug is intact.       Cranial nerve XII: Tongue is midline without evidence of Atrophy or fasciculation.      Results Review:   I reviewed the patient's new imaging results and agree with the interpretation.  Discussed with Dr. Murphy.    Patient has had 2 stable CT showing no real extension of the right thalamic hemorrhage    Assessment & Plan       Rt Thalamic ICH    Hypertension    Non-compliance    History of alcohol abuse    From a neurosurgical perspective there is no role for neurosurgical intervention.  We be happy to follow-up with him outpatient after he is medically cleared and gets out of rehab with a repeat CT to ensure that there is resolution of the clot    I discussed the patients findings and my recommendations with patient and family    Idris Hopkins PA-C  12/28/23  14:34 EST    Time:  60 minutes            Electronically signed by Idris Hopkins PA-C at 12/28/23 1434       Alex Rivers APRN at 12/27/23 6217        Consult Orders     1. Inpatient Neurology Consult Stroke [[id number]] ordered by Brice Wall MD at 23              Attestation signed by Edvin Coffey MD at 23 1201    I have reviewed this documentation and agree.                  Stroke Consult Note    Patient Name: Chano Rees   MRN: [id number]  Age: 53 y.o.  Sex: male  : 1970    Primary Care Physician: No primary care provider on file.  Referring Physician:  Dr. Wall    TIME STROKE TEAM CALLED:  EST     TIME PATIENT SEEN:  EST    Handedness: Right  Race: White     Chief Complaint/Reason for Consultation: Left facial droop, speech difficulty and left-sided weakness    HPI: Mr. Rees is a 53-year-old male with PMH of hypertension.  Patient states he has been noncompliant with his blood pressure medications for the past 6 months.  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  Mr. Rees presents with left facial droop, speech difficulty and left-sided weakness.  Flight crew reports patient was eating dinner with his girlfriend when he had acute onset of symptoms.    Initial NIH 16.  Blood pressure 203/139.  On exam patient is able answer questions appropriately and follow one-step commands.  Right gaze preference that does not cross midline.  Patient complains of headache behind right eye.  Peripheral vision loss to left eye.  Left facial droop with mild expressive aphasia and mild dysarthria noted during conversation.  Decrease sensitivity to left-sided body.  Left upper extremity strength 2/5.  Left lower extremity strength 3/5.  Neglect to left-side of body.    Last Known Normal Date/Time: 2023 at 1900 EST     Review of Systems   Constitutional:  Negative for fever.   HENT:  Negative for trouble swallowing and voice change.    Eyes:  Positive for visual disturbance.   Respiratory:  Negative for shortness of breath.    Cardiovascular:  Negative for chest  pain.   Gastrointestinal:  Negative for abdominal pain.   Neurological:  Positive for facial asymmetry, speech difficulty, weakness, numbness and headaches.   Psychiatric/Behavioral:  Negative for confusion.       No past medical history on file.  No past surgical history on file.  No family history on file.     Allergies   Allergen Reactions    Shellfish-Derived Products Hives     Prior to Admission medications    Not on File            Neurological Exam  Mental Status  Alert. Oriented to person, place and time. Oriented to person, place, and time. Mild dysarthria present. Expressive aphasia present.    Cranial Nerves  CN II: Vision test: Right gaze preference that does not cross midline.  Left eye peripheral vision loss.. Right visual acuity: Counts fingers. Right gaze preference that does not cross midline.  Left eye peripheral vision loss..  CN III, IV, VI: Pupils equal round and reactive to light bilaterally.  CN V:  Right: Facial sensation is normal.  Left: Diminished sensation of the entire left side of the face.  CN VII:  Left: There is central facial weakness.  CN VIII: Hearing intact.  CN XI:  Right: Sternocleidomastoid strength is normal. Trapezius strength is normal.  Left: Sternocleidomastoid strength is weak. Trapezius strength is weak.  CN XII: Tongue midline without atrophy or fasciculations.    Motor  Normal muscle bulk throughout. Normal muscle tone. Strength is 5/5 in all four extremities except as noted.  Strength left upper extremity 2/5.  Strength left lower extremity 3/5..    Sensory  Light touch abnormality: Sensation: Decrease sensitivity to left side of body.     Coordination    Unable to assess.    Gait    Unable to assess.      Physical Exam  Constitutional:       General: He is not in acute distress.     Appearance: He is ill-appearing.   HENT:      Head: Normocephalic and atraumatic.      Nose: Nose normal.      Mouth/Throat:      Mouth: Mucous membranes are dry.   Eyes:      Pupils:  Pupils are equal, round, and reactive to light.      Comments: Right gaze preference that does not cross midline.  Left eye peripheral vision loss.   Cardiovascular:      Pulses: Normal pulses.   Pulmonary:      Effort: Pulmonary effort is normal.   Abdominal:      Comments: Round, obese   Musculoskeletal:         General: Normal range of motion.      Cervical back: Normal range of motion.   Skin:     General: Skin is warm and dry.   Neurological:      Mental Status: He is alert and oriented to person, place, and time.      Cranial Nerves: Cranial nerve deficit and dysarthria present.      Sensory: Sensory deficit present.      Motor: Weakness present.   Psychiatric:         Mood and Affect: Mood normal.         Behavior: Behavior normal.         Acute Stroke Data    Thrombolytic Inclusion / Exclusion Criteria    Time: 21:12 EST  Person Administering Scale: AURELIANO Fairbanks    Inclusion Criteria  [x]   18 years of age or greater   [x]   Onset of symptoms < 4.5 hours before beginning treatment (stroke onset = time patient was last seen well or without symptoms).   []   Diagnosis of acute ischemic stroke causing measurable disabling deficit (Complete Hemianopia, Any Aphasia, Visual or Sensory Extinction, Any weakness limiting sustained effort against gravity)   []   Any remaining deficit considered potentially disabling in view of patient and practitioner   Exclusion criteria (Do not proceed with Alteplase if any are checked under exclusion criteria)  []   Onset unknown or GREATER than 4.5 hours   [x]   ICH on CT/MRI   []   CT demonstrates hypodensity representing acute or subacute infarct   []   Significant head trauma or prior stroke in the previous 3 months   []   Symptoms suggestive of subarachnoid hemorrhage   []   History of un-ruptured intracranial aneurysm GREATER than 10 mm   []   Recent intracranial or intraspinal surgery within the last 3 months   []   Arterial puncture at a non-compressible site in the  previous 7 days   []   Active internal bleeding   []   Acute bleeding tendency   []   Platelet count LESS than 100,000 for known hematological diseases such as leukemia, thrombocytopenia or chronic cirrhosis   []   Current use of anticoagulant with INR GREATER than 1.7 or PT GREATER than 15 seconds, aPTT GREATER than 40 seconds   []   Heparin received within 48 hours, resulting in abnormally elevated aPTT GREATER than upper limit of normal   []   Current use of direct thrombin inhibitors or direct factor Xa inhibitors in the past 48 hours   []   Elevated blood pressure refractory to treatment (systolic GREATER than 185 mm/Hg or diastolic  GREATER than 110 mm/Hg   []   Suspected infective endocarditis and aortic arch dissection   []   Current use of therapeutic treatment dose of low-molecular-weight heparin (LMWH) within the previous 24 hours   []   Structural GI malignancy or bleed   Relative exclusion for all patients  []   Only minor non-disabling symptoms   []   Pregnancy   []   Seizure at onset with postictal residual neurological impairments   []   Major surgery or previous trauma within past 14 days   []   History of previous spontaneous ICH, intracranial neoplasm, or AV malformation   []   Postpartum (within previous 14 days)   []   Recent GI or urinary tract hemorrhage (within previous 21 days)   []   Recent acute MI (within previous 3 months)   []   History of un-ruptured intracranial aneurysm LESS than 10 mm   []   History of ruptured intracranial aneurysm   []   Blood glucose LESS than 50 mg/dL (2.7 mmol/L)   []   Dural puncture within the last 7 days   []   Known GREATER than 10 cerebral microbleeds   Additional exclusions for patients with symptoms onset between 3 and 4.5 hours.  []   Age > 80.   []   On any anticoagulants regardless of INR  >>> Warfarin (Coumadin), Heparin, Enoxaparin (Lovenox), fondaparinux (Arixtra), bivalirudin (Angiomax), Argatroban, dabigatran (Pradaxa), rivaroxaban (Xarelto), or  apixaban (Eliquis)   []   Severe stroke (NIHSS > 25).   []   History of BOTH diabetes and previous ischemic stroke.   []   The risks and benefits have been discussed with the patient or family related to the administration of IV thrombolytic therapy for stroke symptoms.   []   I have discussed and reviewed the patient's case and imaging with the attending prior to IV thrombolytic therapy.   NA Time IV thrombolytic administered       Hospital Meds:  Scheduled- labetalol, 20 mg, Intravenous, Once      Infusions- niCARdipine, 5-15 mg/hr       PRNs-   sodium chloride    Functional Status Prior to Current Stroke/[place] Score:   MODIFIED VAHID SCALE (to be assessed for each patient having history of stroke) []Stroke history but not assessed  [x]0: No symptoms at all  []1: No significant disability despite symptoms  []2: Slight disability  []3: Moderate disability  []4: Moderately severe disability  []5: Severe disability  []6: Death        NIH Stroke Scale  Time: 21:12 EST  Person Administering Scale: AURELIANO [place]  Interval: baseline  1a. Level of Consciousness: 0-->Alert, keenly responsive  1b. LOC Questions: 0-->Answers both questions correctly  1c. LOC Commands: 0-->Performs both tasks correctly  2. Best Gaze: 2-->Forced deviation, or total gaze paresis not overcome by the oculocephalic maneuver  3. Visual: 1-->Partial hemianopia  4. Facial Palsy: 2-->Partial paralysis (total or near-total paralysis of lower face)  5a. Motor Arm, Left: 3-->No effort against gravity, limb falls  5b. Motor Arm, Right: 0-->No drift, limb holds 90 (or 45) degrees for full 10 secs  6a. Motor Leg, Left: 2-->Some effort against gravity, leg falls to bed by 5 secs, but has some effort against gravity  6b. Motor Leg, Right: 0-->No drift, leg holds 30 degree position for full 5 secs  7. Limb Ataxia: 0-->Absent  8. Sensory: 2-->Severe to total sensory loss, patient is not aware of being touched in the face, arm, and leg  9. Best Language:  1-->Mild-to-moderate aphasia, some obvious loss of fluency or facility of comprehension, without significant limitation on ideas expressed or form of expression. Reduction of speech and/or comprehension, however, makes conversation. . . (see row details)  10. Dysarthria: 1-->Mild-to-moderate dysarthria, patient slurs at least some words and, at worst, can be understood with some difficulty  11. Extinction and Inattention (formerly Neglect): 2-->Profound lamberto-inattention/extinction more than 1 modality    Total (NIH Stroke Scale): 16       ICH Score:  0 Points (GCS 13 to 15)  0 Points (ICH volume < 30 cm3)  0 Points (Intraventricular Extension Absent)   0 Points (Infratentorial Origin - No )  0 Points (Age < 80 years)  The total ICS Score for this patient is 0 at 21:25 EST on 12/27/23     Results Reviewed:  I have personally reviewed current lab, radiology, and data and agree with results.    WBC   Date Value Ref Range Status   12/27/2023 9.71 3.40 - 10.80 10*3/mm3 Final     RBC   Date Value Ref Range Status   12/27/2023 5.53 4.14 - 5.80 10*6/mm3 Final     Hemoglobin   Date Value Ref Range Status   12/27/2023 16.3 13.0 - 17.7 g/dL Final   12/27/2023 17.0 12.0 - 17.0 g/dL Final     Comment:     Serial Number: [id number]:  [id number]     Hematocrit   Date Value Ref Range Status   12/27/2023 49.2 37.5 - 51.0 % Final   12/27/2023 50 38 - 51 % Final     MCV   Date Value Ref Range Status   12/27/2023 89.0 79.0 - 97.0 fL Final     MCH   Date Value Ref Range Status   12/27/2023 29.5 26.6 - 33.0 pg Final     MCHC   Date Value Ref Range Status   12/27/2023 33.1 31.5 - 35.7 g/dL Final     RDW   Date Value Ref Range Status   12/27/2023 13.6 12.3 - 15.4 % Final     RDW-SD   Date Value Ref Range Status   12/27/2023 44.4 37.0 - 54.0 fl Final     MPV   Date Value Ref Range Status   12/27/2023 10.4 6.0 - 12.0 fL Final     Platelets   Date Value Ref Range Status   12/27/2023 249 140 - 450 10*3/mm3 Final     Neutrophil %   Date Value  Ref Range Status   12/27/2023 67.9 42.7 - 76.0 % Final     Lymphocyte %   Date Value Ref Range Status   12/27/2023 15.4 (L) 19.6 - 45.3 % Final     Monocyte %   Date Value Ref Range Status   12/27/2023 12.9 (H) 5.0 - 12.0 % Final     Eosinophil %   Date Value Ref Range Status   12/27/2023 2.6 0.3 - 6.2 % Final     Basophil %   Date Value Ref Range Status   12/27/2023 0.6 0.0 - 1.5 % Final     Immature Grans %   Date Value Ref Range Status   12/27/2023 0.6 (H) 0.0 - 0.5 % Final     Neutrophils, Absolute   Date Value Ref Range Status   12/27/2023 6.59 1.70 - 7.00 10*3/mm3 Final     Lymphocytes, Absolute   Date Value Ref Range Status   12/27/2023 1.50 0.70 - 3.10 10*3/mm3 Final     Monocytes, Absolute   Date Value Ref Range Status   12/27/2023 1.25 (H) 0.10 - 0.90 10*3/mm3 Final     Eosinophils, Absolute   Date Value Ref Range Status   12/27/2023 0.25 0.00 - 0.40 10*3/mm3 Final     Basophils, Absolute   Date Value Ref Range Status   12/27/2023 0.06 0.00 - 0.20 10*3/mm3 Final     Immature Grans, Absolute   Date Value Ref Range Status   12/27/2023 0.06 (H) 0.00 - 0.05 10*3/mm3 Final     nRBC   Date Value Ref Range Status   12/27/2023 0.0 0.0 - 0.2 /100 WBC Final      Lab Results   Component Value Date    CREATININE 1.20 12/27/2023    EGFR 72.3 12/27/2023    AST 25 12/27/2023    ALT 40 12/27/2023    CT Angiogram Head w AI Analysis of LVO    Result Date: 12/27/2023  Impression: No evidence of large vessel occlusion, flow-limiting stenosis, dissection, or aneurysm. Electronically Signed: Victor M Guevara MD  12/27/2023 9:46 PM EST  Workstation ID: [id number]    CT Angiogram Neck    Result Date: 12/27/2023  Impression: No evidence of large vessel occlusion, flow-limiting stenosis, dissection, or aneurysm. Electronically Signed: Victor M Guevara MD  12/27/2023 9:46 PM EST  Workstation ID: [id number]    CT Head Without Contrast Stroke Protocol    Result Date: 12/27/2023  Impression: Right thalamic hemorrhage measuring up to 3 cm.  Electronically Signed: Victor M Guevara MD  12/27/2023 9:36 PM EST  Workstation ID: [id number]           Assessment/Plan:  Mr. Rees is a 53-year-old male with PMH of hypertension.  Patient states he has been noncompliant with his blood pressure medications for the past 6 months.  He is a scene flight from [facility] by air methods for stroke workup and higher level of care.  Mr. Rees presents with left facial droop, speech difficulty and left-sided weakness.  Flight crew reports patient was eating dinner with his girlfriend when he had acute onset of symptoms. Patient is not a candidate for IV thrombolytic therapy due to ICH.  Patient is not a candidate for endovascular therapy due to CT scan results.    Antiplatelet PTA: None  Anticoagulant PTA: None        Right  thalamic hemorrhage  Initiate hemorrhagic order set  N.p.o. until bedside dysphagia screening complete by RN  Strict bedrest  Blood pressure goals, SBP less than 140  Cardene drip as needed for BP management  Neurosurgical consult in a.m.  Repeat CTh in a.m.  Hypertonic 3% saline protocol start at 20 mL/hour  Vitamin K 10 mg IV x 1 now  MRI brain without contrast routine  TTE routine  A1c, lipid panel routine  Diabetes educator to see if appropriate  PT/OT/SLP eval and treat  Case management to follow      Plan of care discussed with Dr. Wall, Dr. Norton, Idris Hopkins PA-C, patient and primary RN.  Stroke neurology will continue to follow.  Thank you for the consult.  Call with any questions or concerns.    Alex Rivers, AURELIANO  December 27, 2023  21:12 EST     Electronically signed by Edvin Coffey MD at 12/28/23 1201

## 2024-01-02 NOTE — PROGRESS NOTES
Intensive Care Follow-up     Hospital:  LOS: 6 days   Mr. Chano Rees, 53 y.o. male is followed for:   ICH (intracerebral hemorrhage)            History of present illness:   53-year-old male with past medical history significant for hypertension and poor medical compliance.  Patient presented to emergency room on 12/27 with sudden onset of left-sided weakness and headache.  Patient had significantly elevated blood pressure readings in the emergency room.  CT head revealed 3 cm right thalamic ICH.  CTA was unremarkable and it was felt that patient with hypertensive bleed.  Patient was admitted to ICU for blood pressure control.  Patient apparently has history of alcoholism but was not drinking alcohol lately.  Patient had waxing and waning mental status.  Required respiratory assistance with BiPAP support.  He was having trouble with secretions and low-grade fever so empirically started on Zosyn therapy as well.      Subjective   Interval History:  Today morning patient was on Precedex drip.  He is somewhat sedated.  Not able to follow commands.  He is on 40% BiPAP and tolerating well.                 The patient's past medical, surgical and social history were reviewed and updated in Epic as appropriate.       Objective     Infusions:  dexmedetomidine, 0.2-1.5 mcg/kg/hr, Last Rate: 0.3 mcg/kg/hr (01/02/24 1122)  niCARdipine, 5-15 mg/hr, Last Rate: Stopped (01/02/24 1100)      Medications:  lisinopril, 20 mg, Nasogastric, Q24H  metoprolol tartrate, 50 mg, Nasogastric, Q12H  multivitamin pediatric chewable, 1 tablet, Nasogastric, Daily  nicotine, 1 patch, Transdermal, Q24H  piperacillin-tazobactam, 4.5 g, Intravenous, Q8H  ProSource No Carb, 30 mL, Nasogastric, BID  sodium chloride, 10 mL, Intravenous, Q12H        Vital Sign Min/Max for last 24 hours  Temp  Min: 97.7 °F (36.5 °C)  Max: 100.2 °F (37.9 °C)   BP  Min: 100/68  Max: 161/100   Pulse  Min: 65  Max: 105   Resp  Min: 26  Max: 28   SpO2  Min: 90 %  Max:  "99 %   Flow (L/min)  Min: 6  Max: 6       Input/Output for last 24 hour shift  01/01 0701 - 01/02 0700  In: 3750.9 [I.V.:1561.6]  Out: 5020 [Urine:5020]      Objective:  Vital signs: (most recent): Blood pressure 113/88, pulse 71, temperature 97.7 °F (36.5 °C), temperature source Axillary, resp. rate 28, height 180.3 cm (71\"), weight 123 kg (270 lb 1 oz), SpO2 98%.            General Appearance: Lethargic on BiPAP   head:    Atraumatic, Normocephalic, without obvious abnormality, Pupils reactive & symmetrical B/L.   Lungs:   B/L Breath sounds present with decreased breath sounds on bases, no wheezing heard, no crackles.   Heart: S1 and S2 present, no murmur  Abdomen: Soft, nontender, no guarding or rigidity, bowel sounds positive.  Extremities: + edema, warm to touch.  Pulses: Positive and symmetric.  Neurologic: Not able to participate in full neurological exam      Results from last 7 days   Lab Units 01/02/24  0530 01/01/24  0459 12/31/23  0405   WBC 10*3/mm3 11.33* 12.08* 17.10*   HEMOGLOBIN g/dL 14.7 14.5 14.7   PLATELETS 10*3/mm3 201 232 225     Results from last 7 days   Lab Units 01/02/24  0530 01/01/24  1756 01/01/24  0459 12/31/23  0405 12/30/23  0457 12/28/23  1804 12/28/23  1322 12/28/23  0612 12/27/23  2358   SODIUM mmol/L 141  --  139 138 139   < >  --    < > 139  139   POTASSIUM mmol/L 4.6 4.1 3.4* 4.2 4.2   < >  --    < > 4.0  4.0   CO2 mmol/L 19.0*  --  20.0* 19.0* 21.0*   < >  --    < > 22.0  22.0   BUN mg/dL 30*  --  28* 27* 25*   < >  --    < > 12  12   CREATININE mg/dL 1.15  --  1.18 1.15 1.40*   < >  --    < > 1.28*  1.28*   MAGNESIUM mg/dL  --   --  2.6  --  1.9  --   --   --  2.3   PHOSPHORUS mg/dL  --   --  2.7  --  4.7*  --  3.6  --  1.5*   GLUCOSE mg/dL 103*  --  97 98 109*   < >  --    < > 133*  133*    < > = values in this interval not displayed.     Estimated Creatinine Clearance: 99.2 mL/min (by C-G formula based on SCr of 1.15 mg/dL).    Results from last 7 days   Lab Units " 01/01/24  0530   PH, ARTERIAL pH units 7.425   PCO2, ARTERIAL mm Hg 32.7*   PO2 ART mm Hg 80.0*       Images:   Chest x-ray reviewed and showed feeding tube terminating below the diaphragm and possibly postpyloric.  Left basilar atelectasis.  No pleural effusion.  No definite consolidation.    I reviewed the patient's results and images.     Echocardiogram reviewed  Interpretation Summary         Left ventricular systolic function is normal. Calculated left ventricular EF = 55.6%    Left ventricular diastolic function is consistent with (grade I) impaired relaxation.    Saline test results are negative.    Assessment & Plan   Impression        Rt Thalamic ICH    Hypertension    Non-compliance    History of alcohol abuse       Plan        1.  Patient presented here with ICH.  Continue to keep systolic blood pressure less than 140.  Neurology team is signed off.  2.  Ongoing encephalopathy, etiology unclear.  On CIWA protocol.  On Precedex infusion as well.  Unclear if he received thiamine supplementation.  Will go ahead and add that.  Will monitor for now and may need to repeat CT scan if not improving neurologically.  No other evidence of metabolic reasons or sepsis picture currently though remains on Zosyn for possible aspiration.  Will check procalcitonin level in the morning.  Urinalysis was negative for any acute UTI.  Toxicology screen showed benzodiazepines and no other controlled substances but patient was in the hospital for 2 days and may have received benzodiazepine prior to that.  3.  Patient is volume overloaded.  May be playing a role in his respiratory failure.  Echocardiogram showed diastolic dysfunction.  Will go ahead and diurese him further and try to keep him in a negative fluid balance.  4.  Stop Accu-Cheks as patient is not needing any insulin coverage.  5.  Continue enteral nutrition through [place].  Tolerating well.  Monitor bowel function.  6.  PT/OT.  7.  BiPAP at nighttime and during  daytime as needed.    Continue ICU monitoring and remains at risk of decline.    Plan of care and goals reviewed with multidisciplinary/antibiotic stewardship team during rounds.   I discussed the patient's findings and my recommendations with nursing staff       Time spent  38 min  (exclusive of procedure time)  including high complexity decision making to assess, manipulate, and support vital organ system failure in this individual who has impairment of one or more vital organ systems such that there is a high probability of imminent or life threatening deterioration in the patient’s condition.      Manuel Boone MD, [facility]  Pulmonary, Critical care and Sleep Medicine

## 2024-01-02 NOTE — SIGNIFICANT NOTE
01/02/24 1540   SLP Deferred Reason   SLP Deferred Reason Unable to treat, medical status change  (D/w RN, pt not appropriate this date, will defer and f/u as pt is able to participate.)

## 2024-01-02 NOTE — PROGRESS NOTES
Clinical Nutrition     Nutrition Support Assessment  Reason for Visit: Physician consult, EN      Patient Name: Chano Rees  YOB: 1970  MRN: [id number]  Date of Encounter: 01/02/24 11:07 EST  Admission date: 12/27/2023    Comments:  - To better meet needs will adjust EN to the following: Peptamen AF @ goal rate 85ml/hr + 2 prosource. Water flushes 10ml Qhrs to provide: 1700ml, 2160kcals (98% est needs), 159g PRO (100% est needs), 10.2g fiber, 1379ml water from EN + 200ml water flushes.      Nutrition Assessment   Admission Diagnosis:  ICH (intracerebral hemorrhage) [I61.9]    Problem List:    Rt Thalamic ICH    Hypertension    Non-compliance    History of alcohol abuse    PMH:   He  has a past medical history of Hypertension.    PSH:  He  has a past surgical history that includes Ankle surgery (Left).    Applicable Nutrition Concerns:   Skin:  Oral:  GI:    Applicable Interval History:   12/28) SLP eval (bedside eval) Regular diet with thin liquids.   12/29) Patient made NPO due to confusion.   12/30) EN started - VHP @ 80ml/hr.   1/2) EN changed to AF to better meet needs.     *On Bipap; limited Neuro status*      Reported/Observed/Food/Nutrition Related History:   1/2  On Bipap, tolerating EN @ goal rate.  EN via small bowel feeding.  Follows commands occasionally and opens eyes.  Requiring precedex for agitation.  No family at bedside at time of visit.     12/30  Order for EN 2/2 confusion. Not taking diet.     Labs    Labs Reviewed: Yes     Results from last 7 days   Lab Units 01/02/24  0530 01/01/24  1756 01/01/24  0459 12/31/23  0405 12/30/23  0457 12/30/23  0354 12/30/23  0108 12/29/23  2200 12/28/23  1804 12/28/23  1322 12/28/23  0612 12/27/23  2358 12/27/23  2117   0000   GLUCOSE mg/dL 103*  --  97 98 109*  --   --   --    < >  --    < > 133*  133*  --   --    BUN mg/dL 30*  --  28* 27* 25*  --   --   --    < >  --    < > 12  12  --   --    CREATININE mg/dL 1.15  --   "1.18 1.15 1.40*  --   --   --    < >  --    < > 1.28*  1.28*  --   --    SODIUM mmol/L 141  --  139 138 139  --   --   --    < >  --    < > 139  139  --   --    CHLORIDE mmol/L 107  --  107 106 106  --   --   --    < >  --    < > 104  104  --   --    POTASSIUM mmol/L 4.6 4.1 3.4* 4.2 4.2  --   --   --    < >  --    < > 4.0  4.0  --   --    PHOSPHORUS mg/dL  --   --  2.7  --  4.7*  --   --   --   --  3.6  --  1.5*  --    < >   MAGNESIUM mg/dL  --   --  2.6  --  1.9  --   --   --   --   --   --  2.3  --   --    ALT (SGPT) U/L  --   --  23  --   --   --   --   --   --   --   --  41 40  --    LACTATE mmol/L  --   --   --   --   --  1.6 2.2* 3.9*  --   --   --   --   --   --     < > = values in this interval not displayed.       Results from last 7 days   Lab Units 01/01/24  0459 12/30/23  0457 12/27/23  2358   ALBUMIN g/dL 3.2* 3.6 4.3   PREALBUMIN mg/dL 15.8*  --   --    CRP mg/dL 8.97*  --   --    IONIZED CALCIUM mmol/L  --   --  1.33*   CHOLESTEROL mg/dL 156  --  203*   TRIGLYCERIDES mg/dL 110  --  121       Results from last 7 days   Lab Units 01/02/24  0607 01/01/24  2320 01/01/24  1757 01/01/24  1246 01/01/24  0545 12/31/23  2312   GLUCOSE mg/dL 93 88 89 108 112 95     Lab Results   Lab Value Date/Time    HGBA1C 5.30 12/27/2023 2358         Results from last 7 days   Lab Units 12/27/23  2117   PROBNP pg/mL 322.9     Medications    Medications Reviewed: Yes  Pertinent: Lasix, MVI,   Infusion:  Precdex, cardene   PRN:     Intake/Ouptut 24 hrs (0701 - 0700)   I&O's Reviewed: Yes   + 3BM yesterday     Anthropometrics     Flowsheet Rows      Flowsheet Row First Filed Value   Admission Height 177.8 cm (70\") Documented at 12/27/2023 2152   Admission Weight 125 kg (275 lb) Documented at 12/27/2023 2152     Height: Height: 180.3 cm (71\")  Last Filed Weight: Weight: 123 kg (270 lb 1 oz) (01/02/24 0400)  Method: Weight Method: Bed scale  BMI: BMI (Calculated): 37.7  BMI classification: Obese Class II: " "35-39.9kg/m2    UBW: unk at this time wt of 278 on , wt of 275 lbs unk method   Weight change:      (1/2) bed weight 122.9kg (270lb)     Nutrition Focused Physical Exam     Date:     Unable to perform exam due to: Defer pending indication    Needs Assessment   Date:    Height used:Height: 180.3 cm (71\")  Weights used: 1270lb/ 123kg: IBW: 172lb/ 78kg      Estimated Calorie needs: ~2200  Kcal/day  Method:  Kcals/KG  14- 16= 1722-   Method: HBE: 2304kcals     Estimated Protein needs: ~ 159 g PRO/day  Method: g/Kg 1.25= 159; 2.0g/kg IBW = 156    Estimated Fluid needs: ~ ml/day   Per clinical status    Current Nutrition Prescription     PO: NPO Diet NPO Type: Strict NPO  Oral Nutrition Supplement:   Intake: N/A    EN: Peptamen Intense VHP  Goal Rate:80 ml/hr  Water Flushes: 30 ml ev 2 hr  Modular: Prosource -no carb 1/day  Route: ND per KU   Tube: Small bore     At goal over: 20Hrs/day    Rx will supply:   Goal Volume 1600  mL/day     Flush Volume 300 mL/day     Energy 1660 Kcal/day 101 % Est Need   Protein 162 g/day 102 % Est Need   Fiber 6.4 g/day     Water in  EN 1344 mL     Total Water 1644 mL     Meet DRI Yes        --------------------------------------------------------------------------  Product/Rate verified at bedside: Yes  Infusing Rate at time of visit: @ goal rate at time of visit.     Average Delivery from [facility] Day:  Volume 1837 mL/day 115  % Goal Vol.   Flush Volume 270 mL/day     Energy 1897 Kcal/day 86 % Est Need   Protein 186 g/day 117 % Est Need   Fiber 8.1 g/day     Water in  EN 1543 mL     Total Water 1813 mL     Meet DRI Yes        Nutrition Diagnosis   Date:  Updated:   Problem Inadequate oral intake   Etiology confusion   Signs/Symptoms Rpt pt not able to take po at this time   Status: EN meeting ~ 80% est needs     Goal:   General: Nutrition support treatment  PO: Advace diet as medically feasible/appropriate  EN/PN: Initiate EN, Tolerate EN at " goal    Nutrition Intervention      Follow treatment progress, Care plan reviewed, Nutrition support order placed    - To better meet needs will adjust EN to the following: Peptamen AF @ goal rate 85ml/hr + 2 prosource. Water flushes 10ml Qhrs to provide: 1700ml, 2160kcals (98% est needs), 159g PRO (100% est needs), 10.2g fiber, 1379ml water from EN + 200ml water flushes.     Monitoring/Evaluation:   Per protocol, I&O, Pertinent labs, EN delivery/tolerance, Weight, Symptoms, Swallow function      Cherelle Mejía, HAZEL  Time Spent: 45 min

## 2024-01-03 ENCOUNTER — ANCILLARY PROCEDURE (OUTPATIENT)
Dept: SPEECH THERAPY | Facility: HOSPITAL | Age: 54
End: 2024-01-03
Payer: COMMERCIAL

## 2024-01-03 LAB
ALBUMIN SERPL-MCNC: 3.3 G/DL (ref 3.5–5.2)
ALBUMIN/GLOB SERPL: 1 G/DL
ALP SERPL-CCNC: 61 U/L (ref 39–117)
ALT SERPL W P-5'-P-CCNC: 32 U/L (ref 1–41)
AMMONIA BLD-SCNC: 58 UMOL/L (ref 16–60)
ANION GAP SERPL CALCULATED.3IONS-SCNC: 11 MMOL/L (ref 5–15)
AST SERPL-CCNC: 24 U/L (ref 1–40)
BACTERIA SPEC AEROBE CULT: NORMAL
BACTERIA SPEC AEROBE CULT: NORMAL
BASOPHILS # BLD AUTO: 0.09 10*3/MM3 (ref 0–0.2)
BASOPHILS NFR BLD AUTO: 0.8 % (ref 0–1.5)
BILIRUB SERPL-MCNC: 0.4 MG/DL (ref 0–1.2)
BUN SERPL-MCNC: 30 MG/DL (ref 6–20)
BUN/CREAT SERPL: 30.9 (ref 7–25)
CALCIUM SPEC-SCNC: 9 MG/DL (ref 8.6–10.5)
CHLORIDE SERPL-SCNC: 107 MMOL/L (ref 98–107)
CO2 SERPL-SCNC: 22 MMOL/L (ref 22–29)
CREAT SERPL-MCNC: 0.97 MG/DL (ref 0.76–1.27)
DEPRECATED RDW RBC AUTO: 49.5 FL (ref 37–54)
EGFRCR SERPLBLD CKD-EPI 2021: 93.3 ML/MIN/1.73
EOSINOPHIL # BLD AUTO: 0.48 10*3/MM3 (ref 0–0.4)
EOSINOPHIL NFR BLD AUTO: 4.3 % (ref 0.3–6.2)
ERYTHROCYTE [DISTWIDTH] IN BLOOD BY AUTOMATED COUNT: 14.6 % (ref 12.3–15.4)
GLOBULIN UR ELPH-MCNC: 3.4 GM/DL
GLUCOSE SERPL-MCNC: 108 MG/DL (ref 65–99)
HCT VFR BLD AUTO: 45 % (ref 37.5–51)
HGB BLD-MCNC: 14.4 G/DL (ref 13–17.7)
IMM GRANULOCYTES # BLD AUTO: 0.2 10*3/MM3 (ref 0–0.05)
IMM GRANULOCYTES NFR BLD AUTO: 1.8 % (ref 0–0.5)
LYMPHOCYTES # BLD AUTO: 1.34 10*3/MM3 (ref 0.7–3.1)
LYMPHOCYTES NFR BLD AUTO: 12.1 % (ref 19.6–45.3)
MAGNESIUM SERPL-MCNC: 2.3 MG/DL (ref 1.6–2.6)
MCH RBC QN AUTO: 29.6 PG (ref 26.6–33)
MCHC RBC AUTO-ENTMCNC: 32 G/DL (ref 31.5–35.7)
MCV RBC AUTO: 92.6 FL (ref 79–97)
MONOCYTES # BLD AUTO: 1.41 10*3/MM3 (ref 0.1–0.9)
MONOCYTES NFR BLD AUTO: 12.7 % (ref 5–12)
NEUTROPHILS NFR BLD AUTO: 68.3 % (ref 42.7–76)
NEUTROPHILS NFR BLD AUTO: 7.55 10*3/MM3 (ref 1.7–7)
NRBC BLD AUTO-RTO: 0 /100 WBC (ref 0–0.2)
PHOSPHATE SERPL-MCNC: 3.4 MG/DL (ref 2.5–4.5)
PLATELET # BLD AUTO: 227 10*3/MM3 (ref 140–450)
PMV BLD AUTO: 10.7 FL (ref 6–12)
POTASSIUM SERPL-SCNC: 4.5 MMOL/L (ref 3.5–5.2)
PROCALCITONIN SERPL-MCNC: 0.21 NG/ML (ref 0–0.25)
PROT SERPL-MCNC: 6.7 G/DL (ref 6–8.5)
RBC # BLD AUTO: 4.86 10*6/MM3 (ref 4.14–5.8)
SODIUM SERPL-SCNC: 140 MMOL/L (ref 136–145)
WBC NRBC COR # BLD AUTO: 11.07 10*3/MM3 (ref 3.4–10.8)

## 2024-01-03 PROCEDURE — 84145 PROCALCITONIN (PCT): CPT | Performed by: INTERNAL MEDICINE

## 2024-01-03 PROCEDURE — 97530 THERAPEUTIC ACTIVITIES: CPT

## 2024-01-03 PROCEDURE — 80053 COMPREHEN METABOLIC PANEL: CPT | Performed by: INTERNAL MEDICINE

## 2024-01-03 PROCEDURE — 25810000003 SODIUM CHLORIDE 0.9 % SOLUTION 250 ML FLEX CONT: Performed by: INTERNAL MEDICINE

## 2024-01-03 PROCEDURE — 84100 ASSAY OF PHOSPHORUS: CPT | Performed by: INTERNAL MEDICINE

## 2024-01-03 PROCEDURE — 94799 UNLISTED PULMONARY SVC/PX: CPT

## 2024-01-03 PROCEDURE — 82140 ASSAY OF AMMONIA: CPT | Performed by: INTERNAL MEDICINE

## 2024-01-03 PROCEDURE — 97110 THERAPEUTIC EXERCISES: CPT

## 2024-01-03 PROCEDURE — 25010000002 NICARDIPINE 2.5 MG/ML SOLUTION 10 ML VIAL: Performed by: INTERNAL MEDICINE

## 2024-01-03 PROCEDURE — 99233 SBSQ HOSP IP/OBS HIGH 50: CPT | Performed by: INTERNAL MEDICINE

## 2024-01-03 PROCEDURE — 92610 EVALUATE SWALLOWING FUNCTION: CPT

## 2024-01-03 PROCEDURE — 83735 ASSAY OF MAGNESIUM: CPT | Performed by: INTERNAL MEDICINE

## 2024-01-03 PROCEDURE — 92612 ENDOSCOPY SWALLOW (FEES) VID: CPT

## 2024-01-03 PROCEDURE — 92507 TX SP LANG VOICE COMM INDIV: CPT

## 2024-01-03 PROCEDURE — 25010000002 FUROSEMIDE PER 20 MG: Performed by: INTERNAL MEDICINE

## 2024-01-03 PROCEDURE — 85025 COMPLETE CBC W/AUTO DIFF WBC: CPT | Performed by: INTERNAL MEDICINE

## 2024-01-03 PROCEDURE — 25010000002 PIPERACILLIN SOD-TAZOBACTAM PER 1 G: Performed by: INTERNAL MEDICINE

## 2024-01-03 RX ORDER — ACETAMINOPHEN 160 MG/5ML
650 SOLUTION ORAL EVERY 4 HOURS PRN
Status: DISCONTINUED | OUTPATIENT
Start: 2024-01-03 | End: 2024-01-07

## 2024-01-03 RX ORDER — METOPROLOL TARTRATE 50 MG/1
50 TABLET, FILM COATED ORAL EVERY 12 HOURS SCHEDULED
Status: DISCONTINUED | OUTPATIENT
Start: 2024-01-03 | End: 2024-01-08 | Stop reason: HOSPADM

## 2024-01-03 RX ORDER — FUROSEMIDE 10 MG/ML
40 INJECTION INTRAMUSCULAR; INTRAVENOUS ONCE
Status: COMPLETED | OUTPATIENT
Start: 2024-01-03 | End: 2024-01-03

## 2024-01-03 RX ORDER — LISINOPRIL 20 MG/1
20 TABLET ORAL
Status: DISCONTINUED | OUTPATIENT
Start: 2024-01-04 | End: 2024-01-05

## 2024-01-03 RX ORDER — AMLODIPINE BESYLATE 5 MG/1
5 TABLET ORAL
Status: DISCONTINUED | OUTPATIENT
Start: 2024-01-03 | End: 2024-01-04

## 2024-01-03 RX ORDER — ACETAMINOPHEN 650 MG/1
650 SUPPOSITORY RECTAL EVERY 4 HOURS PRN
Status: DISCONTINUED | OUTPATIENT
Start: 2024-01-03 | End: 2024-01-07

## 2024-01-03 RX ORDER — AMINO ACIDS/PROTEIN HYDROLYS 11G-40/45
30 LIQUID IN PACKET (ML) ORAL 2 TIMES DAILY
Status: DISCONTINUED | OUTPATIENT
Start: 2024-01-03 | End: 2024-01-08 | Stop reason: HOSPADM

## 2024-01-03 RX ADMIN — PIPERACILLIN SODIUM AND TAZOBACTAM SODIUM 4.5 G: 4; .5 INJECTION, SOLUTION INTRAVENOUS at 05:30

## 2024-01-03 RX ADMIN — METOPROLOL TARTRATE 50 MG: 50 TABLET ORAL at 21:56

## 2024-01-03 RX ADMIN — FUROSEMIDE 40 MG: 10 INJECTION, SOLUTION INTRAMUSCULAR; INTRAVENOUS at 14:19

## 2024-01-03 RX ADMIN — DEXMEDETOMIDINE HYDROCHLORIDE 0.3 MCG/KG/HR: 400 INJECTION, SOLUTION INTRAVENOUS at 22:39

## 2024-01-03 RX ADMIN — NICARDIPINE HYDROCHLORIDE 5 MG/HR: 25 INJECTION, SOLUTION INTRAVENOUS at 22:19

## 2024-01-03 RX ADMIN — AMLODIPINE BESYLATE 5 MG: 5 TABLET ORAL at 14:18

## 2024-01-03 RX ADMIN — NICARDIPINE HYDROCHLORIDE 5 MG/HR: 25 INJECTION, SOLUTION INTRAVENOUS at 00:47

## 2024-01-03 RX ADMIN — Medication 1 PATCH: at 10:57

## 2024-01-03 RX ADMIN — THIAMINE HCL TAB 100 MG 100 MG: 100 TAB at 10:57

## 2024-01-03 RX ADMIN — Medication 1 TABLET: at 10:57

## 2024-01-03 RX ADMIN — PIPERACILLIN SODIUM AND TAZOBACTAM SODIUM 4.5 G: 4; .5 INJECTION, SOLUTION INTRAVENOUS at 14:19

## 2024-01-03 RX ADMIN — DEXMEDETOMIDINE HYDROCHLORIDE 0.3 MCG/KG/HR: 400 INJECTION, SOLUTION INTRAVENOUS at 06:32

## 2024-01-03 RX ADMIN — LISINOPRIL 20 MG: 20 TABLET ORAL at 10:56

## 2024-01-03 RX ADMIN — Medication 10 ML: at 21:56

## 2024-01-03 RX ADMIN — Medication 10 ML: at 10:58

## 2024-01-03 RX ADMIN — METOPROLOL TARTRATE 50 MG: 50 TABLET ORAL at 10:57

## 2024-01-03 RX ADMIN — PIPERACILLIN SODIUM AND TAZOBACTAM SODIUM 4.5 G: 4; .5 INJECTION, SOLUTION INTRAVENOUS at 21:56

## 2024-01-03 NOTE — PROGRESS NOTES
Intensive Care Follow-up     Hospital:  LOS: 7 days   Mr. Chano Rees, 53 y.o. male is followed for:   ICH (intracerebral hemorrhage)            History of present illness:   53-year-old male with past medical history significant for hypertension and poor medical compliance.  Patient presented to emergency room on 12/27 with sudden onset of left-sided weakness and headache.  Patient had significantly elevated blood pressure readings in the emergency room.  CT head revealed 3 cm right thalamic ICH.  CTA was unremarkable and it was felt that patient with hypertensive bleed.  Patient was admitted to ICU for blood pressure control.  Patient apparently has history of alcoholism but was not drinking alcohol lately.  Patient had waxing and waning mental status.  Required respiratory assistance with BiPAP support.  He was having trouble with secretions and low-grade fever so empirically started on Zosyn therapy as well.      Subjective   Interval History:  Today morning patient re improved neurologically.  More awake and alert.  Left arm is weak. remains ains on Precedex drip and restraints have been taken off.  Remains on nicardipine drip as well.               The patient's past medical, surgical and social history were reviewed and updated in Epic as appropriate.       Objective     Infusions:  dexmedetomidine, 0.2-1.5 mcg/kg/hr, Last Rate: 0.3 mcg/kg/hr (01/03/24 0632)  niCARdipine, 5-15 mg/hr, Last Rate: 5 mg/hr (01/03/24 0048)      Medications:  lisinopril, 20 mg, Nasogastric, Q24H  metoprolol tartrate, 50 mg, Nasogastric, Q12H  multivitamin pediatric chewable, 1 tablet, Nasogastric, Daily  nicotine, 1 patch, Transdermal, Q24H  piperacillin-tazobactam, 4.5 g, Intravenous, Q8H  ProSource No Carb, 30 mL, Nasogastric, BID  sodium chloride, 10 mL, Intravenous, Q12H  thiamine, 100 mg, Nasogastric, Daily        Vital Sign Min/Max for last 24 hours  Temp  Min: 97.7 °F (36.5 °C)  Max: 99.1 °F (37.3 °C)   BP  Min: 100/68   "Max: 158/111   Pulse  Min: 61  Max: 81   Resp  Min: 20  Max: 20   SpO2  Min: 91 %  Max: 98 %   Flow (L/min)  Min: 6  Max: 6       Input/Output for last 24 hour shift  01/02 0701 - 01/03 0700  In: 3112.1 [I.V.:862.3]  Out: 3100 [Urine:3100]      Objective:  Vital signs: (most recent): Blood pressure 117/75, pulse 61, temperature 98.6 °F (37 °C), temperature source Axillary, resp. rate 20, height 180.3 cm (71\"), weight 122 kg (269 lb 13.5 oz), SpO2 92%.            General Appearance: More awake compared to yesterday  head:  Pupils reactive & symmetrical B/L.   Lungs:   B/L Breath sounds present with decreased breath sounds on bases, no wheezing heard, no crackles.   Heart: S1 and S2 present, no murmur  Abdomen: Soft, nontender, no guarding or rigidity, bowel sounds positive.  Extremities: + edema, warm to touch.  Neurologic: Left arm is weak      Results from last 7 days   Lab Units 01/03/24  0335 01/02/24  0530 01/01/24  0459   WBC 10*3/mm3 11.07* 11.33* 12.08*   HEMOGLOBIN g/dL 14.4 14.7 14.5   PLATELETS 10*3/mm3 227 201 232     Results from last 7 days   Lab Units 01/03/24  0335 01/02/24  0530 01/01/24  1756 01/01/24  0459 12/31/23  0405 12/30/23  0457   SODIUM mmol/L 140 141  --  139   < > 139   POTASSIUM mmol/L 4.5 4.6 4.1 3.4*   < > 4.2   CO2 mmol/L 22.0 19.0*  --  20.0*   < > 21.0*   BUN mg/dL 30* 30*  --  28*   < > 25*   CREATININE mg/dL 0.97 1.15  --  1.18   < > 1.40*   MAGNESIUM mg/dL 2.3  --   --  2.6  --  1.9   PHOSPHORUS mg/dL 3.4  --   --  2.7  --  4.7*   GLUCOSE mg/dL 108* 103*  --  97   < > 109*    < > = values in this interval not displayed.     Estimated Creatinine Clearance: 117.1 mL/min (by C-G formula based on SCr of 0.97 mg/dL).    Results from last 7 days   Lab Units 01/01/24  0530   PH, ARTERIAL pH units 7.425   PCO2, ARTERIAL mm Hg 32.7*   PO2 ART mm Hg 80.0*       Images:   None new    I reviewed the patient's results and images.     Echocardiogram reviewed  Interpretation Summary         " Left ventricular systolic function is normal. Calculated left ventricular EF = 55.6%    Left ventricular diastolic function is consistent with (grade I) impaired relaxation.    Saline test results are negative.    Assessment & Plan   Impression        Rt Thalamic ICH    Hypertension    Non-compliance    History of alcohol abuse       Plan        1.  Patient presented here with ICH.  Continue to keep systolic blood pressure less than 140.  Neurology team is signed off.  Will start patient on amlodipine 5 mg daily as well.  Continue lisinopril and metoprolol.  2.  Ongoing encephalopathy, etiology unclear.  On CIWA protocol.  On Precedex infusion as well. Continue thiamine supplementation.  No other evidence of metabolic reasons or sepsis picture currently though remains on Zosyn for possible aspiration. Procalcitonin level is normal. Urinalysis was negative for any acute UTI.  Toxicology screen showed benzodiazepines and no other controlled substances but patient was in the hospital for 2 days and may have received benzodiazepine prior to that.  3.  Patient is volume overloaded.  May be playing a role in his respiratory failure.  Echocardiogram showed diastolic dysfunction.  Will go ahead and diurese him further and try to keep him in a negative fluid balance. Lasix 40 mg IV x 1.   4.  Continue enteral nutrition through [place].  Tolerating well.  Monitor bowel function. Once more awake will have speech to reevaluate.   5.  PT/OT.  6.  BiPAP at nighttime and during daytime as needed.    Continue ICU monitoring and remains at risk of decline but showing some improvement.     Plan of care and goals reviewed with multidisciplinary/antibiotic stewardship team during rounds.   I discussed the patient's findings and my recommendations with nursing staff       Time spent  35 min  (exclusive of procedure time)  including high complexity decision making to assess, manipulate, and support vital organ system failure in this  individual who has impairment of one or more vital organ systems such that there is a high probability of imminent or life threatening deterioration in the patient’s condition.      Manuel Boone MD, [facility]  Pulmonary, Critical care and Sleep Medicine

## 2024-01-03 NOTE — PROGRESS NOTES
Enter Query Response Below      Query Response: Acute Kidney Injury              If applicable, please update the problem list.   Patient: Chaon Rees        : 1970  Account: [id number]           Admit Date:         How to Respond to this query:       a. Click New Note     b. Answer query within the yellow box.                c. Update the Problem List, if applicable.      If you have any questions about this query contact me at: [email or web address]     Dr. Ziegler,     53-year male patient with PMHx that includes Hypertension (noncompliance with medications) admitted 23 with Acute Right Basial Ganglia Intracerebral Hemorrhage with mild mass effect secondary to Hypertension, Hypertensive Emergency.     No baseline creatinine documented.     Labs: 23  Creatinine 1.20    Creatinine 1.44  Creatinine 1.53   Creatinine 1.30    Creatinine 1.40    Creatinine 1.15    Creatinine 0.97     Treatment has included Normal Saline 250ml IV bolus (), Normal Saline at 75ml/hour (-) Patient developed pulmonary edema, IV Hydralazine x1 () with IV Lasix started on .     Please clarify if patient treated/monitored for one or more of the following:  > Acute Kidney Injury   > Abnormal laboratory values of no significance  > Other- specify_____  > Unable to determine    By submitting this query, we are merely seeking further clarification of documentation to accurately reflect all conditions that you are monitoring, evaluating, treating or that extend the hospitalization or utilize additional resources of care. Please utilize your independent clinical judgment when addressing the question(s) above.     This query and your response, once completed, will be entered into the legal medical record.    Sincerely,  Kailey Gil RN, CCDS  Clinical Documentation Integrity Program   [email or web address]

## 2024-01-03 NOTE — CASE MANAGEMENT/SOCIAL WORK
Continued Stay Note  [facility]     Patient Name: Chano Rees  MRN: [id number]  Today's Date: 1/3/2024    Admit Date: 12/27/2023    Plan: [facility]   Discharge Plan       Row Name 01/03/24 1253       Plan    Plan [facility]    Patient/Family in Agreement with Plan yes    Plan Comments Spoke with patient and Amrita at bedside to discuss therapy recommendations for IRF and they requested referral to [facility].  Referral sent to Alex at [facility].  CM will continue to follow and assist with discharge plan.    Final Discharge Disposition Code 62 - inpatient rehab facility                   Discharge Codes    No documentation.                   Dasha Adhikari RN

## 2024-01-03 NOTE — MBS/VFSS/FEES
Acute Care - Speech Language Pathology Re-Evaluation and Treatment Note   [facility]  Fiberoptic Endoscopic Evaluation of Swallowing (FEES)     Patient Name: Chano Rees  : 1970  MRN: [id number]  Today's Date: 1/3/2024               Admit Date: 2023     Visit Dx:    ICD-10-CM ICD-9-CM   1. Intracranial hemorrhage  I62.9 432.9   2. Uncontrolled hypertension  I10 401.9   3. Personal history of noncompliance with medical treatment  Z91.199 V15.81   4. Cognitive communication deficit  R41.841 799.52   5. Oropharyngeal dysphagia  R13.12 787.22     Patient Active Problem List   Diagnosis    Rt Thalamic ICH    Hypertension    Non-compliance    History of alcohol abuse     Past Medical History:   Diagnosis Date    Hypertension      Past Surgical History:   Procedure Laterality Date    ANKLE SURGERY Left        SLP Recommendation and Plan              Swallow Criteria for Skilled Therapeutic Interventions Met: demonstrates skilled criteria (24 1100)  Anticipated Discharge Disposition (SLP): inpatient rehabilitation facility (24 1100)     Therapy Frequency (Swallow): 5 days per week (24 1100)     Predicted Duration Therapy Intervention (Days): until discharge (24 1100)  Oral Care Recommendations: Oral Care BID/PRN, Toothbrush (24 1100)                          Plan of Care Reviewed With: patient (24 131)  Progress: improving (24 131)             EDUCATION  The patient has been educated in the following areas:     Cognitive Impairment Communication Impairment.           SLP GOALS       Row Name 24 1100             (LTG) Patient will demonstrate functional swallow for    Diet Texture (Demonstrate functional swallow) regular textures  -SM      Liquid viscosity (Demonstrate functional swallow) thin liquids  -SM      [place] (Demonstrate functional swallow) independently (over 90% accuracy)  -SM      Time Frame (Demonstrate functional swallow) by discharge   -SM      Progress/Outcomes (Demonstrate functional swallow) goal revised this date  -SM         (STG) Patient will tolerate trials of    Consistencies Trialed (Tolerate trials) thin liquids  -SM      Desired Outcome (Tolerate trials) with use of compensatory strategies (see comments)  with small single cup/straw sips  -SM      [place] (Tolerate trials) independently (over 90% accuracy)  -SM      Time Frame (Tolerate trials) by discharge  -SM      Progress/Outcomes (Tolerate trials) goal revised this date  -SM         (STG) Labial Strengthening Goal 1 (SLP)    Activity (Labial Strengthening Goal 1, SLP) increase labial tone  -SM      Increase Labial Tone labial resistance exercises  -SM      [place]/Accuracy (Labial Strengthening Goal 1, SLP) with minimal cues (75-90% accuracy)  -SM      Time Frame (Labial Strengthening Goal 1, SLP) short term goal (STG);by discharge  -SM         (STG) Lingual Strengthening Goal 1 (SLP)    Activity (Lingual Strengthening Goal 1, SLP) increase lingual tone/sensation/control/coordination/movement  -SM      Increase Lingual Tone/Sensation/Control/Coordination/Movement swallow trials;lingual resistance exercises  -SM      [place]/Accuracy (Lingual Strengthening Goal 1, SLP) with minimal cues (75-90% accuracy)  -SM      Time Frame (Lingual Strengthening Goal 1, SLP) short term goal (STG)  -SM         (STG) Pharyngeal Strengthening Exercise Goal 1 (SLP)    Activity (Pharyngeal Strengthening Goal 1, SLP) increase timing;increase superior movement of the hyolaryngeal complex;increase anterior movement of the hyolaryngeal complex;increase closure at entrance to airway/closure of airway at glottis;increase squeeze/positive pressure generation  -SM      Increase Timing prepping - 3 second prep or suck swallow or 3-step swallow  -SM      Increase Superior Movement of the Hyolaryngeal Complex effortful pitch glide (falsetto + pharyngeal squeeze)  -SM      Increase Anterior  Movement of the Hyolaryngeal Complex chin tuck against resistance (CTAR)  -SM      Increase Closure at Entrance to Airway/Closure of Airway at Glottis supraglottic swallow  -SM      Increase Squeeze/Positive Pressure Generation hard effortful swallow  -SM      [place]/Accuracy (Pharyngeal Strengthening Goal 1, SLP) with minimal cues (75-90% accuracy)  -SM      Time Frame (Pharyngeal Strengthening Goal 1, SLP) short term goal (STG)  -SM      Progress/Outcomes (Pharyngeal Strengthening Goal 1, SLP) new goal  -SM         Patient will demonstrate functional speech skills for return to discharge environment    [place] Independently  -SM      Time frame by discharge  -SM      Progress/Outcomes continuing progress toward goal  -SM         Patient will demonstrate functional cognitive-linguistic skills for return to discharge environment    [place] with minimal cues  -SM      Time frame by discharge  -SM      Progress/Outcomes continuing progress toward goal  -SM         SLP Diagnostic Treatment     Patient will participate in further assessment in the following areas reading comprehension;graphic expression  -SM      Time Frame (Diagnostic) short term goal (STG)  -SM      Progress/Outcomes (Additional Goal 1, SLP) goal ongoing  -SM      Comment (Diagnostic) Focused this session on dysphagia with FEES. Target next session  -SM         Respiratory Support Goal 1 (SLP)    Improve Respiratory Support Goal 1 (SLP) repeating a sentence on exhalation;90%;with minimal cues (75-90%)  -SM      Time Frame (Respiratory Support Goal 1, SLP) short term goal (STG)  -SM      Barriers (Respiratory Support Goal 1, SLP) cognition/decreased awareness  -SM      Progress/Outcomes (Respiratory Support Goal 1, SLP) continuing progress toward goal  -SM         Articulation Goal 1 (SLP)    Improve Articulation Goal 1 (SLP) by over-articulating at word level;of specific sounds in words;with minimal cues (75-90%);90%  /p, b/  -SM       Time Frame (Articulation Goal 1, SLP) short term goal (STG)  -SM      Progress/Outcomes (Articulation Goal 1, SLP) goal ongoing  -SM         Patient Will Implement Strategies Goal 1 (SLP)    Implement Strategies of Goal 1 (SLP) using external rate control;90%;with minimal cues (75-90%)  -SM      Time Frame (Strategy Implementation Goal 1, SLP) short term goal (STG)  -SM      Progress/Outcomes (Strategy Implementation Goal 1, SLP) goal ongoing  -SM         Attention Goal 1 (SLP)    Improve Attention by Goal 1 (SLP) complete sustained attention task;90%;with minimal cues (75-90%)  -SM      Time Frame (Attention Goal 1, SLP) short term goal (STG)  -SM      Progress (Attention Goal 1, SLP) other (comment)  -SM      Progress/Outcomes (Attention Goal 1, SLP) good progress toward goal  -SM      Comment (Attention Goal 1, SLP) Met eye contact and attend to basic task. Pt easily distracts self from sustained attention tasks, 50% with min cues (when attempted increased cueing, decreased deficit awareness impacted).  -SM         Memory Skills Goal 1 (SLP)    Improve Memory Skills Through Goal 1 (SLP) recalling related word lists immediately;80%;with moderate cues (50-74%)  -SM      Time Frame (Memory Skills Goal 1, SLP) short term goal (STG)  -SM      Progress/Outcomes (Memory Skills Goal 1, SLP) goal ongoing  -SM         Reasoning Goal 1 (SLP)    Improve Reasoning Through Goal 1 (SLP) complete high level reasoning task;complete mental flexibility task;90%;with moderate cues (50-74%)  -SM      Time Frame (Reasoning Goal 1, SLP) short term goal (STG)  -SM      Progress/Outcomes (Reasoning Goal 1, SLP) goal ongoing  -SM         Functional Problem Solving Skills Goal 1 (SLP)    Improve Problem Solving Through Goal 1 (SLP) determine solutions to multifactorial problems;determine multiple solutions to problems;80%;with minimal cues (75-90%)  -SM      Time Frame (Problem Solving Goal 1, SLP) short term goal (STG)  -SM       Progress/Outcomes (Problem Solving Goal 1, SLP) goal ongoing  -SM         Pragmatic Skills Goal 1 (SLP)    Improve Pragmatic Skills Goal 1 (SLP) maintain eye contact;100%;independently (over 90% accuracy)  -SM      Time Frame (Pragmatic Skills Goal 1, SLP) short term goal (STG)  -SM      Progress/Outcomes (Pragmatic Skills Goal 1, SLP) goal met  -SM         Executive Functional Skills Goal 1 (SLP)    Improve Executive Function Skills Goal 1 (SLP) demonstrate awareness of deficit;identify strategies, strengths, limitations;exhibit cognitive flexibility;80%;with moderate cues (50-74%)  -SM      Time Frame (Executive Function Skills Goal 1, SLP) short term goal (STG)  -SM      Progress (Executive Function Skills Goal 1, SLP) 30%;with minimal cues (75-90%)  -SM      Progress/Outcomes (Executive Function Skills Goal 1, SLP) continuing progress toward goal  -SM         Right Hemisphere Function Goal 1 (SLP)    Improve Right Hemisphere Function Through Goal 1 (SLP) demonstrate awareness of communication partner in left visual field;perform self-monitoring;perform self-correction;80%;with minimal cues (75-90%)  -SM      Time Frame (Right Hemisphere Function Goal 1, SLP) short term goal (STG)  -SM      Progress/Outcomes (Right Hemisphere Function Goal 1, SLP) new goal;continuing progress toward goal  -SM      Comment (Right Hemisphere Function Goal 1, SLP) With cueing, attending to communication partner on L  -SM                User Key  (r) = Recorded By, (t) = Taken By, (c) = Cosigned By      Initials Name Provider Type    Tyra Hope MS CCC-SLP Speech and Language Pathologist                            Time Calculation:      Time Calculation- SLP       Row Name 01/03/24 1321             Time Calculation- SLP    SLP Start Time 1100  -      SLP Received On 01/03/24  -         Untimed Charges    24821-HF Eval Oral Pharyng Swallow Minutes 25  -SM      16211-PB Fiberoptic Endo Eval Swallow Minutes 75  -SM       74868-VA Treatment/ST Modification Prosth Aug Alter  15  -SM         Total Minutes    Untimed Charges Total Minutes 115  -SM       Total Minutes 115  -SM                User Key  (r) = Recorded By, (t) = Taken By, (c) = Cosigned By      Initials Name Provider Type    Tyra Hope, MS CCC-SLP Speech and Language Pathologist                    Therapy Charges for Today       Code Description Service Date Service Provider Modifiers Qty    55052833969 HC ST EVAL ORAL PHARYNG SWALLOW 2 1/3/2024 Tyra Perez, MS CCC-SLP GN 1    64363253097 HC ST FIBEROPTIC ENDO EVAL SWALL 5 1/3/2024 Tyra Perez, MS CCC-SLP GN 1    48518664097 HC ST TREATMENT SPEECH 1 1/3/2024 Tyra Perez, MS CCC-SLP GN 1                       Tyra ARCHULETA. MS Chris CCC-SLP  1/3/2024   and Acute Care - Speech Language Pathology   Swallow Re-Evaluation [facility]     Patient Name: Chano Rees  : 1970  MRN: [id number]  Today's Date: 1/3/2024               Admit Date: 2023    Visit Dx:     ICD-10-CM ICD-9-CM   1. Intracranial hemorrhage  I62.9 432.9   2. Uncontrolled hypertension  I10 401.9   3. Personal history of noncompliance with medical treatment  Z91.199 V15.81   4. Cognitive communication deficit  R41.841 799.52   5. Oropharyngeal dysphagia  R13.12 787.22     Patient Active Problem List   Diagnosis    Rt Thalamic ICH    Hypertension    Non-compliance    History of alcohol abuse     Past Medical History:   Diagnosis Date    Hypertension      Past Surgical History:   Procedure Laterality Date    ANKLE SURGERY Left        SLP Recommendation and Plan  SLP Swallowing Diagnosis: mild-moderate, oral dysphagia, moderate, pharyngeal dysphagia (24 1100)  SLP Diet Recommendation: regular textures, no mixed consistencies, honey thick liquids, ice chips between meals after oral care, with supervision (24 1100)  Recommended Precautions and Strategies: upright posture during/after eating,  general aspiration precautions (01/03/24 1100)  SLP Rec. for Method of Medication Administration: meds whole, with thick liquids, with puree (01/03/24 1100)     Monitor for Signs of Aspiration: notify SLP if any concerns (01/03/24 1100)     Swallow Criteria for Skilled Therapeutic Interventions Met: demonstrates skilled criteria (01/03/24 1100)  Anticipated Discharge Disposition (SLP): inpatient rehabilitation facility (01/03/24 1100)  Rehab Potential/Prognosis, Swallowing: good, to achieve stated therapy goals (01/03/24 1100)  Therapy Frequency (Swallow): 5 days per week (01/03/24 1100)  Predicted Duration Therapy Intervention (Days): until discharge (01/03/24 1100)  Oral Care Recommendations: Oral Care BID/PRN, Toothbrush (01/03/24 1100)                                      Oral Care Recommendations: Oral Care BID/PRN, Toothbrush (01/03/24 1100)    Plan of Care Reviewed With: patient  Progress: improving      SWALLOW EVALUATION (last 72 hours)       SLP Adult Swallow Evaluation       Row Name 01/03/24 1100                   Rehab Evaluation    Document Type re-evaluation;therapy note (daily note)  -SM        Subjective Information no complaints  -SM        Patient Observations alert;cooperative  -SM        Patient Effort good  -SM           General Information    Pertinent History Of Current Problem RN reported overall improved compared to yesterday. ready for re-assessment.  -SM        Current Method of Nutrition NPO;small-bore;nasogastric feedings  -SM        Plans/Goals Discussed with patient;spouse/S.O.;agreed upon  -SM        Barriers to Rehab none identified  -SM        Patient's Goals for Discharge return to all previous roles/activities  -        Family Goals for Discharge patient able to return to all previous activities/roles  -SM           Pain    Additional Documentation Pain Scale: FACES Pre/Post-Treatment (Group)  -SM           Pain Scale: FACES Pre/Post-Treatment    Pain: FACES Scale, Pretreatment  2-->hurts little bit  -SM        Posttreatment Pain Rating 2-->hurts little bit  -SM        Pain Location generalized  -SM        Pain Location - throat  -SM           General Eating/Swallowing Observations    Respiratory Support Currently in Use room air  -SM           Clinical Swallow Eval    Oral Prep Phase impaired  -SM        Pharyngeal Phase suspected pharyngeal impairment  -SM        Clinical Swallow Evaluation Summary Generalized weakness, intermittent multiple swallows. Risk aspiration. FEES followed this assessment. Continued NPO x ok PO meds in puree.  -SM           Fiberoptic Endoscopic Evaluation of Swallowing (FEES)    Risks/Benefits Reviewed risks/benefits explained;patient;family;agreed to eval  -SM        Nasal Entry right:  -SM        Scope serial number/identification [id number]  -           Anatomy and Physiology    Velopharyngeal WFL  -SM        Base of Tongue symmetrical;range adequate  -        Epiglottis WFL  -SM        Laryngeal Function Breathing decreased movement right  -SM        Laryngeal Function Phonation asymmetrical;decreased movement right  -SM        Laryngeal Function to Breath Hold TVF contact;sustained closure  -        Secretion Rating Scale (Angel et al. 1996) 1- secretions present around the laryngeal vestibule  -        Secretion Description thin;clear;white  -SM        Ice Chips DNA  -SM        Spontaneous Swallow frequency adequate  -SM        Sensory sensed scope  -SM        Utensils Used Spoon;Cup;Straw  -SM        Consistencies Trialed thin liquids;nectar-thick liquids;honey-thick liquids;pudding/puree;regular textures  -           FEES Interpretation    Oral Phase prolonged manipulation;increased A-P transit time;prespill of liquids into pharynx  -           Initiation of Pharyngeal Swallow    Initiation of Pharyngeal Swallow bolus in pyriform sinuses  -SM        Pharyngeal Phase impaired pharyngeal phase of swallowing  -SM        Penetration During the Swallow  thin liquids;nectar-thick liquids;secondary to delayed swallow initiation or mistiming;secondary to reduced laryngeal elevation;secondary to reduced vestibular closure  -SM        Aspiration After the Swallow thin liquids;secondary to residue;in laryngeal vestibule  -SM        Depth of Penetration deep  -SM        Response to Penetration No  -SM        No spontaneous response to penetration and effective laryngeal clearance with cue (see comments)  throat clearing  -SM        Response to Aspiration No  -SM        No spontaneous response to aspiration with effective subglottic clearance with cue (see comments);other (see comments)  cough  -SM        Rosenbek's Scale thin:;8-->Level 8;nectar:;5-->Level 5  -SM        Residue all consistencies tested;diffuse within pharynx;secondary to reduced base of tongue retraction;secondary to reduced posterior pharyngeal wall stripping;secondary to reduced laryngeal elevation;secondary to reduced hyolaryngeal excursion;other (see comments)  mild  -SM        Attempted Compensatory Maneuvers bolus size;other (see comments)  inconsistently prevented  -SM        Successful Compensatory Maneuver Competency patient unable to adequately demonstrate/teach back compensations  -SM        Pharyngeal Phase, Comment White secretions and laryngeal irritations, making assessment difficult as use white food items for study. Aspiration thin liquids, high risk with nectar-thick liquids, especially if not taking small sips. Aspiration silent. No aspiration with half tsp trials of thin liquids (i.e., ok ice chips between meals).  -SM           SLP Evaluation Clinical Impression    SLP Swallowing Diagnosis mild-moderate;oral dysphagia;moderate;pharyngeal dysphagia  -SM        Functional Impact risk of aspiration/pneumonia  -SM        Rehab Potential/Prognosis, Swallowing good, to achieve stated therapy goals  -SM        Swallow Criteria for Skilled Therapeutic Interventions Met demonstrates skilled  criteria  -           Recommendations    Therapy Frequency (Swallow) 5 days per week  -        Predicted Duration Therapy Intervention (Days) until discharge  -        SLP Diet Recommendation regular textures;no mixed consistencies;honey thick liquids;ice chips between meals after oral care, with supervision  -        Recommended Precautions and Strategies upright posture during/after eating;general aspiration precautions  -        Oral Care Recommendations Oral Care BID/PRN;Toothbrush  -        SLP Rec. for Method of Medication Administration meds whole;with thick liquids;with puree  -        Monitor for Signs of Aspiration notify SLP if any concerns  -        Anticipated Discharge Disposition (SLP) inpatient rehabilitation facility  -                  User Key  (r) = Recorded By, (t) = Taken By, (c) = Cosigned By      Initials Name Effective Dates    Tyra Hope MS CCC-SLP 02/03/23 -                     EDUCATION  The patient has been educated in the following areas:   Dysphagia (Swallowing Impairment) Modified Diet Instruction.        SLP GOALS       Row Name 01/03/24 1100             (LTG) Patient will demonstrate functional swallow for    Diet Texture (Demonstrate functional swallow) regular textures  -SM      Liquid viscosity (Demonstrate functional swallow) thin liquids  -SM      [place] (Demonstrate functional swallow) independently (over 90% accuracy)  -SM      Time Frame (Demonstrate functional swallow) by discharge  -      Progress/Outcomes (Demonstrate functional swallow) goal revised this date  -         (STG) Patient will tolerate trials of    Consistencies Trialed (Tolerate trials) thin liquids  -SM      Desired Outcome (Tolerate trials) with use of compensatory strategies (see comments)  with small single cup/straw sips  -SM      [place] (Tolerate trials) independently (over 90% accuracy)  -SM      Time Frame (Tolerate trials) by discharge  -       Progress/Outcomes (Tolerate trials) goal revised this date  -SM         (STG) Labial Strengthening Goal 1 (SLP)    Activity (Labial Strengthening Goal 1, SLP) increase labial tone  -SM      Increase Labial Tone labial resistance exercises  -SM      [place]/Accuracy (Labial Strengthening Goal 1, SLP) with minimal cues (75-90% accuracy)  -SM      Time Frame (Labial Strengthening Goal 1, SLP) short term goal (STG);by discharge  -SM         (STG) Lingual Strengthening Goal 1 (SLP)    Activity (Lingual Strengthening Goal 1, SLP) increase lingual tone/sensation/control/coordination/movement  -SM      Increase Lingual Tone/Sensation/Control/Coordination/Movement swallow trials;lingual resistance exercises  -SM      [place]/Accuracy (Lingual Strengthening Goal 1, SLP) with minimal cues (75-90% accuracy)  -SM      Time Frame (Lingual Strengthening Goal 1, SLP) short term goal (STG)  -SM         (STG) Pharyngeal Strengthening Exercise Goal 1 (SLP)    Activity (Pharyngeal Strengthening Goal 1, SLP) increase timing;increase superior movement of the hyolaryngeal complex;increase anterior movement of the hyolaryngeal complex;increase closure at entrance to airway/closure of airway at glottis;increase squeeze/positive pressure generation  -SM      Increase Timing prepping - 3 second prep or suck swallow or 3-step swallow  -SM      Increase Superior Movement of the Hyolaryngeal Complex effortful pitch glide (falsetto + pharyngeal squeeze)  -SM      Increase Anterior Movement of the Hyolaryngeal Complex chin tuck against resistance (CTAR)  -SM      Increase Closure at Entrance to Airway/Closure of Airway at Glottis supraglottic swallow  -SM      Increase Squeeze/Positive Pressure Generation hard effortful swallow  -SM      [place]/Accuracy (Pharyngeal Strengthening Goal 1, SLP) with minimal cues (75-90% accuracy)  -SM      Time Frame (Pharyngeal Strengthening Goal 1, SLP) short term goal (STG)  -SM       Progress/Outcomes (Pharyngeal Strengthening Goal 1, SLP) new goal  -SM         Patient will demonstrate functional speech skills for return to discharge environment    [place] Independently  -SM      Time frame by discharge  -SM      Progress/Outcomes continuing progress toward goal  -SM         Patient will demonstrate functional cognitive-linguistic skills for return to discharge environment    [place] with minimal cues  -SM      Time frame by discharge  -SM      Progress/Outcomes continuing progress toward goal  -SM         SLP Diagnostic Treatment     Patient will participate in further assessment in the following areas reading comprehension;graphic expression  -SM      Time Frame (Diagnostic) short term goal (STG)  -SM      Progress/Outcomes (Additional Goal 1, SLP) goal ongoing  -SM      Comment (Diagnostic) Focused this session on dysphagia with FEES. Target next session  -SM         Respiratory Support Goal 1 (SLP)    Improve Respiratory Support Goal 1 (SLP) repeating a sentence on exhalation;90%;with minimal cues (75-90%)  -SM      Time Frame (Respiratory Support Goal 1, SLP) short term goal (STG)  -SM      Barriers (Respiratory Support Goal 1, SLP) cognition/decreased awareness  -SM      Progress/Outcomes (Respiratory Support Goal 1, SLP) continuing progress toward goal  -SM         Articulation Goal 1 (SLP)    Improve Articulation Goal 1 (SLP) by over-articulating at word level;of specific sounds in words;with minimal cues (75-90%);90%  /p, b/  -SM      Time Frame (Articulation Goal 1, SLP) short term goal (STG)  -SM      Progress/Outcomes (Articulation Goal 1, SLP) goal ongoing  -SM         Patient Will Implement Strategies Goal 1 (SLP)    Implement Strategies of Goal 1 (SLP) using external rate control;90%;with minimal cues (75-90%)  -SM      Time Frame (Strategy Implementation Goal 1, SLP) short term goal (STG)  -SM      Progress/Outcomes (Strategy Implementation Goal 1, SLP) goal ongoing   -SM         Attention Goal 1 (SLP)    Improve Attention by Goal 1 (SLP) complete sustained attention task;90%;with minimal cues (75-90%)  -SM      Time Frame (Attention Goal 1, SLP) short term goal (STG)  -SM      Progress (Attention Goal 1, SLP) other (comment)  -SM      Progress/Outcomes (Attention Goal 1, SLP) good progress toward goal  -SM      Comment (Attention Goal 1, SLP) Met eye contact and attend to basic task. Pt easily distracts self from sustained attention tasks, 50% with min cues (when attempted increased cueing, decreased deficit awareness impacted).  -SM         Memory Skills Goal 1 (SLP)    Improve Memory Skills Through Goal 1 (SLP) recalling related word lists immediately;80%;with moderate cues (50-74%)  -SM      Time Frame (Memory Skills Goal 1, SLP) short term goal (STG)  -SM      Progress/Outcomes (Memory Skills Goal 1, SLP) goal ongoing  -         Reasoning Goal 1 (SLP)    Improve Reasoning Through Goal 1 (SLP) complete high level reasoning task;complete mental flexibility task;90%;with moderate cues (50-74%)  -SM      Time Frame (Reasoning Goal 1, SLP) short term goal (STG)  -SM      Progress/Outcomes (Reasoning Goal 1, SLP) goal ongoing  -SM         Functional Problem Solving Skills Goal 1 (SLP)    Improve Problem Solving Through Goal 1 (SLP) determine solutions to multifactorial problems;determine multiple solutions to problems;80%;with minimal cues (75-90%)  -SM      Time Frame (Problem Solving Goal 1, SLP) short term goal (STG)  -SM      Progress/Outcomes (Problem Solving Goal 1, SLP) goal ongoing  -SM         Pragmatic Skills Goal 1 (SLP)    Improve Pragmatic Skills Goal 1 (SLP) maintain eye contact;100%;independently (over 90% accuracy)  -SM      Time Frame (Pragmatic Skills Goal 1, SLP) short term goal (STG)  -SM      Progress/Outcomes (Pragmatic Skills Goal 1, SLP) goal met  -         Executive Functional Skills Goal 1 (SLP)    Improve Executive Function Skills Goal 1 (SLP)  demonstrate awareness of deficit;identify strategies, strengths, limitations;exhibit cognitive flexibility;80%;with moderate cues (50-74%)  -SM      Time Frame (Executive Function Skills Goal 1, SLP) short term goal (STG)  -SM      Progress (Executive Function Skills Goal 1, SLP) 30%;with minimal cues (75-90%)  -SM      Progress/Outcomes (Executive Function Skills Goal 1, SLP) continuing progress toward goal  -SM         Right Hemisphere Function Goal 1 (SLP)    Improve Right Hemisphere Function Through Goal 1 (SLP) demonstrate awareness of communication partner in left visual field;perform self-monitoring;perform self-correction;80%;with minimal cues (75-90%)  -SM      Time Frame (Right Hemisphere Function Goal 1, SLP) short term goal (STG)  -SM      Progress/Outcomes (Right Hemisphere Function Goal 1, SLP) new goal;continuing progress toward goal  -SM      Comment (Right Hemisphere Function Goal 1, SLP) With cueing, attending to communication partner on L  -SM                User Key  (r) = Recorded By, (t) = Taken By, (c) = Cosigned By      Initials Name Provider Type    Tyra Hope MS CCC-SLP Speech and Language Pathologist                       Time Calculation:    Time Calculation- SLP       Row Name 01/03/24 1321             Time Calculation- SLP    SLP Start Time 1100  -SM      SLP Received On 01/03/24  -SM         Untimed Charges    09166-FA Eval Oral Pharyng Swallow Minutes 25  -SM      04605-TE Fiberoptic Endo Eval Swallow Minutes 75  -SM      65651-LW Treatment/ST Modification Prosth Aug Alter  15  -SM         Total Minutes    Untimed Charges Total Minutes 115  -SM       Total Minutes 115  -SM                User Key  (r) = Recorded By, (t) = Taken By, (c) = Cosigned By      Initials Name Provider Type    Tyra Hope MS CCC-SLP Speech and Language Pathologist                    Therapy Charges for Today       Code Description Service Date Service Provider Modifiers Qty     22898933260 HC ST EVAL ORAL PHARYNG SWALLOW 2 1/3/2024 Tyra Perez, MS CCC-SLP GN 1    69094586993 HC ST FIBEROPTIC ENDO EVAL SWALL 5 1/3/2024 Tyra Perez, MS CCC-SLP GN 1    84210540669 HC ST TREATMENT SPEECH 1 1/3/2024 Tyra Perez, MS CCC-SLP GN 1                 Tyra Perez, MS CCC-SLP  1/3/2024

## 2024-01-03 NOTE — PLAN OF CARE
Goal Outcome Evaluation:  Plan of Care Reviewed With: patient, significant other        Progress: improving  Outcome Evaluation: Pt showing improvement as he progressed with mobility performing STS x2 reps with max Ax2, limtied by L knee buckling and balance deficits compared to baseline. d/c rec for IRF.      Anticipated Discharge Disposition (PT): inpatient rehabilitation facility

## 2024-01-03 NOTE — THERAPY TREATMENT NOTE
Patient Name: Chano Rees  : 1970    MRN: [id number]                              Today's Date: 1/3/2024       Admit Date: 2023    Visit Dx:     ICD-10-CM ICD-9-CM   1. Intracranial hemorrhage  I62.9 432.9   2. Uncontrolled hypertension  I10 401.9   3. Personal history of noncompliance with medical treatment  Z91.199 V15.81   4. Cognitive communication deficit  R41.841 799.52   5. Oropharyngeal dysphagia  R13.12 787.22     Patient Active Problem List   Diagnosis    Rt Thalamic ICH    Hypertension    Non-compliance    History of alcohol abuse     Past Medical History:   Diagnosis Date    Hypertension      Past Surgical History:   Procedure Laterality Date    ANKLE SURGERY Left       General Information       Row Name 24 1412          Physical Therapy Time and Intention    Document Type therapy note (daily note)  -AY     Mode of Treatment physical therapy  -AY       Row Name 24 1412          General Information    Patient Profile Reviewed yes  -AY     Existing Precautions/Restrictions fall;other (see comments)  L neglect/L sided weakness  -AY     Barriers to Rehab medically complex;cognitive status;visual deficit  -AY       Row Name 24 1412          Cognition    Orientation Status (Cognition) oriented x 3  -AY       Row Name 24 1412          Safety Issues, Functional Mobility    Safety Issues Affecting Function (Mobility) ability to follow commands;insight into deficits/self-awareness;sequencing abilities;safety precautions follow-through/compliance;awareness of need for assistance;safety precaution awareness  -AY     Impairments Affecting Function (Mobility) balance;cognition;coordination;endurance/activity tolerance;motor control;motor planning;postural/trunk control;range of motion (ROM);strength;visual/perceptual  -AY     Cognitive Impairments, Mobility Safety/Performance attention;awareness, need for assistance;safety precaution awareness;safety precaution  follow-through;insight into deficits/self-awareness  -AY               User Key  (r) = Recorded By, (t) = Taken By, (c) = Cosigned By      Initials Name Provider Type    Fabby Quiroga PT Physical Therapist                   Mobility       Row Name 01/03/24 1413          Bed Mobility    Bed Mobility rolling right;rolling left  -AY     Rolling Left Powers (Bed Mobility) maximum assist (25% patient effort);2 person assist;verbal cues  -AY     Rolling Right Powers (Bed Mobility) maximum assist (25% patient effort);2 person assist;verbal cues  -AY     Scooting/Bridging Powers (Bed Mobility) dependent (less than 25% patient effort);2 person assist;verbal cues  -AY     Assistive Device (Bed Mobility) draw sheet;bed rails  -AY     Comment, (Bed Mobility) cueing for sequencing and hand placement.  -AY       Row Name 01/03/24 1413          Sit-Stand Transfer    Sit-Stand Powers (Transfers) maximum assist (25% patient effort);2 person assist;verbal cues;nonverbal cues (demo/gesture)  -AY     Assistive Device (Sit-Stand Transfers) other (see comments)  BUE support  -AY     Comment, (Sit-Stand Transfer) performed x2 reps.  -AY       Row Name 01/03/24 1413          Gait/Stairs (Locomotion)    Powers Level (Gait) not tested  -AY               User Key  (r) = Recorded By, (t) = Taken By, (c) = Cosigned By      Initials Name Provider Type    Fabby Quiroga PT Physical Therapist                   Obj/Interventions       Row Name 01/03/24 1415          Motor Skills    Therapeutic Exercise shoulder;ankle;hip  pt/family educated about performing HEP  -AY       Row Name 01/03/24 1415          Shoulder (Therapeutic Exercise)    Shoulder AAROM (Therapeutic Exercise) right assist left;flexion;sitting;10 repetitions  -AY       Row Name 01/03/24 1415          Hip (Therapeutic Exercise)    Hip (Therapeutic Exercise) AROM (active range of motion)  -AY     Hip AROM (Therapeutic Exercise) left;flexion;10  repetitions;sitting  -AY       Row Name 01/03/24 1415          Ankle (Therapeutic Exercise)    Ankle (Therapeutic Exercise) AROM (active range of motion)  -AY     Ankle AROM (Therapeutic Exercise) left;dorsiflexion;plantarflexion;10 repetitions;sitting  -AY       Row Name 01/03/24 1415          Balance    Balance Assessment sitting static balance;sitting dynamic balance;sit to stand dynamic balance;standing static balance  -AY     Static Sitting Balance contact guard  -AY     Dynamic Sitting Balance minimal assist  -AY     Position, Sitting Balance unsupported;sitting in chair  -AY     Sit to Stand Dynamic Balance maximum assist  -AY     Static Standing Balance maximum assist  -AY     Position/Device Used, Standing Balance supported  -AY     Comment, Balance L lateral lean with upright mobility  -AY       Row Name 01/03/24 1415          Wrist (Therapeutic Exercise)    Wrist (Therapeutic Exercise) AAROM (active assistive range of motion)  -AY     Wrist AAROM (Therapeutic Exercise) right assist left;flexion;extension;10 repetitions  -AY       Row Name 01/03/24 1415          Hand (Therapeutic Exercise)    Hand AROM/AAROM (Therapeutic Exercise) AAROM (active assistive range of motion);finger flexion;finger extension;left;10 repetitions  -AY               User Key  (r) = Recorded By, (t) = Taken By, (c) = Cosigned By      Initials Name Provider Type    AY Fabby Villarreal, PT Physical Therapist                   Goals/Plan    No documentation.                  Clinical Impression       Row Name 01/03/24 1418          Pain    Pretreatment Pain Rating 0/10 - no pain  -AY     Posttreatment Pain Rating 0/10 - no pain  -AY     Pain Intervention(s) Ambulation/increased activity;Repositioned  -AY     Additional Documentation Pain Scale: Numbers Pre/Post-Treatment (Group)  -AY       Row Name 01/03/24 1418          Plan of Care Review    Plan of Care Reviewed With patient;significant other  -AY     Progress improving  -AY      Outcome Evaluation Pt showing improvement as he progressed with mobility performing STS x2 reps with max Ax2, limtied by L knee buckling and balance deficits compared to baseline. d/c rec for IRF.  -AY       Row Name 01/03/24 1418          Vital Signs    O2 Delivery Pre Treatment room air  -AY     O2 Delivery Intra Treatment room air  -AY     O2 Delivery Post Treatment room air  -AY     Pre Patient Position Supine  -AY     Intra Patient Position Standing  -AY     Post Patient Position Sitting  -AY       Row Name 01/03/24 1418          Positioning and Restraints    Pre-Treatment Position in bed  -AY     Post Treatment Position chair  -AY     In Chair notified nsg;reclined;sitting;call light within reach;encouraged to call for assist;with family/caregiver;exit alarm on;legs elevated;LUE elevated;RUE elevated;waffle cushion;on mechanical lift sling  -AY               User Key  (r) = Recorded By, (t) = Taken By, (c) = Cosigned By      Initials Name Provider Type    Fabby Quiroga, PT Physical Therapist                   Outcome Measures       Row Name 01/03/24 1410          How much help from another person do you currently need...    Turning from your back to your side while in flat bed without using bedrails? 2  -AY     Moving from lying on back to sitting on the side of a flat bed without bedrails? 2  -AY     Moving to and from a bed to a chair (including a wheelchair)? 1  -AY     Standing up from a chair using your arms (e.g., wheelchair, bedside chair)? 2  -AY     Climbing 3-5 steps with a railing? 1  -AY     To walk in hospital room? 1  -AY     AM-PAC 6 Clicks Score (PT) 9  -AY     Highest Level of Mobility Goal 3 --> Sit at edge of bed  -AY       Row Name 01/03/24 1419          Modified [place] Scale    Modified [place] Scale 4 - Moderately severe disability.  Unable to walk without assistance, and unable to attend to own bodily needs without assistance.  -AY       Row Name 01/03/24 1419          Functional  Assessment    Outcome Measure Options AM-PAC 6 Clicks Basic Mobility (PT);Modified [place]  -AY               User Key  (r) = Recorded By, (t) = Taken By, (c) = Cosigned By      Initials Name Provider Type    AY Fabby Villarreal, PT Physical Therapist                                 Physical Therapy Education       Title: PT OT SLP Therapies (In Progress)       Topic: Physical Therapy (In Progress)       Point: Mobility training (In Progress)       Learning Progress Summary             Patient Acceptance, E,TB, NR by AY at 1/3/2024 1420    Acceptance, E,TB, NR by AY at 1/1/2024 1402    Acceptance, E, NR,VU by ZS at 12/29/2023 0752    Acceptance, E, NR by KG at 12/28/2023 0845   Family Acceptance, E, NR,VU by ZS at 12/29/2023 0752                         Point: Home exercise program (In Progress)       Learning Progress Summary             Patient Acceptance, E,TB, NR by AY at 1/3/2024 1420    Acceptance, E,TB, NR by AY at 1/1/2024 1402                         Point: Body mechanics (In Progress)       Learning Progress Summary             Patient Acceptance, E,TB, NR by AY at 1/3/2024 1420    Acceptance, E,TB, NR by AY at 1/1/2024 1402    Acceptance, E, NR by KG at 12/28/2023 0845                         Point: Precautions (In Progress)       Learning Progress Summary             Patient Acceptance, E,TB, NR by AY at 1/3/2024 1420    Acceptance, E,TB, NR by AY at 1/1/2024 1402    Acceptance, E, NR by KG at 12/28/2023 0845                                         User Key       Initials Effective Dates Name Provider Type Discipline    KG 05/22/20 -  Katina Anderson, PT Physical Therapist PT    ZS 02/13/23 -  Jerald Spencer, RN Registered Nurse Nurse    AY 11/10/20 -  Fabby Villarreal, EUGENE Physical Therapist PT                  PT Recommendation and Plan     Plan of Care Reviewed With: patient, significant other  Progress: improving  Outcome Evaluation: Pt showing improvement as he progressed with mobility performing STS  x2 reps with max Ax2, limtied by L knee buckling and balance deficits compared to baseline. d/c rec for IRF.     Time Calculation:         PT Charges       Row Name 01/03/24 1420             Time Calculation    Start Time 1018  -AY      PT Received On 01/03/24  -AY         Timed Charges    27260 - PT Therapeutic Exercise Minutes 30  -AY      75820 - PT Therapeutic Activity Minutes 30  -AY         Total Minutes    Timed Charges Total Minutes 60  -AY       Total Minutes 60  -AY                User Key  (r) = Recorded By, (t) = Taken By, (c) = Cosigned By      Initials Name Provider Type    AY Fabby Villarreal PT Physical Therapist                  Therapy Charges for Today       Code Description Service Date Service Provider Modifiers Qty    04774361363 HC PT THER PROC EA 15 MIN 1/3/2024 Fabby Villarreal, PT GP 2    82620492846 HC PT THERAPEUTIC ACT EA 15 MIN 1/3/2024 Fabby Villarreal, PT GP 2            PT G-Codes  Outcome Measure Options: AM-PAC 6 Clicks Basic Mobility (PT), Modified [place]  AM-PAC 6 Clicks Score (PT): 9  AM-PAC 6 Clicks Score (OT): 6  Modified [place] Scale: 4 - Moderately severe disability.  Unable to walk without assistance, and unable to attend to own bodily needs without assistance.  PT Discharge Summary  Anticipated Discharge Disposition (PT): inpatient rehabilitation facility    Fabby Villarreal PT  1/3/2024

## 2024-01-03 NOTE — PLAN OF CARE
Goal Outcome Evaluation:  Plan of Care Reviewed With: patient        Progress: improving       SLP re-evaluation and treatment completed. FEES revealed safe for regular solids, no mixed consistencies, honey-thick liquids, and ice chips between meals. Will continue to address dysphagia and cognitive-communication deficits. Please see note for further details and recommendations.        Anticipated Discharge Disposition (SLP): inpatient rehabilitation facility

## 2024-01-04 LAB
ANION GAP SERPL CALCULATED.3IONS-SCNC: 11 MMOL/L (ref 5–15)
BASOPHILS # BLD AUTO: 0.09 10*3/MM3 (ref 0–0.2)
BASOPHILS NFR BLD AUTO: 0.7 % (ref 0–1.5)
BUN SERPL-MCNC: 26 MG/DL (ref 6–20)
BUN/CREAT SERPL: 22.2 (ref 7–25)
CALCIUM SPEC-SCNC: 9.4 MG/DL (ref 8.6–10.5)
CHLORIDE SERPL-SCNC: 104 MMOL/L (ref 98–107)
CO2 SERPL-SCNC: 23 MMOL/L (ref 22–29)
CREAT SERPL-MCNC: 1.17 MG/DL (ref 0.76–1.27)
DEPRECATED RDW RBC AUTO: 48.6 FL (ref 37–54)
EGFRCR SERPLBLD CKD-EPI 2021: 74.5 ML/MIN/1.73
EOSINOPHIL # BLD AUTO: 0.43 10*3/MM3 (ref 0–0.4)
EOSINOPHIL NFR BLD AUTO: 3.2 % (ref 0.3–6.2)
ERYTHROCYTE [DISTWIDTH] IN BLOOD BY AUTOMATED COUNT: 14.4 % (ref 12.3–15.4)
GLUCOSE SERPL-MCNC: 107 MG/DL (ref 65–99)
HCT VFR BLD AUTO: 47.5 % (ref 37.5–51)
HGB BLD-MCNC: 15.5 G/DL (ref 13–17.7)
IMM GRANULOCYTES # BLD AUTO: 0.25 10*3/MM3 (ref 0–0.05)
IMM GRANULOCYTES NFR BLD AUTO: 1.9 % (ref 0–0.5)
LYMPHOCYTES # BLD AUTO: 1.95 10*3/MM3 (ref 0.7–3.1)
LYMPHOCYTES NFR BLD AUTO: 14.7 % (ref 19.6–45.3)
MCH RBC QN AUTO: 30 PG (ref 26.6–33)
MCHC RBC AUTO-ENTMCNC: 32.6 G/DL (ref 31.5–35.7)
MCV RBC AUTO: 92.1 FL (ref 79–97)
MONOCYTES # BLD AUTO: 1.47 10*3/MM3 (ref 0.1–0.9)
MONOCYTES NFR BLD AUTO: 11.1 % (ref 5–12)
NEUTROPHILS NFR BLD AUTO: 68.4 % (ref 42.7–76)
NEUTROPHILS NFR BLD AUTO: 9.05 10*3/MM3 (ref 1.7–7)
NRBC BLD AUTO-RTO: 0 /100 WBC (ref 0–0.2)
PLATELET # BLD AUTO: 271 10*3/MM3 (ref 140–450)
PMV BLD AUTO: 11.1 FL (ref 6–12)
POTASSIUM SERPL-SCNC: 4.5 MMOL/L (ref 3.5–5.2)
RBC # BLD AUTO: 5.16 10*6/MM3 (ref 4.14–5.8)
SODIUM SERPL-SCNC: 138 MMOL/L (ref 136–145)
WBC NRBC COR # BLD AUTO: 13.24 10*3/MM3 (ref 3.4–10.8)

## 2024-01-04 PROCEDURE — 80048 BASIC METABOLIC PNL TOTAL CA: CPT | Performed by: INTERNAL MEDICINE

## 2024-01-04 PROCEDURE — 94799 UNLISTED PULMONARY SVC/PX: CPT

## 2024-01-04 PROCEDURE — 25810000003 SODIUM CHLORIDE 0.9 % SOLUTION 250 ML FLEX CONT: Performed by: INTERNAL MEDICINE

## 2024-01-04 PROCEDURE — 25010000002 PIPERACILLIN SOD-TAZOBACTAM PER 1 G: Performed by: INTERNAL MEDICINE

## 2024-01-04 PROCEDURE — 99232 SBSQ HOSP IP/OBS MODERATE 35: CPT | Performed by: INTERNAL MEDICINE

## 2024-01-04 PROCEDURE — 97530 THERAPEUTIC ACTIVITIES: CPT

## 2024-01-04 PROCEDURE — 97110 THERAPEUTIC EXERCISES: CPT

## 2024-01-04 PROCEDURE — 25010000002 NICARDIPINE 2.5 MG/ML SOLUTION 10 ML VIAL: Performed by: INTERNAL MEDICINE

## 2024-01-04 PROCEDURE — 85025 COMPLETE CBC W/AUTO DIFF WBC: CPT | Performed by: INTERNAL MEDICINE

## 2024-01-04 PROCEDURE — 25010000002 LABETALOL 5 MG/ML SOLUTION: Performed by: NURSE PRACTITIONER

## 2024-01-04 RX ORDER — AMLODIPINE BESYLATE 10 MG/1
10 TABLET ORAL
Status: DISCONTINUED | OUTPATIENT
Start: 2024-01-05 | End: 2024-01-08 | Stop reason: HOSPADM

## 2024-01-04 RX ORDER — AMLODIPINE BESYLATE 5 MG/1
5 TABLET ORAL ONCE
Status: COMPLETED | OUTPATIENT
Start: 2024-01-04 | End: 2024-01-04

## 2024-01-04 RX ORDER — LABETALOL HYDROCHLORIDE 5 MG/ML
20 INJECTION, SOLUTION INTRAVENOUS EVERY 6 HOURS PRN
Status: DISCONTINUED | OUTPATIENT
Start: 2024-01-04 | End: 2024-01-08 | Stop reason: HOSPADM

## 2024-01-04 RX ORDER — CHOLECALCIFEROL (VITAMIN D3) 125 MCG
5 CAPSULE ORAL NIGHTLY PRN
Status: DISCONTINUED | OUTPATIENT
Start: 2024-01-04 | End: 2024-01-05

## 2024-01-04 RX ADMIN — PIPERACILLIN SODIUM AND TAZOBACTAM SODIUM 4.5 G: 4; .5 INJECTION, SOLUTION INTRAVENOUS at 13:44

## 2024-01-04 RX ADMIN — PIPERACILLIN SODIUM AND TAZOBACTAM SODIUM 4.5 G: 4; .5 INJECTION, SOLUTION INTRAVENOUS at 22:02

## 2024-01-04 RX ADMIN — THIAMINE HCL TAB 100 MG 100 MG: 100 TAB at 08:32

## 2024-01-04 RX ADMIN — LISINOPRIL 20 MG: 20 TABLET ORAL at 08:32

## 2024-01-04 RX ADMIN — LABETALOL HYDROCHLORIDE 20 MG: 5 INJECTION INTRAVENOUS at 11:07

## 2024-01-04 RX ADMIN — NICARDIPINE HYDROCHLORIDE 10 MG/HR: 25 INJECTION, SOLUTION INTRAVENOUS at 22:00

## 2024-01-04 RX ADMIN — NICARDIPINE HYDROCHLORIDE 2.5 MG/HR: 25 INJECTION, SOLUTION INTRAVENOUS at 05:58

## 2024-01-04 RX ADMIN — AMLODIPINE BESYLATE 5 MG: 5 TABLET ORAL at 08:32

## 2024-01-04 RX ADMIN — METOPROLOL TARTRATE 50 MG: 50 TABLET ORAL at 08:32

## 2024-01-04 RX ADMIN — PIPERACILLIN SODIUM AND TAZOBACTAM SODIUM 4.5 G: 4; .5 INJECTION, SOLUTION INTRAVENOUS at 05:59

## 2024-01-04 RX ADMIN — Medication 10 ML: at 08:33

## 2024-01-04 RX ADMIN — Medication 1 TABLET: at 08:31

## 2024-01-04 RX ADMIN — METOPROLOL TARTRATE 50 MG: 50 TABLET ORAL at 21:09

## 2024-01-04 RX ADMIN — AMLODIPINE BESYLATE 5 MG: 5 TABLET ORAL at 13:43

## 2024-01-04 RX ADMIN — Medication 5 MG: at 23:02

## 2024-01-04 RX ADMIN — NICARDIPINE HYDROCHLORIDE 10 MG/HR: 25 INJECTION, SOLUTION INTRAVENOUS at 16:35

## 2024-01-04 RX ADMIN — Medication 1 PATCH: at 08:34

## 2024-01-04 RX ADMIN — Medication 10 ML: at 21:09

## 2024-01-04 NOTE — PROGRESS NOTES
Intensive Care Follow-up     Hospital:  LOS: 8 days   Mr. Chano Rees, 53 y.o. male is followed for:   ICH (intracerebral hemorrhage)            History of present illness:   53-year-old male with past medical history significant for hypertension and poor medical compliance.  Patient presented to emergency room on 12/27 with sudden onset of left-sided weakness and headache.  Patient had significantly elevated blood pressure readings in the emergency room.  CT head revealed 3 cm right thalamic ICH.  CTA was unremarkable and it was felt that patient with hypertensive bleed.  Patient was admitted to ICU for blood pressure control.  Patient apparently has history of alcoholism but was not drinking alcohol lately.  Patient had waxing and waning mental status.  Required respiratory assistance with BiPAP support.  He was having trouble with secretions and low-grade fever so empirically started on Zosyn therapy as well.    Patient was up to encephalopathy requiring Precedex infusion but slowly improving.    Subjective   Interval History:  Overnight no acute events.  Today morning patient seen sitting out in chair.  Alert and oriented.  Denies any other acute issues.  Nicardipine drip has been turned off.  Will adjust oral medications.  Goal systolic blood pressure less than 150.               The patient's past medical, surgical and social history were reviewed and updated in Epic as appropriate.       Objective     Infusions:  dexmedetomidine, 0.2-1.5 mcg/kg/hr, Last Rate: Stopped (01/04/24 0810)  niCARdipine, 5-15 mg/hr, Last Rate: Stopped (01/04/24 0920)      Medications:  [START ON 1/5/2024] amLODIPine, 10 mg, Oral, Q24H  amLODIPine, 5 mg, Oral, Once  lisinopril, 20 mg, Oral, Q24H  metoprolol tartrate, 50 mg, Oral, Q12H  multivitamin pediatric chewable, 1 tablet, Oral, Daily  nicotine, 1 patch, Transdermal, Q24H  piperacillin-tazobactam, 4.5 g, Intravenous, Q8H  ProSource No Carb, 30 mL, Oral, BID  sodium chloride,  "10 mL, Intravenous, Q12H  thiamine, 100 mg, Oral, Daily        Vital Sign Min/Max for last 24 hours  Temp  Min: 97.8 °F (36.6 °C)  Max: 98.3 °F (36.8 °C)   BP  Min: 97/79  Max: 150/104   Pulse  Min: 56  Max: 84   Resp  Min: 18  Max: 20   SpO2  Min: 91 %  Max: 100 %   No data recorded       Input/Output for last 24 hour shift  01/03 0701 - 01/04 0700  In: 1581.5 [P.O.:240; I.V.:1141.5]  Out: 3925 [Urine:3925]      Objective:  Vital signs: (most recent): Blood pressure 145/89, pulse 74, temperature 97.9 °F (36.6 °C), temperature source Oral, resp. rate 20, height 180.3 cm (71\"), weight 117 kg (257 lb 15 oz), SpO2 98%.            General Appearance: Awake and alert and oriented  head:  Pupils reactive & symmetrical B/L.   Lungs:   B/L Breath sounds present with decreased breath sounds on bases, no wheezing heard, no crackles.   Heart: S1 and S2 present, no murmur  Abdomen: Soft, nontender, no guarding or rigidity, bowel sounds positive.  Extremities: + edema, warm to touch.  Neurologic: Weakness left upper extremity, moving lower extremities well.      Results from last 7 days   Lab Units 01/04/24  0425 01/03/24  0335 01/02/24  0530   WBC 10*3/mm3 13.24* 11.07* 11.33*   HEMOGLOBIN g/dL 15.5 14.4 14.7   PLATELETS 10*3/mm3 271 227 201     Results from last 7 days   Lab Units 01/04/24  0425 01/03/24  0335 01/02/24  0530 01/01/24  1756 01/01/24  0459 12/31/23  0405 12/30/23  0457   SODIUM mmol/L 138 140 141  --  139   < > 139   POTASSIUM mmol/L 4.5 4.5 4.6   < > 3.4*   < > 4.2   CO2 mmol/L 23.0 22.0 19.0*  --  20.0*   < > 21.0*   BUN mg/dL 26* 30* 30*  --  28*   < > 25*   CREATININE mg/dL 1.17 0.97 1.15  --  1.18   < > 1.40*   MAGNESIUM mg/dL  --  2.3  --   --  2.6  --  1.9   PHOSPHORUS mg/dL  --  3.4  --   --  2.7  --  4.7*   GLUCOSE mg/dL 107* 108* 103*  --  97   < > 109*    < > = values in this interval not displayed.     Estimated Creatinine Clearance: 95 mL/min (by C-G formula based on SCr of 1.17 mg/dL).    Results " from last 7 days   Lab Units 01/01/24  0530   PH, ARTERIAL pH units 7.425   PCO2, ARTERIAL mm Hg 32.7*   PO2 ART mm Hg 80.0*       Images:   None new    I reviewed the patient's results and images.     Echocardiogram reviewed  Interpretation Summary         Left ventricular systolic function is normal. Calculated left ventricular EF = 55.6%    Left ventricular diastolic function is consistent with (grade I) impaired relaxation.    Saline test results are negative.    Assessment & Plan   Impression        Rt Thalamic ICH    Hypertension    Non-compliance    History of alcohol abuse       Plan        1.  Patient presented here with ICH.  Continue to keep systolic blood pressure less than 140.  Neurology team is signed off.  Adjust oral antihypertensives.  Increase amlodipine to 10 mg daily.  Continue lisinopril and metoprolol.  Goal blood pressure  systolic less than 150.  As needed nicardipine.  2.  Ongoing encephalopathy which is improving.  Not requiring Precedex infusion currently.  More alert and oriented.  Continue thiamine supplementation.  Patient was treated for possible aspiration pneumonia with Zosyn.  Will finish off antibiotics.  Mild leukocytosis noted.  No other focus of infection at this time.  3.  Patient was diuresed yesterday and did well.  Urine output remains good.  Currently on room air.  Monitor closely for now.  Replace electrolytes per ICU protocol as needed.    4.  Speech evaluation and if he does well we will start oral nutrition..   5.  PT/OT.  6.  BiPAP at nighttime and during daytime as needed.    Patient can be transferred out of ICU to telemetry floor.  Eventually will need rehab placement. Will sign out to hospitalist team.     Plan of care and goals reviewed with multidisciplinary/antibiotic stewardship team during rounds.   I discussed the patient's findings and my recommendations with nursing staff       Manuel Boone MD, [facility]  Pulmonary, Critical care and Sleep Medicine

## 2024-01-04 NOTE — THERAPY TREATMENT NOTE
Patient Name: Chano Rees  : 1970    MRN: [id number]                              Today's Date: 2024       Admit Date: 2023    Visit Dx:     ICD-10-CM ICD-9-CM   1. Intracranial hemorrhage  I62.9 432.9   2. Uncontrolled hypertension  I10 401.9   3. Personal history of noncompliance with medical treatment  Z91.199 V15.81   4. Cognitive communication deficit  R41.841 799.52   5. Oropharyngeal dysphagia  R13.12 787.22     Patient Active Problem List   Diagnosis    Rt Thalamic ICH    Hypertension    Non-compliance    History of alcohol abuse     Past Medical History:   Diagnosis Date    Hypertension      Past Surgical History:   Procedure Laterality Date    ANKLE SURGERY Left       General Information       Row Name 24 1420          OT Time and Intention    Document Type therapy note (daily note)  -CS     Mode of Treatment occupational therapy;co-treatment  -CS       Row Name 24 1420          General Information    Patient Profile Reviewed yes  -CS     Existing Precautions/Restrictions fall;other (see comments)  L sided weakness/inattention  -CS     Barriers to Rehab medically complex;previous functional deficit;visual deficit  -CS       Row Name 24 1420          Living Environment    People in Home significant other  -CS       Row Name 24 1420          Home Main Entrance    Number of Stairs, Main Entrance none  -CS       Row Name 24 1420          Cognition    Orientation Status (Cognition) oriented x 3  -CS       Row Name 24 1420          Safety Issues, Functional Mobility    Impairments Affecting Function (Mobility) balance;cognition;coordination;endurance/activity tolerance;motor control;motor planning;postural/trunk control;range of motion (ROM);strength;visual/perceptual  -CS     Cognitive Impairments, Mobility Safety/Performance attention;insight into deficits/self-awareness;sequencing abilities  -CS               User Key  (r) = Recorded By, (t) = Taken  By, (c) = Cosigned By      Initials Name Provider Type    CS Halley Ceron OT Occupational Therapist                     Mobility/ADL's       Row Name 01/04/24 1420          Bed Mobility    Bed Mobility supine-sit  -     Supine-Sit Goodwin (Bed Mobility) moderate assist (50% patient effort);2 person assist;verbal cues  -     Assistive Device (Bed Mobility) bed rails;draw sheet;head of bed elevated  -       Row Name 01/04/24 1420          Transfers    Transfers bed-chair transfer;sit-stand transfer;stand-sit transfer  -     Comment, (Transfers) BUE support w/ B knees blocked w/ noted B knee buckling L>R  -       Row Name 01/04/24 1420          Bed-Chair Transfer    Bed-Chair Goodwin (Transfers) maximum assist (25% patient effort);2 person assist;verbal cues  -       Row Name 01/04/24 1420          Sit-Stand Transfer    Sit-Stand [place]win (Transfers) maximum assist (25% patient effort);2 person assist;verbal cues  -       Row Name 01/04/24 1420          Stand-Sit Transfer    Stand-Sit [place]win (Transfers) maximum assist (25% patient effort);2 person assist;verbal cues  -       Row Name 01/04/24 1420          Activities of Daily Living    BADL Assessment/Intervention lower body dressing;grooming  -       Row Name 01/04/24 1420          Lower Body Dressing Assessment/Training    Goodwin Level (Lower Body Dressing) don;socks;dependent (less than 25% patient effort)  -CS     Position (Lower Body Dressing) supine  -       Row Name 01/04/24 1420          Grooming Assessment/Training    Goodwin Level (Grooming) wash face, hands;moderate assist (50% patient effort)  -CS     Position (Grooming) supported sitting  -               User Key  (r) = Recorded By, (t) = Taken By, (c) = Cosigned By      Initials Name Provider Type    Halley Carlos OT Occupational Therapist                   Obj/Interventions       Row Name 01/04/24 1424          Shoulder (Therapeutic Exercise)     Shoulder AAROM (Therapeutic Exercise) right assist left;flexion;10 repetitions  -       Row Name 01/04/24 1424          Wrist (Therapeutic Exercise)    Wrist AAROM (Therapeutic Exercise) right assist left;flexion;extension;10 repetitions  -       Row Name 01/04/24 1424          Motor Skills    Therapeutic Exercise shoulder;elbow/forearm  -       Row Name 01/04/24 1424          Balance    Balance Assessment sitting static balance;sitting dynamic balance;standing static balance;standing dynamic balance  -     Static Sitting Balance minimal assist  -     Dynamic Sitting Balance moderate assist  -CS     Position, Sitting Balance sitting edge of bed  -     Static Standing Balance maximum assist;2-person assist  -CS     Dynamic Standing Balance maximum assist;2-person assist  -CS     Position/Device Used, Standing Balance supported  -     Balance Interventions sitting;standing;static;dynamic;dynamic reaching;occupation based/functional task;weight shifting activity  -               User Key  (r) = Recorded By, (t) = Taken By, (c) = Cosigned By      Initials Name Provider Type    CS Halley Ceron, ATIF Occupational Therapist                   Goals/Plan    No documentation.                  Clinical Impression       Row Name 01/04/24 1424          Pain Assessment    Pretreatment Pain Rating 0/10 - no pain  -     Posttreatment Pain Rating 0/10 - no pain  -       Row Name 01/04/24 1424          Plan of Care Review    Plan of Care Reviewed With patient  -     Progress improving  -     Outcome Evaluation Pt demo improved alertness and independence by progressing bed mobility to Mod Ax2. Pt conts to be limited d/t L sided weakness/visual deficits; educated on compensatory strategies to improved independence. Recommend pt DC to IP rehab.  -       Row Name 01/04/24 1424          Therapy Plan Review/Discharge Plan (OT)    Anticipated Discharge Disposition (OT) inpatient rehabilitation facility  -        Row Name 01/04/24 1424          Vital Signs    Pre Systolic BP Rehab 139  -CS     Pre Treatment Diastolic BP 94  -CS     Post Systolic BP Rehab 132  -CS     Post Treatment Diastolic BP 89  -CS     Pretreatment Heart Rate (beats/min) 69  -CS     Posttreatment Heart Rate (beats/min) 70  -CS     Pre SpO2 (%) 97  -CS     O2 Delivery Pre Treatment room air  -CS     Post SpO2 (%) 97  -CS     O2 Delivery Post Treatment room air  -CS     Pre Patient Position Supine  -CS     Intra Patient Position Standing  -CS     Post Patient Position Sitting  -CS       Row Name 01/04/24 1424          Positioning and Restraints    Pre-Treatment Position in bed  -CS     Post Treatment Position chair  -CS     In Chair notified nsg;reclined;call light within reach;encouraged to call for assist;exit alarm on;RUE elevated;LUE elevated;waffle cushion;on mechanical lift sling;legs elevated;heels elevated  -CS               User Key  (r) = Recorded By, (t) = Taken By, (c) = Cosigned By      Initials Name Provider Type    CS Halley Ceron, OT Occupational Therapist                   Outcome Measures       Row Name 01/04/24 1426          How much help from another is currently needed...    Putting on and taking off regular lower body clothing? 1  -CS     Bathing (including washing, rinsing, and drying) 1  -CS     Toileting (which includes using toilet bed pan or urinal) 1  -CS     Putting on and taking off regular upper body clothing 2  -CS     Taking care of personal grooming (such as brushing teeth) 2  -CS     Eating meals 2  -CS     AM-PAC 6 Clicks Score (OT) 9  -CS       Row Name 01/04/24 0956 01/04/24 0800       How much help from another person do you currently need...    Turning from your back to your side while in flat bed without using bedrails? 2  -AY 3  -CM    Moving from lying on back to sitting on the side of a flat bed without bedrails? 2  -AY 2  -CM    Moving to and from a bed to a chair (including a wheelchair)? 2  -AY 2  -CM     Standing up from a chair using your arms (e.g., wheelchair, bedside chair)? 2  -AY 1  -CM    Climbing 3-5 steps with a railing? 1  -AY 1  -CM    To walk in hospital room? 1  -AY 1  -CM    AM-PAC 6 Clicks Score (PT) 10  -AY 10  -CM    Highest Level of Mobility Goal 4 --> Transfer to chair/commode  -AY 4 --> Transfer to chair/commode  -CM      Row Name 01/04/24 1426 01/04/24 0956       Functional Assessment    Outcome Measure Options AM-PAC 6 Clicks Daily Activity (OT)  -CS AM-PAC 6 Clicks Basic Mobility (PT)  -AY              User Key  (r) = Recorded By, (t) = Taken By, (c) = Cosigned By      Initials Name Provider Type    Halley Carlos, OT Occupational Therapist    Jeanie Paulino, RN Registered Nurse    AY Fabby Villarreal, PT Physical Therapist                    Occupational Therapy Education       Title: PT OT SLP Therapies (In Progress)       Topic: Occupational Therapy (In Progress)       Point: ADL training (In Progress)       Description:   Instruct learner(s) on proper safety adaptation and remediation techniques during self care or transfers.   Instruct in proper use of assistive devices.                  Learning Progress Summary             Patient Acceptance, E, NR by CS at 1/4/2024 1427    Acceptance, E, NR by CS at 1/1/2024 1409    Acceptance, E, VU,NR by AN at 12/28/2023 1531   Family Acceptance, E, VU,NR by AN at 12/28/2023 1531                         Point: Home exercise program (In Progress)       Description:   Instruct learner(s) on appropriate technique for monitoring, assisting and/or progressing therapeutic exercises/activities.                  Learning Progress Summary             Patient Acceptance, E, NR by CS at 1/4/2024 1427    Acceptance, E, NR by CS at 1/1/2024 1409                         Point: Precautions (In Progress)       Description:   Instruct learner(s) on prescribed precautions during self-care and functional transfers.                  Learning Progress Summary              Patient Acceptance, E, NR by CS at 1/4/2024 1427    Acceptance, E, NR by CS at 1/1/2024 1409    Acceptance, E, VU,NR by AN at 12/28/2023 1531   Family Acceptance, E, VU,NR by AN at 12/28/2023 1531                         Point: Body mechanics (In Progress)       Description:   Instruct learner(s) on proper positioning and spine alignment during self-care, functional mobility activities and/or exercises.                  Learning Progress Summary             Patient Acceptance, E, NR by CS at 1/4/2024 1427    Acceptance, E, NR by CS at 1/1/2024 1409    Acceptance, E, VU,NR by AN at 12/28/2023 1531   Family Acceptance, E, VU,NR by AN at 12/28/2023 1531                                         User Key       Initials Effective Dates Name Provider Type Discipline     09/02/21 -  Halley Ceron OT Occupational Therapist OT    BREEZY 09/21/21 -  Princess Gtz OT Occupational Therapist OT                  OT Recommendation and Plan     Plan of Care Review  Plan of Care Reviewed With: patient  Progress: improving  Outcome Evaluation: Pt demo improved alertness and independence by progressing bed mobility to Mod Ax2. Pt conts to be limited d/t L sided weakness/visual deficits; educated on compensatory strategies to improved independence. Recommend pt DC to IP rehab.     Time Calculation:         Time Calculation- OT       Row Name 01/04/24 1427             Time Calculation- OT    OT Start Time 0835  -CS      OT Received On 01/04/24  -CS      OT Goal Re-Cert Due Date 01/07/24  -CS         Timed Charges    62131 - OT Therapeutic Exercise Minutes 2  -CS      31116 - OT Therapeutic Activity Minutes 5  -CS      22674 - OT Self Care/Mgmt Minutes 3  -CS         Total Minutes    Timed Charges Total Minutes 10  -CS       Total Minutes 10  -CS                User Key  (r) = Recorded By, (t) = Taken By, (c) = Cosigned By      Initials Name Provider Type    CS Halley Ceron OT Occupational Therapist                   Therapy Charges for Today       Code Description Service Date Service Provider Modifiers Qty    71425120560  OT THERAPEUTIC ACT EA 15 MIN 1/4/2024 Halley Ceron, OT GO 1                 Halley Ceron OT  1/4/2024

## 2024-01-04 NOTE — PLAN OF CARE
Goal Outcome Evaluation:  Plan of Care Reviewed With: patient, family        Progress: improving  Outcome Evaluation: Pt showing improvement as he progressed with mobility to pivot to chair with max Ax2; limited by L weakness and balance deficit compared to baseline. Pt/family educated about HEP performance throughout day. Cont to progress as able. d/c rec for IRF.      Anticipated Discharge Disposition (PT): inpatient rehabilitation facility

## 2024-01-04 NOTE — THERAPY TREATMENT NOTE
Patient Name: Chano Rees  : 1970    MRN: [id number]                              Today's Date: 2024       Admit Date: 2023    Visit Dx:     ICD-10-CM ICD-9-CM   1. Intracranial hemorrhage  I62.9 432.9   2. Uncontrolled hypertension  I10 401.9   3. Personal history of noncompliance with medical treatment  Z91.199 V15.81   4. Cognitive communication deficit  R41.841 799.52   5. Oropharyngeal dysphagia  R13.12 787.22     Patient Active Problem List   Diagnosis    Rt Thalamic ICH    Hypertension    Non-compliance    History of alcohol abuse     Past Medical History:   Diagnosis Date    Hypertension      Past Surgical History:   Procedure Laterality Date    ANKLE SURGERY Left       General Information       Row Name 2449          Physical Therapy Time and Intention    Document Type therapy note (daily note)  -AY     Mode of Treatment physical therapy;co-treatment  -AY       Row Name 24          General Information    Patient Profile Reviewed yes  -AY     Existing Precautions/Restrictions fall;other (see comments)  L weakness/neglect  -AY     Barriers to Rehab medically complex;cognitive status;visual deficit  -AY       Row Name 24          Cognition    Orientation Status (Cognition) oriented x 3  -AY       Row Name 24          Safety Issues, Functional Mobility    Safety Issues Affecting Function (Mobility) insight into deficits/self-awareness;sequencing abilities;awareness of need for assistance;safety precaution awareness  -AY     Impairments Affecting Function (Mobility) balance;cognition;coordination;endurance/activity tolerance;motor control;motor planning;postural/trunk control;range of motion (ROM);strength;visual/perceptual  -AY     Cognitive Impairments, Mobility Safety/Performance attention  -AY               User Key  (r) = Recorded By, (t) = Taken By, (c) = Cosigned By      Initials Name Provider Type    AY Fabby Villarreal, PT Physical Therapist                    Mobility       Row Name 01/04/24 0950          Bed Mobility    Bed Mobility supine-sit  -AY     Supine-Sit [place] (Bed Mobility) moderate assist (50% patient effort);2 person assist;verbal cues  -AY     Assistive Device (Bed Mobility) draw sheet;bed rails;head of bed elevated  -AY     Comment, (Bed Mobility) cueing for sequencing and hand placement.  -AY       Row Name 01/04/24 0950          Bed-Chair Transfer    Bed-Chair [place] (Transfers) maximum assist (25% patient effort);2 person assist;verbal cues  -AY     Assistive Device (Bed-Chair Transfers) other (see comments)  BUE support  -AY     Comment, (Bed-Chair Transfer) significant L knee buckling  -AY       Row Name 01/04/24 0950          Sit-Stand Transfer    Sit-Stand [place] (Transfers) maximum assist (25% patient effort);2 person assist;verbal cues;nonverbal cues (demo/gesture)  -AY     Assistive Device (Sit-Stand Transfers) other (see comments)  BUE Support  -AY       Row Name 01/04/24 0950          Gait/Stairs (Locomotion)    [place] Level (Gait) not tested  -AY               User Key  (r) = Recorded By, (t) = Taken By, (c) = Cosigned By      Initials Name Provider Type    AY Fabby Villarreal PT Physical Therapist                   Obj/Interventions       Row Name 01/04/24 0953          Motor Skills    Therapeutic Exercise hip;knee;ankle  -AY       Row Name 01/04/24 0953          Hip (Therapeutic Exercise)    Hip (Therapeutic Exercise) AAROM (active assistive range of motion)  -AY     Hip AAROM (Therapeutic Exercise) left;flexion;aBduction;10 repetitions;sitting  -AY       Row Name 01/04/24 0953          Knee (Therapeutic Exercise)    Knee (Therapeutic Exercise) AAROM (active assistive range of motion)  -AY     Knee AAROM (Therapeutic Exercise) left;flexion;extension;sitting;10 repetitions  -AY       Row Name 01/04/24 0953          Ankle (Therapeutic Exercise)    Ankle AROM (Therapeutic Exercise)  left;dorsiflexion;plantarflexion;10 repetitions;sitting  -AY       Row Name 01/04/24 0953          Balance    Balance Assessment sitting static balance;sitting dynamic balance;sit to stand dynamic balance;standing static balance;standing dynamic balance  -AY     Static Sitting Balance minimal assist  -AY     Dynamic Sitting Balance moderate assist  -AY     Position, Sitting Balance unsupported;sitting in chair;sitting edge of bed  -AY     Sit to Stand Dynamic Balance maximum assist  -AY     Static Standing Balance maximum assist  -AY     Dynamic Standing Balance maximum assist  -AY     Position/Device Used, Standing Balance supported  -AY     Comment, Balance L lateral lean; improved with cueing/awareness  -AY               User Key  (r) = Recorded By, (t) = Taken By, (c) = Cosigned By      Initials Name Provider Type    AY Fabby Villarreal, PT Physical Therapist                   Goals/Plan    No documentation.                  Clinical Impression       Row Name 01/04/24 0954          Pain    Pretreatment Pain Rating 0/10 - no pain  -AY     Posttreatment Pain Rating 0/10 - no pain  -AY     Pain Intervention(s) Ambulation/increased activity;Repositioned  -AY     Additional Documentation Pain Scale: Numbers Pre/Post-Treatment (Group)  -AY       Row Name 01/04/24 0954          Plan of Care Review    Plan of Care Reviewed With patient;family  -AY     Progress improving  -AY     Outcome Evaluation Pt showing improvement as he progressed with mobility to pivot to chair with max Ax2; limited by L weakness and balance deficit compared to baseline. Pt/family educated about HEP performance throughout day. Cont to progress as able. d/c rec for IRF.  -AY       Row Name 01/04/24 0954          Vital Signs    Pre Systolic BP Rehab 139  -AY     Pre Treatment Diastolic BP 94  -AY     Post Systolic BP Rehab 132  -AY     Post Treatment Diastolic BP 89  -AY     Pretreatment Heart Rate (beats/min) 70  -AY     Posttreatment Heart Rate  (beats/min) 66  -AY     Pre SpO2 (%) 96  -AY     O2 Delivery Pre Treatment room air  -AY     O2 Delivery Intra Treatment room air  -AY     Post SpO2 (%) 95  -AY     O2 Delivery Post Treatment room air  -AY     Pre Patient Position Supine  -AY     Intra Patient Position Standing  -AY     Post Patient Position Sitting  -AY       Row Name 01/04/24 0954          Positioning and Restraints    Pre-Treatment Position in bed  -AY     Post Treatment Position chair  -AY     In Chair notified nsg;reclined;sitting;call light within reach;encouraged to call for assist;exit alarm on;legs elevated;LUE elevated;RUE elevated;with family/caregiver;on mechanical lift sling;waffle cushion  -AY               User Key  (r) = Recorded By, (t) = Taken By, (c) = Cosigned By      Initials Name Provider Type    Fabby Quiroga PT Physical Therapist                   Outcome Measures       Row Name 01/04/24 0956          How much help from another person do you currently need...    Turning from your back to your side while in flat bed without using bedrails? 2  -AY     Moving from lying on back to sitting on the side of a flat bed without bedrails? 2  -AY     Moving to and from a bed to a chair (including a wheelchair)? 2  -AY     Standing up from a chair using your arms (e.g., wheelchair, bedside chair)? 2  -AY     Climbing 3-5 steps with a railing? 1  -AY     To walk in hospital room? 1  -AY     AM-PAC 6 Clicks Score (PT) 10  -AY     Highest Level of Mobility Goal 4 --> Transfer to chair/commode  -AY       Row Name 01/04/24 0956          Functional Assessment    Outcome Measure Options AM-PAC 6 Clicks Basic Mobility (PT)  -AY               User Key  (r) = Recorded By, (t) = Taken By, (c) = Cosigned By      Initials Name Provider Type    Fabby Quiroga PT Physical Therapist                                 Physical Therapy Education       Title: PT OT SLP Therapies (In Progress)       Topic: Physical Therapy (In Progress)       Point:  Mobility training (In Progress)       Learning Progress Summary             Patient Acceptance, E,TB, NR by AY at 1/4/2024 0956    Acceptance, E,TB, NR by AY at 1/3/2024 1420    Acceptance, E,TB, NR by AY at 1/1/2024 1402    Acceptance, E, NR,VU by ZS at 12/29/2023 0752    Acceptance, E, NR by KG at 12/28/2023 0845   Family Acceptance, E,TB, NR by AY at 1/4/2024 0956    Acceptance, E, NR,VU by ZS at 12/29/2023 0752                         Point: Home exercise program (In Progress)       Learning Progress Summary             Patient Acceptance, E,TB, NR by AY at 1/4/2024 0956    Acceptance, E,TB, NR by AY at 1/3/2024 1420    Acceptance, E,TB, NR by AY at 1/1/2024 1402   Family Acceptance, E,TB, NR by AY at 1/4/2024 0956                         Point: Body mechanics (In Progress)       Learning Progress Summary             Patient Acceptance, E,TB, NR by AY at 1/4/2024 0956    Acceptance, E,TB, NR by AY at 1/3/2024 1420    Acceptance, E,TB, NR by AY at 1/1/2024 1402    Acceptance, E, NR by KG at 12/28/2023 0845   Family Acceptance, E,TB, NR by AY at 1/4/2024 0956                         Point: Precautions (In Progress)       Learning Progress Summary             Patient Acceptance, E,TB, NR by AY at 1/4/2024 0956    Acceptance, E,TB, NR by AY at 1/3/2024 1420    Acceptance, E,TB, NR by AY at 1/1/2024 1402    Acceptance, E, NR by KG at 12/28/2023 0845   Family Acceptance, E,TB, NR by AY at 1/4/2024 0956                                         User Key       Initials Effective Dates Name Provider Type Discipline    KG 05/22/20 -  Katina Anderson, PT Physical Therapist PT    ZS 02/13/23 -  Jerald Spencer, RN Registered Nurse Nurse    ISABEL 11/10/20 -  Fabby Villarreal, PT Physical Therapist PT                  PT Recommendation and Plan     Plan of Care Reviewed With: patient, family  Progress: improving  Outcome Evaluation: Pt showing improvement as he progressed with mobility to pivot to chair with max Ax2; limited by  L weakness and balance deficit compared to baseline. Pt/family educated about HEP performance throughout day. Cont to progress as able. d/c rec for IRF.     Time Calculation:         PT Charges       Row Name 01/04/24 0957             Time Calculation    Start Time 0825  -AY      PT Received On 01/04/24  -AY         Timed Charges    53808 - PT Therapeutic Exercise Minutes 5  -AY      80330 - PT Therapeutic Activity Minutes 5  -AY         Total Minutes    Timed Charges Total Minutes 10  -AY       Total Minutes 10  -AY                User Key  (r) = Recorded By, (t) = Taken By, (c) = Cosigned By      Initials Name Provider Type    AY Fabby Villarreal, EUGENE Physical Therapist                  Therapy Charges for Today       Code Description Service Date Service Provider Modifiers Qty    22365287848 HC PT THER PROC EA 15 MIN 1/3/2024 Fabby Villarreal, PT GP 2    54420034254 HC PT THERAPEUTIC ACT EA 15 MIN 1/3/2024 Fabby Villarreal, PT GP 2    07743908379 HC PT THER SUPP EA 15 MIN 1/3/2024 Fabby Villarreal, PT GP 3    14841738230 HC PT THER PROC EA 15 MIN 1/4/2024 Fabby Villarreal, PT GP 1            PT G-Codes  Outcome Measure Options: AM-PAC 6 Clicks Basic Mobility (PT)  AM-PAC 6 Clicks Score (PT): 10  AM-PAC 6 Clicks Score (OT): 6  Modified Leon Scale: 4 - Moderately severe disability.  Unable to walk without assistance, and unable to attend to own bodily needs without assistance.  PT Discharge Summary  Anticipated Discharge Disposition (PT): inpatient rehabilitation facility    Fabby Villarreal PT  1/4/2024

## 2024-01-04 NOTE — PLAN OF CARE
Anesthesia Evaluation     Patient summary reviewed and Nursing notes reviewed   NPO Solid Status: > 8 hours  NPO Liquid Status: > 8 hours           Airway   Mallampati: II  TM distance: >3 FB  Neck ROM: limited  Possible difficult intubation  Dental - normal exam   (+) poor dentition    Pulmonary - normal exam   (+) shortness of breath, sleep apnea,   (-) not a smoker  Cardiovascular - normal exam  Exercise tolerance: good (4-7 METS)    ECG reviewed  PT is on anticoagulation therapy  Patient on routine beta blocker and Beta blocker given within 24 hours of surgery    (+) hypertension 2 medications or greater, dysrhythmias Atrial Fib, hyperlipidemia,     ROS comment: ELIQUIS    ECG 12 Lead     Date/Time: 4/7/2021 12:31 PM   Performed by: Mily Rutledge APRN   Authorized by: Mily Rutledge APRN   Comparison: compared with previous ECG from 3/30/2020   Rhythm: sinus rhythm   Rate: normal   Conduction: 1st degree AV block   QRS axis: normal   Other findings: non-specific ST-T wave changes     Clinical impression: abnormal EKG     2/2020 ECHO  · Estimated EF = 55%.  · Left ventricular systolic function is normal.       Neuro/Psych  (+) headaches,     GI/Hepatic/Renal/Endo    (+) morbid obesity,  renal disease ARF, diabetes mellitus,     Musculoskeletal     (+) back pain, chronic pain,   Abdominal   (+) obese,    Substance History   (+) alcohol use,   (-) drug use     OB/GYN negative ob/gyn ROS   (-)  Pregnant    Comment: hysterectomy      Other   arthritis,      ROS/Med Hx Other: Sepsis due to urinary tract infection (CMS/HCC)     1. Sepsis, due to unknown infectious organism, POA  2. Complicated UTI, due to # 3, POA  3. Left ureteral stone with mild hydronephrosis, POA  4. Acute kidney injury, likely related to # 1, POA  5. Leukopenia/thrombocytopenia, likely related to # 1  6. Acute hypoxic respiratory failure, likely multifactorial in the setting of sepsis and obesity hypoventilation syndrome  7. Type 2  Goal Outcome Evaluation:  Plan of Care Reviewed With: patient        Progress: improving  Outcome Evaluation: Pt demo improved alertness and independence by progressing bed mobility to Mod Ax2. Pt conts to be limited d/t L sided weakness/visual deficits; educated on compensatory strategies to improved independence. Recommend pt DC to IP rehab.      Anticipated Discharge Disposition (OT): inpatient rehabilitation facility   DM, insulin dependent  8. Obesity  9. Chronic debility  10. Chronic back pain    Impaired gait - functional mobility                    Anesthesia Plan    ASA 3 - emergent     general   (Patient advised that intravenous sedation would be utilized as primary anesthetic technique. Every effort will be made to make sure patient is comfortable. Patient advised that they may experience recall of events of the procedure. Patient verbalized understanding and agreed to plan. )  intravenous induction     Anesthetic plan, all risks, benefits, and alternatives have been provided, discussed and informed consent has been obtained with: patient.    Plan discussed with CRNA.

## 2024-01-05 LAB
ANION GAP SERPL CALCULATED.3IONS-SCNC: 12 MMOL/L (ref 5–15)
BASOPHILS # BLD AUTO: 0.09 10*3/MM3 (ref 0–0.2)
BASOPHILS NFR BLD AUTO: 0.7 % (ref 0–1.5)
BUN SERPL-MCNC: 24 MG/DL (ref 6–20)
BUN/CREAT SERPL: 19.8 (ref 7–25)
CALCIUM SPEC-SCNC: 9.3 MG/DL (ref 8.6–10.5)
CHLORIDE SERPL-SCNC: 102 MMOL/L (ref 98–107)
CO2 SERPL-SCNC: 22 MMOL/L (ref 22–29)
CREAT SERPL-MCNC: 1.21 MG/DL (ref 0.76–1.27)
DEPRECATED RDW RBC AUTO: 46.7 FL (ref 37–54)
EGFRCR SERPLBLD CKD-EPI 2021: 71.6 ML/MIN/1.73
EOSINOPHIL # BLD AUTO: 0.27 10*3/MM3 (ref 0–0.4)
EOSINOPHIL NFR BLD AUTO: 2.1 % (ref 0.3–6.2)
ERYTHROCYTE [DISTWIDTH] IN BLOOD BY AUTOMATED COUNT: 14.1 % (ref 12.3–15.4)
GLUCOSE SERPL-MCNC: 111 MG/DL (ref 65–99)
HCT VFR BLD AUTO: 47.5 % (ref 37.5–51)
HGB BLD-MCNC: 15.3 G/DL (ref 13–17.7)
IMM GRANULOCYTES # BLD AUTO: 0.25 10*3/MM3 (ref 0–0.05)
IMM GRANULOCYTES NFR BLD AUTO: 1.9 % (ref 0–0.5)
LYMPHOCYTES # BLD AUTO: 1.62 10*3/MM3 (ref 0.7–3.1)
LYMPHOCYTES NFR BLD AUTO: 12.4 % (ref 19.6–45.3)
MAGNESIUM SERPL-MCNC: 2.5 MG/DL (ref 1.6–2.6)
MCH RBC QN AUTO: 29.3 PG (ref 26.6–33)
MCHC RBC AUTO-ENTMCNC: 32.2 G/DL (ref 31.5–35.7)
MCV RBC AUTO: 90.8 FL (ref 79–97)
MONOCYTES # BLD AUTO: 1.33 10*3/MM3 (ref 0.1–0.9)
MONOCYTES NFR BLD AUTO: 10.2 % (ref 5–12)
NEUTROPHILS NFR BLD AUTO: 72.7 % (ref 42.7–76)
NEUTROPHILS NFR BLD AUTO: 9.53 10*3/MM3 (ref 1.7–7)
NRBC BLD AUTO-RTO: 0 /100 WBC (ref 0–0.2)
PLATELET # BLD AUTO: 291 10*3/MM3 (ref 140–450)
PMV BLD AUTO: 10.7 FL (ref 6–12)
POTASSIUM SERPL-SCNC: 4.4 MMOL/L (ref 3.5–5.2)
RBC # BLD AUTO: 5.23 10*6/MM3 (ref 4.14–5.8)
SODIUM SERPL-SCNC: 136 MMOL/L (ref 136–145)
WBC NRBC COR # BLD AUTO: 13.09 10*3/MM3 (ref 3.4–10.8)

## 2024-01-05 PROCEDURE — 85025 COMPLETE CBC W/AUTO DIFF WBC: CPT | Performed by: INTERNAL MEDICINE

## 2024-01-05 PROCEDURE — 25010000002 PIPERACILLIN SOD-TAZOBACTAM PER 1 G: Performed by: INTERNAL MEDICINE

## 2024-01-05 PROCEDURE — 80048 BASIC METABOLIC PNL TOTAL CA: CPT | Performed by: INTERNAL MEDICINE

## 2024-01-05 PROCEDURE — 92526 ORAL FUNCTION THERAPY: CPT

## 2024-01-05 PROCEDURE — 92507 TX SP LANG VOICE COMM INDIV: CPT

## 2024-01-05 PROCEDURE — 83735 ASSAY OF MAGNESIUM: CPT | Performed by: INTERNAL MEDICINE

## 2024-01-05 PROCEDURE — 93010 ELECTROCARDIOGRAM REPORT: CPT | Performed by: STUDENT IN AN ORGANIZED HEALTH CARE EDUCATION/TRAINING PROGRAM

## 2024-01-05 PROCEDURE — 25010000002 NICARDIPINE 2.5 MG/ML SOLUTION 10 ML VIAL: Performed by: INTERNAL MEDICINE

## 2024-01-05 PROCEDURE — 93005 ELECTROCARDIOGRAM TRACING: CPT | Performed by: INTERNAL MEDICINE

## 2024-01-05 PROCEDURE — 99233 SBSQ HOSP IP/OBS HIGH 50: CPT | Performed by: INTERNAL MEDICINE

## 2024-01-05 PROCEDURE — 25810000003 SODIUM CHLORIDE 0.9 % SOLUTION 250 ML FLEX CONT: Performed by: INTERNAL MEDICINE

## 2024-01-05 RX ORDER — HYDRALAZINE HYDROCHLORIDE 20 MG/ML
10 INJECTION INTRAMUSCULAR; INTRAVENOUS EVERY 6 HOURS PRN
Status: DISCONTINUED | OUTPATIENT
Start: 2024-01-05 | End: 2024-01-08 | Stop reason: HOSPADM

## 2024-01-05 RX ORDER — LISINOPRIL 20 MG/1
20 TABLET ORAL EVERY 12 HOURS SCHEDULED
Status: DISCONTINUED | OUTPATIENT
Start: 2024-01-05 | End: 2024-01-08 | Stop reason: HOSPADM

## 2024-01-05 RX ORDER — DIPHENHYDRAMINE HCL 25 MG
25 CAPSULE ORAL NIGHTLY PRN
Status: DISCONTINUED | OUTPATIENT
Start: 2024-01-05 | End: 2024-01-05

## 2024-01-05 RX ORDER — HYDROXYZINE HYDROCHLORIDE 25 MG/1
50 TABLET, FILM COATED ORAL NIGHTLY PRN
Status: DISCONTINUED | OUTPATIENT
Start: 2024-01-05 | End: 2024-01-08 | Stop reason: HOSPADM

## 2024-01-05 RX ORDER — CHOLECALCIFEROL (VITAMIN D3) 125 MCG
10 CAPSULE ORAL NIGHTLY PRN
Status: DISCONTINUED | OUTPATIENT
Start: 2024-01-05 | End: 2024-01-08 | Stop reason: HOSPADM

## 2024-01-05 RX ADMIN — PIPERACILLIN SODIUM AND TAZOBACTAM SODIUM 4.5 G: 4; .5 INJECTION, SOLUTION INTRAVENOUS at 08:34

## 2024-01-05 RX ADMIN — ACETAMINOPHEN 650 MG: 650 SOLUTION ORAL at 13:24

## 2024-01-05 RX ADMIN — PIPERACILLIN SODIUM AND TAZOBACTAM SODIUM 4.5 G: 4; .5 INJECTION, SOLUTION INTRAVENOUS at 13:20

## 2024-01-05 RX ADMIN — Medication 10 ML: at 20:16

## 2024-01-05 RX ADMIN — LISINOPRIL 20 MG: 20 TABLET ORAL at 17:22

## 2024-01-05 RX ADMIN — LISINOPRIL 20 MG: 20 TABLET ORAL at 08:22

## 2024-01-05 RX ADMIN — Medication 1 PATCH: at 08:23

## 2024-01-05 RX ADMIN — Medication 10 ML: at 08:24

## 2024-01-05 RX ADMIN — METOPROLOL TARTRATE 50 MG: 50 TABLET ORAL at 08:22

## 2024-01-05 RX ADMIN — HYDROXYZINE HYDROCHLORIDE 50 MG: 25 TABLET, FILM COATED ORAL at 20:16

## 2024-01-05 RX ADMIN — AMLODIPINE BESYLATE 10 MG: 10 TABLET ORAL at 08:22

## 2024-01-05 RX ADMIN — NICARDIPINE HYDROCHLORIDE 10 MG/HR: 25 INJECTION, SOLUTION INTRAVENOUS at 02:33

## 2024-01-05 RX ADMIN — NICARDIPINE HYDROCHLORIDE 10 MG/HR: 25 INJECTION, SOLUTION INTRAVENOUS at 08:19

## 2024-01-05 RX ADMIN — Medication 10 MG: at 22:37

## 2024-01-05 RX ADMIN — THIAMINE HCL TAB 100 MG 100 MG: 100 TAB at 08:22

## 2024-01-05 RX ADMIN — NICARDIPINE HYDROCHLORIDE 7.5 MG/HR: 25 INJECTION, SOLUTION INTRAVENOUS at 17:22

## 2024-01-05 RX ADMIN — Medication 1 TABLET: at 08:21

## 2024-01-05 RX ADMIN — METOPROLOL TARTRATE 50 MG: 50 TABLET ORAL at 20:16

## 2024-01-05 NOTE — CASE MANAGEMENT/SOCIAL WORK
Continued Stay Note  [facility]     Patient Name: Chano Rees  MRN: [id number]  Today's Date: 1/5/2024    Admit Date: 12/27/2023    Plan: IRF   Discharge Plan       Row Name 01/05/24 0715       Plan    Plan IRF    Plan Comments Notified by Alex at [facility] that they have declined to accept patient.  Spoke with patient's significant other, Amrita, about additional referrals and they would like referrals sent to [facility] in [facility].  Referral called to April with [facility] and faxed to [facility] in [place].  CM will continue to follow and assist with discharge plan.                   Discharge Codes    No documentation.                   Dasha Adhikari RN

## 2024-01-05 NOTE — PLAN OF CARE
Goal Outcome Evaluation:  Plan of Care Reviewed With: patient, father, mother, significant other        Progress: improving       SLP treatment completed. Will continue to address dysphagia and communication. Please see note for further details and recommendations.    Anticipated Discharge Disposition (SLP): inpatient rehabilitation facility

## 2024-01-05 NOTE — PROGRESS NOTES
[facility] Medicine Services  PROGRESS NOTE    Patient Name: Chano Rees  : 1970  MRN: [id number]    Date of Admission: 2023  Primary Care Physician: Provider, No Known    Subjective   Subjective     CC:  ICH    HPI:  ICU transfer today.  Still confused per dad at bedside.  Father at bedside states that patient is a jokester and was joking about drugs/etoh on admission and states that he does not do drugs or drink etoh.  States that patient became more confused with precedex?      Objective   Objective     Vital Signs:   Temp:  [97.9 °F (36.6 °C)-98.5 °F (36.9 °C)] 98 °F (36.7 °C)  Heart Rate:  [69-91] 80  Resp:  [18-20] 18  BP: (130-165)/() 138/76     Physical Exam:  Constitutional: No acute distress, awake, alert, sitting up in bed, father at bedside  HENT: NCAT, mucous membranes moist  Respiratory: Clear to auscultation bilaterally, respiratory effort normal   Cardiovascular: RRR, no murmurs, rubs, or gallops  Gastrointestinal: Positive bowel sounds, soft, nontender, nondistended  Musculoskeletal: No bilateral ankle edema  Psychiatric: Appropriate affect, cooperative  Neurologic: Oriented x 3 but mildly confused/naked in bed and not covered completely, LUE weakness, speech clear  Skin: No rashes      Results Reviewed:  LAB RESULTS:      Lab 24  0334 24  0425 24  0335 24  0530 24  0459 23  0405 23  0354 23  0108 23  2200   WBC 13.09* 13.24* 11.07* 11.33* 12.08*   < > 14.97*  --   --    HEMOGLOBIN 15.3 15.5 14.4 14.7 14.5   < > 14.9  --   --    HEMATOCRIT 47.5 47.5 45.0 43.7 44.1   < > 44.6  --   --    PLATELETS 291 271 227 201 232   < > 256  --   --    NEUTROS ABS 9.53* 9.05* 7.55* 7.11* 8.41*   < > 10.99*  --   --    IMMATURE GRANS (ABS) 0.25* 0.25* 0.20* 0.39* 0.24*   < > 0.15*  --   --    LYMPHS ABS 1.62 1.95 1.34 1.56 1.28   < > 1.48  --   --    MONOS ABS 1.33* 1.47* 1.41* 1.80* 1.81*   < > 2.27*  --   --     EOS ABS 0.27 0.43* 0.48* 0.40 0.27   < > 0.02  --   --    MCV 90.8 92.1 92.6 91.6 91.1   < > 91.4  --   --    CRP  --   --   --   --  8.97*  --   --   --   --    PROCALCITONIN  --   --  0.21  --   --   --   --   --   --    LACTATE  --   --   --   --   --   --  1.6 2.2* 3.9*    < > = values in this interval not displayed.         Lab 01/05/24 0334 01/04/24 0425 01/03/24 0335 01/02/24  0530 01/01/24 1756 01/01/24 0459 12/31/23 0405 12/30/23 0457   SODIUM 136 138 140 141  --  139   < > 139   POTASSIUM 4.4 4.5 4.5 4.6 4.1 3.4*   < > 4.2   CHLORIDE 102 104 107 107  --  107   < > 106   CO2 22.0 23.0 22.0 19.0*  --  20.0*   < > 21.0*   ANION GAP 12.0 11.0 11.0 15.0  --  12.0   < > 12.0   BUN 24* 26* 30* 30*  --  28*   < > 25*   CREATININE 1.21 1.17 0.97 1.15  --  1.18   < > 1.40*   EGFR 71.6 74.5 93.3 76.1  --  73.8   < > 60.1   GLUCOSE 111* 107* 108* 103*  --  97   < > 109*   CALCIUM 9.3 9.4 9.0 9.1  --  8.6   < > 8.8   MAGNESIUM 2.5  --  2.3  --   --  2.6  --  1.9   PHOSPHORUS  --   --  3.4  --   --  2.7  --  4.7*    < > = values in this interval not displayed.         Lab 01/03/24 0335 01/01/24 0459 12/30/23  0457   TOTAL PROTEIN 6.7 6.4  --    ALBUMIN 3.3* 3.2* 3.6   GLOBULIN 3.4 3.2  --    ALT (SGPT) 32 23  --    AST (SGOT) 24 26  --    BILIRUBIN 0.4 0.8  --    ALK PHOS 61 66  --              Lab 01/01/24  0459   CHOLESTEROL 156   TRIGLYCERIDES 110             Lab 01/01/24  0530 12/29/23 2042   PH, ARTERIAL 7.425 7.545*   PCO2, ARTERIAL 32.7* 25.3*   PO2 ART 80.0* 59.3*   FIO2 50 28   HCO3 ART 21.5 21.9   BASE EXCESS ART -2.1* 1.2   CARBOXYHEMOGLOBIN 1.3 1.5     Brief Urine Lab Results  (Last result in the past 365 days)        Color   Clarity   Blood   Leuk Est   Nitrite   Protein   CREAT   Urine HCG        12/29/23 2057 Yellow   Clear   Negative   Trace   Negative   Negative                   Microbiology Results Abnormal       Procedure Component Value - Date/Time    Blood Culture - Blood, Arm, Left  [[id number]]  (Normal) Collected: 12/29/23 2100    Lab Status: Final result Specimen: Blood from Arm, Left Updated: 01/03/24 2146     Blood Culture No growth at 5 days    Blood Culture - Blood, Hand, Left [[id number]]  (Normal) Collected: 12/29/23 2106    Lab Status: Final result Specimen: Blood from Hand, Left Updated: 01/03/24 2146     Blood Culture No growth at 5 days            No radiology results from the last 24 hrs    Results for orders placed during the hospital encounter of 12/27/23    Adult Transthoracic Echo Complete W/ Cont if Necessary Per Protocol (With Agitated Saline)    Interpretation Summary    Left ventricular systolic function is normal. Calculated left ventricular EF = 55.6%    Left ventricular diastolic function is consistent with (grade I) impaired relaxation.    Saline test results are negative.      Current medications:  Scheduled Meds:amLODIPine, 10 mg, Oral, Q24H  lisinopril, 20 mg, Oral, Q24H  metoprolol tartrate, 50 mg, Oral, Q12H  multivitamin pediatric chewable, 1 tablet, Oral, Daily  nicotine, 1 patch, Transdermal, Q24H  piperacillin-tazobactam, 4.5 g, Intravenous, Q8H  ProSource No Carb, 30 mL, Oral, BID  sodium chloride, 10 mL, Intravenous, Q12H  thiamine, 100 mg, Oral, Daily      Continuous Infusions:niCARdipine, 5-15 mg/hr, Last Rate: 10 mg/hr (01/05/24 0819)      PRN Meds:.  acetaminophen **OR** acetaminophen    Calcium Replacement - Follow Nurse / BPA Driven Protocol    dextrose    diphenhydrAMINE    glucagon (human recombinant)    hydrOXYzine    labetalol    Magnesium Standard Dose Replacement - Follow Nurse / BPA Driven Protocol    Phosphorus Replacement - Follow Nurse / BPA Driven Protocol    Potassium Replacement - Follow Nurse / BPA Driven Protocol    sodium chloride    sodium chloride    Assessment & Plan   Assessment & Plan     Active Hospital Problems    Diagnosis  POA    **Rt Thalamic ICH [I61.9]  Yes    Hypertension [I10]  Yes    Non-compliance [Z91.199]  Not Applicable     History of alcohol abuse [F10.11]  Yes      Resolved Hospital Problems   No resolved problems to display.        Brief Hospital Course to date:  Chano Rees is a 53 y.o. male with h/o HTN and poor medical compliance presented with sudden onset left sided weakness and headache with elevated BP.  CT showed 3cm right thalamic ICH felt to be d/t hypertensive bleed as CTA was unremarkable and was monitored initially in the ICU.  He had issues with encephalopathy and was treated with precedex infusion    Right Thalamic ICH  --CT showed 3cm right thalamic ICH  --CTA unremarkable  --felt to be d/t hypertensive bleed    HTN  --norvasc increased today.  Continue lisinopril, metoprolol  --still requiring cardene gtt    H/o etoh abuse on chart BUT now patient and father both adamantly denying etoh use.  --s/p precedex gtt while in ICU  --continue thiamine, folate, MTV    Possible Aspiration Pna  --s/p zosyn    Insomnia  --patient with prolonged QT.  Start hydroxyzine PRN.  Stop the benadryl that was ordered    Tobacco Abuse  --advise cessation, continue nicotine patch    D/w patient's father at bedside on 1/5/24    Expected Discharge Location and Transportation: awaiting rehab  Expected Discharge   Expected Discharge Date: 1/8/2024; Expected Discharge Time:      DVT prophylaxis:  Mechanical DVT prophylaxis orders are present.     AM-PAC 6 Clicks Score (PT): 10 (01/04/24 2000)    CODE STATUS:   Code Status and Medical Interventions:   Ordered at: 12/27/23 8839     Code Status (Patient has no pulse and is not breathing):    CPR (Attempt to Resuscitate)     Medical Interventions (Patient has pulse or is breathing):    Full Support       Ward Babb MD  01/05/24

## 2024-01-05 NOTE — CASE MANAGEMENT/SOCIAL WORK
Continued Stay Note  [facility]     Patient Name: Chano Rees  MRN: [id number]  Today's Date: 1/5/2024    Admit Date: 12/27/2023    Plan: rehab   Discharge Plan       Row Name 01/05/24 1139       Plan    Plan rehab    Patient/Family in Agreement with Plan yes    Plan Comments CM following up discharge planning. Rehab referrals pending for [facility] and [facility] in [place]. CM will continue to follow up.    Note update 1626: Precert started for rehab at [facility]. Family is agreeable.     Final Discharge Disposition Code 62 - inpatient rehab facility                   Discharge Codes    No documentation.                 Expected Discharge Date and Time       Expected Discharge Date Expected Discharge Time    Jan 8, 2024               Monica Vick RN

## 2024-01-05 NOTE — DISCHARGE PLACEMENT REQUEST
"  [phone]    Chano Winkler (53 y.o. Male)       Date of Birth   1970    Social Security Number       Address   [address]  [facility] [address]    Home Phone   [phone]    MRN   [id number]       Faith   None    Marital Status   Single                            Admission Date   12/27/23    Admission Type   Emergency    Admitting Provider   Ward Babb MD    Attending Provider   Ward Babb MD    Department, Room/Bed   [facility] 3F, S301/1       Discharge Date       Discharge Disposition       Discharge Destination                                 Attending Provider: Ward Babb MD    Allergies: Shellfish-derived Products    Isolation: None   Infection: None   Code Status: CPR    Ht: 180.3 cm (71\")   Wt: 117 kg (257 lb 15 oz)    Admission Cmt: None   Principal Problem: Rt Thalamic ICH [I61.9]                   Active Insurance as of 12/27/2023       Primary Coverage       Payor Plan Insurance Group Employer/Plan Group    [employer] [place] EXCHANGE [employer] EXCHANGE [id number]       Payor Plan Address Payor Plan Phone Number Payor Plan Fax Number Effective Dates    [address] [phone]  12/27/2023 - None Entered    [facility] [address]         Subscriber Name Subscriber Birth Date Member ID       CHANO WINKLER 1970 [id number]                     Emergency Contacts        (Rel.) Home Phone Work Phone Mobile Phone    Amrita Jones (Significant Other) -- -- [phone]    Eboni Winkler (Mother) -- -- [phone]                 Physician Progress Notes (most recent note)        Manuel Boone MD at 01/04/24 1239            Intensive Care Follow-up     Hospital:  LOS: 8 days   Mr. Chano Winkler, 53 y.o. male is followed for:   ICH (intracerebral hemorrhage)     Subjective       History of present illness:   53-year-old male with past medical history significant for hypertension and poor medical " compliance.  Patient presented to emergency room on 12/27 with sudden onset of left-sided weakness and headache.  Patient had significantly elevated blood pressure readings in the emergency room.  CT head revealed 3 cm right thalamic ICH.  CTA was unremarkable and it was felt that patient with hypertensive bleed.  Patient was admitted to ICU for blood pressure control.  Patient apparently has history of alcoholism but was not drinking alcohol lately.  Patient had waxing and waning mental status.  Required respiratory assistance with BiPAP support.  He was having trouble with secretions and low-grade fever so empirically started on Zosyn therapy as well.    Patient was up to encephalopathy requiring Precedex infusion but slowly improving.    Subjective   Interval History:  Overnight no acute events.  Today morning patient seen sitting out in chair.  Alert and oriented.  Denies any other acute issues.  Nicardipine drip has been turned off.  Will adjust oral medications.  Goal systolic blood pressure less than 150.               The patient's past medical, surgical and social history were reviewed and updated in Epic as appropriate.    Objective   Objective     Infusions:  dexmedetomidine, 0.2-1.5 mcg/kg/hr, Last Rate: Stopped (01/04/24 0810)  niCARdipine, 5-15 mg/hr, Last Rate: Stopped (01/04/24 0920)      Medications:  [START ON 1/5/2024] amLODIPine, 10 mg, Oral, Q24H  amLODIPine, 5 mg, Oral, Once  lisinopril, 20 mg, Oral, Q24H  metoprolol tartrate, 50 mg, Oral, Q12H  multivitamin pediatric chewable, 1 tablet, Oral, Daily  nicotine, 1 patch, Transdermal, Q24H  piperacillin-tazobactam, 4.5 g, Intravenous, Q8H  ProSource No Carb, 30 mL, Oral, BID  sodium chloride, 10 mL, Intravenous, Q12H  thiamine, 100 mg, Oral, Daily        Vital Sign Min/Max for last 24 hours  Temp  Min: 97.8 °F (36.6 °C)  Max: 98.3 °F (36.8 °C)   BP  Min: 97/79  Max: 150/104   Pulse  Min: 56  Max: 84   Resp  Min: 18  Max: 20   SpO2  Min: 91 %  Max:  "100 %   No data recorded       Input/Output for last 24 hour shift  01/03 0701 - 01/04 0700  In: 1581.5 [P.O.:240; I.V.:1141.5]  Out: 3925 [Urine:3925]      Objective:  Vital signs: (most recent): Blood pressure 145/89, pulse 74, temperature 97.9 °F (36.6 °C), temperature source Oral, resp. rate 20, height 180.3 cm (71\"), weight 117 kg (257 lb 15 oz), SpO2 98%.            General Appearance: Awake and alert and oriented  head:  Pupils reactive & symmetrical B/L.   Lungs:   B/L Breath sounds present with decreased breath sounds on bases, no wheezing heard, no crackles.   Heart: S1 and S2 present, no murmur  Abdomen: Soft, nontender, no guarding or rigidity, bowel sounds positive.  Extremities: + edema, warm to touch.  Neurologic: Weakness left upper extremity, moving lower extremities well.      Results from last 7 days   Lab Units 01/04/24  0425 01/03/24  0335 01/02/24  0530   WBC 10*3/mm3 13.24* 11.07* 11.33*   HEMOGLOBIN g/dL 15.5 14.4 14.7   PLATELETS 10*3/mm3 271 227 201     Results from last 7 days   Lab Units 01/04/24  0425 01/03/24  0335 01/02/24  0530 01/01/24  1756 01/01/24  0459 12/31/23  0405 12/30/23  0457   SODIUM mmol/L 138 140 141  --  139   < > 139   POTASSIUM mmol/L 4.5 4.5 4.6   < > 3.4*   < > 4.2   CO2 mmol/L 23.0 22.0 19.0*  --  20.0*   < > 21.0*   BUN mg/dL 26* 30* 30*  --  28*   < > 25*   CREATININE mg/dL 1.17 0.97 1.15  --  1.18   < > 1.40*   MAGNESIUM mg/dL  --  2.3  --   --  2.6  --  1.9   PHOSPHORUS mg/dL  --  3.4  --   --  2.7  --  4.7*   GLUCOSE mg/dL 107* 108* 103*  --  97   < > 109*    < > = values in this interval not displayed.     Estimated Creatinine Clearance: 95 mL/min (by C-G formula based on SCr of 1.17 mg/dL).    Results from last 7 days   Lab Units 01/01/24  0530   PH, ARTERIAL pH units 7.425   PCO2, ARTERIAL mm Hg 32.7*   PO2 ART mm Hg 80.0*       Images:   None new    I reviewed the patient's results and images.     Echocardiogram reviewed  Interpretation Summary         " Left ventricular systolic function is normal. Calculated left ventricular EF = 55.6%    Left ventricular diastolic function is consistent with (grade I) impaired relaxation.    Saline test results are negative.    Assessment & Plan   Impression        Rt Thalamic ICH    Hypertension    Non-compliance    History of alcohol abuse       Plan        1.  Patient presented here with ICH.  Continue to keep systolic blood pressure less than 140.  Neurology team is signed off.  Adjust oral antihypertensives.  Increase amlodipine to 10 mg daily.  Continue lisinopril and metoprolol.  Goal blood pressure  systolic less than 150.  As needed nicardipine.  2.  Ongoing encephalopathy which is improving.  Not requiring Precedex infusion currently.  More alert and oriented.  Continue thiamine supplementation.  Patient was treated for possible aspiration pneumonia with Zosyn.  Will finish off antibiotics.  Mild leukocytosis noted.  No other focus of infection at this time.  3.  Patient was diuresed yesterday and did well.  Urine output remains good.  Currently on room air.  Monitor closely for now.  Replace electrolytes per ICU protocol as needed.    4.  Speech evaluation and if he does well we will start oral nutrition..   5.  PT/OT.  6.  BiPAP at nighttime and during daytime as needed.    Patient can be transferred out of ICU to telemetry floor.  Eventually will need rehab placement. Will sign out to hospitalist team.     Plan of care and goals reviewed with multidisciplinary/antibiotic stewardship team during rounds.   I discussed the patient's findings and my recommendations with nursing staff       Manuel Boone MD, [facility]  Pulmonary, Critical care and Sleep Medicine         Electronically signed by Manuel Boone MD at 24 1246          Physical Therapy Notes (most recent note)        Fabby Villarreal, PT at 24 1094  Version 1 of 1         Patient Name: Chano Rees  : 1970    MRN: [id number]                               Today's Date: 1/4/2024       Admit Date: 12/27/2023    Visit Dx:     ICD-10-CM ICD-9-CM   1. Intracranial hemorrhage  I62.9 432.9   2. Uncontrolled hypertension  I10 401.9   3. Personal history of noncompliance with medical treatment  Z91.199 V15.81   4. Cognitive communication deficit  R41.841 799.52   5. Oropharyngeal dysphagia  R13.12 787.22     Patient Active Problem List   Diagnosis    Rt Thalamic ICH    Hypertension    Non-compliance    History of alcohol abuse     Past Medical History:   Diagnosis Date    Hypertension      Past Surgical History:   Procedure Laterality Date    ANKLE SURGERY Left       General Information       Row Name 01/04/24 0949          Physical Therapy Time and Intention    Document Type therapy note (daily note)  -AY     Mode of Treatment physical therapy;co-treatment  -AY       Row Name 01/04/24 0949          General Information    Patient Profile Reviewed yes  -AY     Existing Precautions/Restrictions fall;other (see comments)  L weakness/neglect  -AY     Barriers to Rehab medically complex;cognitive status;visual deficit  -AY       Row Name 01/04/24 0949          Cognition    Orientation Status (Cognition) oriented x 3  -AY       Row Name 01/04/24 0949          Safety Issues, Functional Mobility    Safety Issues Affecting Function (Mobility) insight into deficits/self-awareness;sequencing abilities;awareness of need for assistance;safety precaution awareness  -AY     Impairments Affecting Function (Mobility) balance;cognition;coordination;endurance/activity tolerance;motor control;motor planning;postural/trunk control;range of motion (ROM);strength;visual/perceptual  -AY     Cognitive Impairments, Mobility Safety/Performance attention  -AY               User Key  (r) = Recorded By, (t) = Taken By, (c) = Cosigned By      Initials Name Provider Type    AY Fabby Villarreal PT Physical Therapist                   Mobility       Row Name 01/04/24 4350          Bed Mobility     Bed Mobility supine-sit  -AY     Supine-Sit [place] (Bed Mobility) moderate assist (50% patient effort);2 person assist;verbal cues  -AY     Assistive Device (Bed Mobility) draw sheet;bed rails;head of bed elevated  -AY     Comment, (Bed Mobility) cueing for sequencing and hand placement.  -AY       Row Name 01/04/24 0950          Bed-Chair Transfer    Bed-Chair [place] (Transfers) maximum assist (25% patient effort);2 person assist;verbal cues  -AY     Assistive Device (Bed-Chair Transfers) other (see comments)  BUE support  -AY     Comment, (Bed-Chair Transfer) significant L knee buckling  -AY       Row Name 01/04/24 0950          Sit-Stand Transfer    Sit-Stand [place] (Transfers) maximum assist (25% patient effort);2 person assist;verbal cues;nonverbal cues (demo/gesture)  -AY     Assistive Device (Sit-Stand Transfers) other (see comments)  BUE Support  -AY       Row Name 01/04/24 0950          Gait/Stairs (Locomotion)    [place] Level (Gait) not tested  -AY               User Key  (r) = Recorded By, (t) = Taken By, (c) = Cosigned By      Initials Name Provider Type    AY Fabby Villarreal PT Physical Therapist                   Obj/Interventions       Row Name 01/04/24 0953          Motor Skills    Therapeutic Exercise hip;knee;ankle  -AY       Row Name 01/04/24 0953          Hip (Therapeutic Exercise)    Hip (Therapeutic Exercise) AAROM (active assistive range of motion)  -AY     Hip AAROM (Therapeutic Exercise) left;flexion;aBduction;10 repetitions;sitting  -AY       Row Name 01/04/24 0953          Knee (Therapeutic Exercise)    Knee (Therapeutic Exercise) AAROM (active assistive range of motion)  -AY     Knee AAROM (Therapeutic Exercise) left;flexion;extension;sitting;10 repetitions  -AY       Row Name 01/04/24 0953          Ankle (Therapeutic Exercise)    Ankle AROM (Therapeutic Exercise) left;dorsiflexion;plantarflexion;10 repetitions;sitting  -AY       Row Name 01/04/24 0953           Balance    Balance Assessment sitting static balance;sitting dynamic balance;sit to stand dynamic balance;standing static balance;standing dynamic balance  -AY     Static Sitting Balance minimal assist  -AY     Dynamic Sitting Balance moderate assist  -AY     Position, Sitting Balance unsupported;sitting in chair;sitting edge of bed  -AY     Sit to Stand Dynamic Balance maximum assist  -AY     Static Standing Balance maximum assist  -AY     Dynamic Standing Balance maximum assist  -AY     Position/Device Used, Standing Balance supported  -AY     Comment, Balance L lateral lean; improved with cueing/awareness  -AY               User Key  (r) = Recorded By, (t) = Taken By, (c) = Cosigned By      Initials Name Provider Type    AY Fabby Villarreal, PT Physical Therapist                   Goals/Plan    No documentation.                  Clinical Impression       Row Name 01/04/24 0954          Pain    Pretreatment Pain Rating 0/10 - no pain  -AY     Posttreatment Pain Rating 0/10 - no pain  -AY     Pain Intervention(s) Ambulation/increased activity;Repositioned  -AY     Additional Documentation Pain Scale: Numbers Pre/Post-Treatment (Group)  -AY       Row Name 01/04/24 0954          Plan of Care Review    Plan of Care Reviewed With patient;family  -AY     Progress improving  -AY     Outcome Evaluation Pt showing improvement as he progressed with mobility to pivot to chair with max Ax2; limited by L weakness and balance deficit compared to baseline. Pt/family educated about HEP performance throughout day. Cont to progress as able. d/c rec for IRF.  -AY       Row Name 01/04/24 0954          Vital Signs    Pre Systolic BP Rehab 139  -AY     Pre Treatment Diastolic BP 94  -AY     Post Systolic BP Rehab 132  -AY     Post Treatment Diastolic BP 89  -AY     Pretreatment Heart Rate (beats/min) 70  -AY     Posttreatment Heart Rate (beats/min) 66  -AY     Pre SpO2 (%) 96  -AY     O2 Delivery Pre Treatment room air  -AY     O2  Delivery Intra Treatment room air  -AY     Post SpO2 (%) 95  -AY     O2 Delivery Post Treatment room air  -AY     Pre Patient Position Supine  -AY     Intra Patient Position Standing  -AY     Post Patient Position Sitting  -AY       Row Name 01/04/24 0954          Positioning and Restraints    Pre-Treatment Position in bed  -AY     Post Treatment Position chair  -AY     In Chair notified nsg;reclined;sitting;call light within reach;encouraged to call for assist;exit alarm on;legs elevated;LUE elevated;RUE elevated;with family/caregiver;on mechanical lift sling;waffle cushion  -AY               User Key  (r) = Recorded By, (t) = Taken By, (c) = Cosigned By      Initials Name Provider Type    Fabby Quiroga PT Physical Therapist                   Outcome Measures       Row Name 01/04/24 0956          How much help from another person do you currently need...    Turning from your back to your side while in flat bed without using bedrails? 2  -AY     Moving from lying on back to sitting on the side of a flat bed without bedrails? 2  -AY     Moving to and from a bed to a chair (including a wheelchair)? 2  -AY     Standing up from a chair using your arms (e.g., wheelchair, bedside chair)? 2  -AY     Climbing 3-5 steps with a railing? 1  -AY     To walk in hospital room? 1  -AY     AM-PAC 6 Clicks Score (PT) 10  -AY     Highest Level of Mobility Goal 4 --> Transfer to chair/commode  -AY       Row Name 01/04/24 0956          Functional Assessment    Outcome Measure Options AM-PAC 6 Clicks Basic Mobility (PT)  -AY               User Key  (r) = Recorded By, (t) = Taken By, (c) = Cosigned By      Initials Name Provider Type    Fabby Quiroga PT Physical Therapist                                 Physical Therapy Education       Title: PT OT SLP Therapies (In Progress)       Topic: Physical Therapy (In Progress)       Point: Mobility training (In Progress)       Learning Progress Summary             Patient Acceptance,  E,TB, NR by AY at 1/4/2024 0956    Acceptance, E,TB, NR by AY at 1/3/2024 1420    Acceptance, E,TB, NR by AY at 1/1/2024 1402    Acceptance, E, NR,VU by ZS at 12/29/2023 0752    Acceptance, E, NR by KG at 12/28/2023 0845   Family Acceptance, E,TB, NR by AY at 1/4/2024 0956    Acceptance, E, NR,VU by ZS at 12/29/2023 0752                         Point: Home exercise program (In Progress)       Learning Progress Summary             Patient Acceptance, E,TB, NR by AY at 1/4/2024 0956    Acceptance, E,TB, NR by AY at 1/3/2024 1420    Acceptance, E,TB, NR by AY at 1/1/2024 1402   Family Acceptance, E,TB, NR by AY at 1/4/2024 0956                         Point: Body mechanics (In Progress)       Learning Progress Summary             Patient Acceptance, E,TB, NR by AY at 1/4/2024 0956    Acceptance, E,TB, NR by AY at 1/3/2024 1420    Acceptance, E,TB, NR by AY at 1/1/2024 1402    Acceptance, E, NR by KG at 12/28/2023 0845   Family Acceptance, E,TB, NR by AY at 1/4/2024 0956                         Point: Precautions (In Progress)       Learning Progress Summary             Patient Acceptance, E,TB, NR by AY at 1/4/2024 0956    Acceptance, E,TB, NR by AY at 1/3/2024 1420    Acceptance, E,TB, NR by AY at 1/1/2024 1402    Acceptance, E, NR by KG at 12/28/2023 0845   Family Acceptance, E,TB, NR by AY at 1/4/2024 0956                                         User Key       Initials Effective Dates Name Provider Type Discipline    KG 05/22/20 -  Katina Anderson, PT Physical Therapist PT    ANITA 02/13/23 -  Jerald Spencer, RN Registered Nurse Nurse    ISABEL 11/10/20 -  Fabby Villarreal, PT Physical Therapist PT                  PT Recommendation and Plan     Plan of Care Reviewed With: patient, family  Progress: improving  Outcome Evaluation: Pt showing improvement as he progressed with mobility to pivot to chair with max Ax2; limited by L weakness and balance deficit compared to baseline. Pt/family educated about HEP performance  throughout day. Cont to progress as able. d/c rec for IRF.     Time Calculation:         PT Charges       Row Name 24 0957             Time Calculation    Start Time 0825  -AY      PT Received On 24  -AY         Timed Charges    77415 - PT Therapeutic Exercise Minutes 5  -AY      31321 - PT Therapeutic Activity Minutes 5  -AY         Total Minutes    Timed Charges Total Minutes 10  -AY       Total Minutes 10  -AY                User Key  (r) = Recorded By, (t) = Taken By, (c) = Cosigned By      Initials Name Provider Type    AY Fabby Villarreal, PT Physical Therapist                  Therapy Charges for Today       Code Description Service Date Service Provider Modifiers Qty    15416067965 HC PT THER PROC EA 15 MIN 1/3/2024 Fabby Villarreal, PT GP 2    57109156586 HC PT THERAPEUTIC ACT EA 15 MIN 1/3/2024 Fabby Villarreal, PT GP 2    87821756289 HC PT THER SUPP EA 15 MIN 1/3/2024 Fabby Villarreal, PT GP 3    50323207423 HC PT THER PROC EA 15 MIN 2024 Fabby Villarreal, PT GP 1            PT G-Codes  Outcome Measure Options: AM-PAC 6 Clicks Basic Mobility (PT)  AM-PAC 6 Clicks Score (PT): 10  AM-PAC 6 Clicks Score (OT): 6  Modified Ciales Scale: 4 - Moderately severe disability.  Unable to walk without assistance, and unable to attend to own bodily needs without assistance.  PT Discharge Summary  Anticipated Discharge Disposition (PT): inpatient rehabilitation facility    Fabby Villarreal PT  2024      Electronically signed by Fabby Villarreal PT at 24 0957          Occupational Therapy Notes (most recent note)        Halley Ceron, OT at 24 0835          Patient Name: Chano Rees  : 1970    MRN: [id number]                              Today's Date: 2024       Admit Date: 2023    Visit Dx:     ICD-10-CM ICD-9-CM   1. Intracranial hemorrhage  I62.9 432.9   2. Uncontrolled hypertension  I10 401.9   3. Personal history of noncompliance with medical treatment  Z91.199 V15.81   4.  Cognitive communication deficit  R41.841 799.52   5. Oropharyngeal dysphagia  R13.12 787.22     Patient Active Problem List   Diagnosis    Rt Thalamic ICH    Hypertension    Non-compliance    History of alcohol abuse     Past Medical History:   Diagnosis Date    Hypertension      Past Surgical History:   Procedure Laterality Date    ANKLE SURGERY Left       General Information       Row Name 01/04/24 1420          OT Time and Intention    Document Type therapy note (daily note)  -CS     Mode of Treatment occupational therapy;co-treatment  -CS       Row Name 01/04/24 1420          General Information    Patient Profile Reviewed yes  -CS     Existing Precautions/Restrictions fall;other (see comments)  L sided weakness/inattention  -CS     Barriers to Rehab medically complex;previous functional deficit;visual deficit  -CS       Row Name 01/04/24 1420          Living Environment    People in Home significant other  -CS       Row Name 01/04/24 1420          Home Main Entrance    Number of Stairs, Main Entrance none  -CS       Row Name 01/04/24 1420          Cognition    Orientation Status (Cognition) oriented x 3  -CS       Row Name 01/04/24 1420          Safety Issues, Functional Mobility    Impairments Affecting Function (Mobility) balance;cognition;coordination;endurance/activity tolerance;motor control;motor planning;postural/trunk control;range of motion (ROM);strength;visual/perceptual  -CS     Cognitive Impairments, Mobility Safety/Performance attention;insight into deficits/self-awareness;sequencing abilities  -CS               User Key  (r) = Recorded By, (t) = Taken By, (c) = Cosigned By      Initials Name Provider Type    CS Halley Ceron OT Occupational Therapist                     Mobility/ADL's       Row Name 01/04/24 1420          Bed Mobility    Bed Mobility supine-sit  -CS     Supine-Sit [place] (Bed Mobility) moderate assist (50% patient effort);2 person assist;verbal cues  -CS     Assistive  Device (Bed Mobility) bed rails;draw sheet;head of bed elevated  -       Row Name 01/04/24 1420          Transfers    Transfers bed-chair transfer;sit-stand transfer;stand-sit transfer  -     Comment, (Transfers) BUE support w/ B knees blocked w/ noted B knee buckling L>R  -       Row Name 01/04/24 1420          Bed-Chair Transfer    Bed-Chair [place] (Transfers) maximum assist (25% patient effort);2 person assist;verbal cues  -       Row Name 01/04/24 1420          Sit-Stand Transfer    Sit-Stand [place] (Transfers) maximum assist (25% patient effort);2 person assist;verbal cues  -       Row Name 01/04/24 1420          Stand-Sit Transfer    Stand-Sit [place] (Transfers) maximum assist (25% patient effort);2 person assist;verbal cues  -       Row Name 01/04/24 1420          Activities of Daily Living    BADL Assessment/Intervention lower body dressing;grooming  -[facility] Name 01/04/24 1420          Lower Body Dressing Assessment/Training    [place] Level (Lower Body Dressing) don;socks;dependent (less than 25% patient effort)  -     Position (Lower Body Dressing) supine  -[facility] Name 01/04/24 1420          Grooming Assessment/Training    [place] Level (Grooming) wash face, hands;moderate assist (50% patient effort)  -CS     Position (Grooming) supported sitting  -               User Key  (r) = Recorded By, (t) = Taken By, (c) = Cosigned By      Initials Name Provider Type    Halley Carlos OT Occupational Therapist                   Obj/Interventions       Row Name 01/04/24 1424          Shoulder (Therapeutic Exercise)    Shoulder AAROM (Therapeutic Exercise) right assist left;flexion;10 repetitions  -       Row Name 01/04/24 1424          Wrist (Therapeutic Exercise)    Wrist AAROM (Therapeutic Exercise) right assist left;flexion;extension;10 repetitions  -[facility] Name 01/04/24 1424          Motor Skills    Therapeutic Exercise shoulder;elbow/forearm   -       Row Name 01/04/24 1424          Balance    Balance Assessment sitting static balance;sitting dynamic balance;standing static balance;standing dynamic balance  -CS     Static Sitting Balance minimal assist  -CS     Dynamic Sitting Balance moderate assist  -CS     Position, Sitting Balance sitting edge of bed  -CS     Static Standing Balance maximum assist;2-person assist  -CS     Dynamic Standing Balance maximum assist;2-person assist  -CS     Position/Device Used, Standing Balance supported  -CS     Balance Interventions sitting;standing;static;dynamic;dynamic reaching;occupation based/functional task;weight shifting activity  -CS               User Key  (r) = Recorded By, (t) = Taken By, (c) = Cosigned By      Initials Name Provider Type    CS Halley Ceron OT Occupational Therapist                   Goals/Plan    No documentation.                  Clinical Impression       Row Name 01/04/24 1424          Pain Assessment    Pretreatment Pain Rating 0/10 - no pain  -CS     Posttreatment Pain Rating 0/10 - no pain  -       Row Name 01/04/24 1424          Plan of Care Review    Plan of Care Reviewed With patient  -CS     Progress improving  -     Outcome Evaluation Pt demo improved alertness and independence by progressing bed mobility to Mod Ax2. Pt conts to be limited d/t L sided weakness/visual deficits; educated on compensatory strategies to improved independence. Recommend pt DC to IP rehab.  -       Row Name 01/04/24 1424          Therapy Plan Review/Discharge Plan (OT)    Anticipated Discharge Disposition (OT) inpatient rehabilitation facility  -       Row Name 01/04/24 1424          Vital Signs    Pre Systolic BP Rehab 139  -CS     Pre Treatment Diastolic BP 94  -CS     Post Systolic BP Rehab 132  -CS     Post Treatment Diastolic BP 89  -CS     Pretreatment Heart Rate (beats/min) 69  -CS     Posttreatment Heart Rate (beats/min) 70  -CS     Pre SpO2 (%) 97  -CS     O2 Delivery Pre Treatment  room air  -CS     Post SpO2 (%) 97  -CS     O2 Delivery Post Treatment room air  -CS     Pre Patient Position Supine  -CS     Intra Patient Position Standing  -CS     Post Patient Position Sitting  -CS       Row Name 01/04/24 1424          Positioning and Restraints    Pre-Treatment Position in bed  -CS     Post Treatment Position chair  -CS     In Chair notified nsg;reclined;call light within reach;encouraged to call for assist;exit alarm on;RUE elevated;LUE elevated;waffle cushion;on mechanical lift sling;legs elevated;heels elevated  -CS               User Key  (r) = Recorded By, (t) = Taken By, (c) = Cosigned By      Initials Name Provider Type    CS Halley Ceron, ATIF Occupational Therapist                   Outcome Measures       Row Name 01/04/24 1426          How much help from another is currently needed...    Putting on and taking off regular lower body clothing? 1  -CS     Bathing (including washing, rinsing, and drying) 1  -CS     Toileting (which includes using toilet bed pan or urinal) 1  -CS     Putting on and taking off regular upper body clothing 2  -CS     Taking care of personal grooming (such as brushing teeth) 2  -CS     Eating meals 2  -CS     AM-PAC 6 Clicks Score (OT) 9  -CS       Row Name 01/04/24 0956 01/04/24 0800       How much help from another person do you currently need...    Turning from your back to your side while in flat bed without using bedrails? 2  -AY 3  -CM    Moving from lying on back to sitting on the side of a flat bed without bedrails? 2  -AY 2  -CM    Moving to and from a bed to a chair (including a wheelchair)? 2  -AY 2  -CM    Standing up from a chair using your arms (e.g., wheelchair, bedside chair)? 2  -AY 1  -CM    Climbing 3-5 steps with a railing? 1  -AY 1  -CM    To walk in hospital room? 1  -AY 1  -CM    AM-PAC 6 Clicks Score (PT) 10  -AY 10  -CM    Highest Level of Mobility Goal 4 --> Transfer to chair/commode  -AY 4 --> Transfer to chair/commode  -CM      Row  Name 01/04/24 1426 01/04/24 0956       Functional Assessment    Outcome Measure Options AM-PAC 6 Clicks Daily Activity (OT)  -CS AM-PAC 6 Clicks Basic Mobility (PT)  -AY              User Key  (r) = Recorded By, (t) = Taken By, (c) = Cosigned By      Initials Name Provider Type    Halley Carlos OT Occupational Therapist    Jeanie Paulino, RN Registered Nurse    AY Fabby Villarreal, PT Physical Therapist                    Occupational Therapy Education       Title: PT OT SLP Therapies (In Progress)       Topic: Occupational Therapy (In Progress)       Point: ADL training (In Progress)       Description:   Instruct learner(s) on proper safety adaptation and remediation techniques during self care or transfers.   Instruct in proper use of assistive devices.                  Learning Progress Summary             Patient Acceptance, E, NR by CS at 1/4/2024 1427    Acceptance, E, NR by CS at 1/1/2024 1409    Acceptance, E, VU,NR by AN at 12/28/2023 1531   Family Acceptance, E, VU,NR by AN at 12/28/2023 1531                         Point: Home exercise program (In Progress)       Description:   Instruct learner(s) on appropriate technique for monitoring, assisting and/or progressing therapeutic exercises/activities.                  Learning Progress Summary             Patient Acceptance, E, NR by CS at 1/4/2024 1427    Acceptance, E, NR by CS at 1/1/2024 1409                         Point: Precautions (In Progress)       Description:   Instruct learner(s) on prescribed precautions during self-care and functional transfers.                  Learning Progress Summary             Patient Acceptance, E, NR by CS at 1/4/2024 1427    Acceptance, E, NR by CS at 1/1/2024 1409    Acceptance, E, VU,NR by AN at 12/28/2023 1531   Family Acceptance, E, VU,NR by AN at 12/28/2023 1531                         Point: Body mechanics (In Progress)       Description:   Instruct learner(s) on proper positioning and spine alignment  during self-care, functional mobility activities and/or exercises.                  Learning Progress Summary             Patient Acceptance, E, NR by CS at 1/4/2024 1427    Acceptance, E, NR by CS at 1/1/2024 1409    Acceptance, E, VU,NR by AN at 12/28/2023 1531   Family Acceptance, E, VU,NR by AN at 12/28/2023 1531                                         User Key       Initials Effective Dates Name Provider Type Discipline     09/02/21 -  Halley Ceron OT Occupational Therapist OT    BREEZY 09/21/21 -  Princess Gtz OT Occupational Therapist OT                  OT Recommendation and Plan     Plan of Care Review  Plan of Care Reviewed With: patient  Progress: improving  Outcome Evaluation: Pt demo improved alertness and independence by progressing bed mobility to Mod Ax2. Pt conts to be limited d/t L sided weakness/visual deficits; educated on compensatory strategies to improved independence. Recommend pt DC to IP rehab.     Time Calculation:         Time Calculation- OT       Row Name 01/04/24 1427             Time Calculation- OT    OT Start Time 0835  -CS      OT Received On 01/04/24  -CS      OT Goal Re-Cert Due Date 01/07/24  -CS         Timed Charges    16527 - OT Therapeutic Exercise Minutes 2  -CS      19471 - OT Therapeutic Activity Minutes 5  -CS      16114 - OT Self Care/Mgmt Minutes 3  -CS         Total Minutes    Timed Charges Total Minutes 10  -CS       Total Minutes 10  -CS                User Key  (r) = Recorded By, (t) = Taken By, (c) = Cosigned By      Initials Name Provider Type    CS Halley Ceron OT Occupational Therapist                  Therapy Charges for Today       Code Description Service Date Service Provider Modifiers Qty    [id number]  OT THERAPEUTIC ACT EA 15 MIN 1/4/2024 Halley Ceron OT GO 1                 Halley Ceron OT  1/4/2024    Electronically signed by Halley Ceron OT at 01/04/24 2009

## 2024-01-05 NOTE — THERAPY TREATMENT NOTE
Acute Care - Speech Language Pathology   Swallow and Communication Treatment Note [facility]     Patient Name: Chano Rees  : 1970  MRN: [id number]  Today's Date: 2024               Admit Date: 2023    Visit Dx:     ICD-10-CM ICD-9-CM   1. Intracranial hemorrhage  I62.9 432.9   2. Uncontrolled hypertension  I10 401.9   3. Personal history of noncompliance with medical treatment  Z91.199 V15.81   4. Cognitive communication deficit  R41.841 799.52   5. Oropharyngeal dysphagia  R13.12 787.22     Patient Active Problem List   Diagnosis    Rt Thalamic ICH    Hypertension    Non-compliance    History of alcohol abuse     Past Medical History:   Diagnosis Date    Hypertension      Past Surgical History:   Procedure Laterality Date    ANKLE SURGERY Left        SLP Recommendation and Plan     SLP Diet Recommendation: regular textures, no mixed consistencies, honey thick liquids, ice chips between meals after oral care, with supervision (24)  Recommended Precautions and Strategies: upright posture during/after eating, general aspiration precautions (24)  SLP Rec. for Method of Medication Administration: meds whole, with thick liquids, with puree (24)     Monitor for Signs of Aspiration: notify SLP if any concerns (24)        Anticipated Discharge Disposition (SLP): inpatient rehabilitation facility (24)     Therapy Frequency (Swallow): 5 days per week (24)  Predicted Duration Therapy Intervention (Days): until discharge (24)  Oral Care Recommendations: Oral Care BID/PRN, Toothbrush (24)        Daily Summary of Progress (SLP): progress toward functional goals is good (24)         Patient/Family Concerns, Anticipated Discharge Disposition (SLP): Mother and father with numerous questions re: admission to [facility] (24)     Treatment Assessment (SLP): continued, oral dysphagia, pharyngeal dysphagia,  cognitive-linguistic disorder (01/05/24 1500)  Treatment Assessment Comments (SLP): Pt seen for dysphagia and s/l tx. Improvement overall though family endorses that pt is significantly fatigued. Consider repeat instrumental early next week (01/05/24 1500)  Plan for Continued Treatment (SLP): continue treatment per plan of care (01/05/24 1500)       Oral Care Recommendations: Oral Care BID/PRN, Toothbrush (01/05/24 1500)    Plan of Care Reviewed With: patient, father, mother, significant other  Progress: improving      SWALLOW EVALUATION (last 72 hours)       SLP Adult Swallow Evaluation       Row Name 01/05/24 1500 01/03/24 1100                Rehab Evaluation    Document Type therapy note (daily note)  -RS re-evaluation;therapy note (daily note)  -       Subjective Information no complaints  -RS no complaints  -       Patient Observations alert  -RS alert;cooperative  -       Patient/Family/Caregiver Comments/Observations S/o, mother and father present  -RS --       Patient Effort good  -RS good  -SM       Oral Care patient refused intervention  -RS --          General Information    Pertinent History Of Current Problem -- RN reported overall improved compared to yesterday. ready for re-assessment.  -       Current Method of Nutrition -- NPO;small-bore;nasogastric feedings  -       Plans/Goals Discussed with -- patient;spouse/S.O.;agreed upon  -       Barriers to Rehab -- none identified  -       Patient's Goals for Discharge -- return to all previous roles/activities  -       Family Goals for Discharge -- patient able to return to all previous activities/roles  -          Pain    Additional Documentation Pain Scale: FACES Pre/Post-Treatment (Group)  -RS Pain Scale: FACES Pre/Post-Treatment (Group)  -SM          Pain Scale: FACES Pre/Post-Treatment    Pain: FACES Scale, Pretreatment 0-->no hurt  -RS 2-->hurts little bit  -       Posttreatment Pain Rating 0-->no hurt  -RS 2-->hurts little bit   -       Pain Location -- generalized  -       Pain Location - -- throat  -          General Eating/Swallowing Observations    Respiratory Support Currently in Use -- room air  -          Clinical Swallow Eval    Oral Prep Phase -- impaired  -SM       Pharyngeal Phase -- suspected pharyngeal impairment  -       Clinical Swallow Evaluation Summary -- Generalized weakness, intermittent multiple swallows. Risk aspiration. FEES followed this assessment. Continued NPO x ok PO meds in puree.  -          Fiberoptic Endoscopic Evaluation of Swallowing (FEES)    Risks/Benefits Reviewed -- risks/benefits explained;patient;family;agreed to eval  -       Nasal Entry -- right:  -       Scope serial number/identification -- [id number]  -          Anatomy and Physiology    Velopharyngeal -- WFL  -       Base of Tongue -- symmetrical;range adequate  -       Epiglottis -- WFL  -       Laryngeal Function Breathing -- decreased movement right  -       Laryngeal Function Phonation -- asymmetrical;decreased movement right  -       Laryngeal Function to Breath Hold -- TVF contact;sustained closure  -       Secretion Rating Scale (Angel et al. 1996) -- 1- secretions present around the laryngeal vestibule  -       Secretion Description -- thin;clear;white  -       Ice Chips -- DNA  -       Spontaneous Swallow -- frequency adequate  -       Sensory -- sensed scope  -       Utensils Used -- Spoon;Cup;Straw  -       Consistencies Trialed -- thin liquids;nectar-thick liquids;honey-thick liquids;pudding/puree;regular textures  -          FEES Interpretation    Oral Phase -- prolonged manipulation;increased A-P transit time;prespill of liquids into pharynx  -          Initiation of Pharyngeal Swallow    Initiation of Pharyngeal Swallow -- bolus in pyriform sinuses  -       Pharyngeal Phase -- impaired pharyngeal phase of swallowing  -       Penetration During the Swallow -- thin liquids;nectar-thick  liquids;secondary to delayed swallow initiation or mistiming;secondary to reduced laryngeal elevation;secondary to reduced vestibular closure  -SM       Aspiration After the Swallow -- thin liquids;secondary to residue;in laryngeal vestibule  -SM       Depth of Penetration -- deep  -SM       Response to Penetration -- No  -SM       No spontaneous response to penetration and -- effective laryngeal clearance with cue (see comments)  throat clearing  -SM       Response to Aspiration -- No  -SM       No spontaneous response to aspiration with -- effective subglottic clearance with cue (see comments);other (see comments)  cough  -SM       Rosenbek's Scale -- thin:;8-->Level 8;nectar:;5-->Level 5  -SM       Residue -- all consistencies tested;diffuse within pharynx;secondary to reduced base of tongue retraction;secondary to reduced posterior pharyngeal wall stripping;secondary to reduced laryngeal elevation;secondary to reduced hyolaryngeal excursion;other (see comments)  mild  -SM       Attempted Compensatory Maneuvers -- bolus size;other (see comments)  inconsistently prevented  -SM       Successful Compensatory Maneuver Competency -- patient unable to adequately demonstrate/teach back compensations  -SM       Pharyngeal Phase, Comment -- White secretions and laryngeal irritations, making assessment difficult as use white food items for study. Aspiration thin liquids, high risk with nectar-thick liquids, especially if not taking small sips. Aspiration silent. No aspiration with half tsp trials of thin liquids (i.e., ok ice chips between meals).  -          SLP Evaluation Clinical Impression    SLP Swallowing Diagnosis -- mild-moderate;oral dysphagia;moderate;pharyngeal dysphagia  -       Functional Impact -- risk of aspiration/pneumonia  -       Rehab Potential/Prognosis, Swallowing -- good, to achieve stated therapy goals  -       Swallow Criteria for Skilled Therapeutic Interventions Met -- demonstrates  skilled criteria  -          SLP Treatment Clinical Impressions    Treatment Assessment (SLP) continued;oral dysphagia;pharyngeal dysphagia;cognitive-linguistic disorder  -RS --       Treatment Assessment Comments (SLP) Pt seen for dysphagia and s/l tx. Improvement overall though family endorses that pt is significantly fatigued. Consider repeat instrumental early next week  -RS --       Daily Summary of Progress (SLP) progress toward functional goals is good  -RS --       Plan for Continued Treatment (SLP) continue treatment per plan of care  -RS --       Care Plan Review care plan/treatment goals reviewed;patient/other agree to care plan  -RS --       Care Plan Review, Other Participant(s) father;mother;significant other  -RS --          Recommendations    Therapy Frequency (Swallow) 5 days per week  -RS 5 days per week  -       Predicted Duration Therapy Intervention (Days) until discharge  -RS until discharge  -       SLP Diet Recommendation regular textures;no mixed consistencies;honey thick liquids;ice chips between meals after oral care, with supervision  -RS regular textures;no mixed consistencies;honey thick liquids;ice chips between meals after oral care, with supervision  -       Recommended Precautions and Strategies upright posture during/after eating;general aspiration precautions  - upright posture during/after eating;general aspiration precautions  -       Oral Care Recommendations Oral Care BID/PRN;Toothbrush  - Oral Care BID/PRN;Toothbrush  -       SLP Rec. for Method of Medication Administration meds whole;with thick liquids;with puree  -RS meds whole;with thick liquids;with puree  -SM       Monitor for Signs of Aspiration notify SLP if any concerns  -RS notify SLP if any concerns  -       Anticipated Discharge Disposition (SLP) inpatient rehabilitation facility  - inpatient rehabilitation facility  -       Patient/Family Concerns, Anticipated Discharge Disposition (SLP)  Mother and father with numerous questions re: admission to [facility]  -RS --                 User Key  (r) = Recorded By, (t) = Taken By, (c) = Cosigned By      Initials Name Effective Dates    SM Tyra Perez, MS CCC-SLP 02/03/23 -     RS TjMichael, MS CCC-SLP 09/14/23 -                     EDUCATION  The patient has been educated in the following areas:   Cognitive Impairment Communication Impairment Dysphagia (Swallowing Impairment).        SLP GOALS       Row Name 01/05/24 1600 01/05/24 1500 01/03/24 1100       (LTG) Patient will demonstrate functional swallow for    Diet Texture (Demonstrate functional swallow) -- regular textures  -RS regular textures  -SM    Liquid viscosity (Demonstrate functional swallow) -- thin liquids  -RS thin liquids  -SM    [place] (Demonstrate functional swallow) -- independently (over 90% accuracy)  -RS independently (over 90% accuracy)  -SM    Time Frame (Demonstrate functional swallow) -- by discharge  -RS by discharge  -SM    Progress/Outcomes (Demonstrate functional swallow) -- goal ongoing  -RS goal revised this date  -SM       (STG) Patient will tolerate trials of    Consistencies Trialed (Tolerate trials) -- thin liquids  -RS thin liquids  -SM    Desired Outcome (Tolerate trials) -- with use of compensatory strategies (see comments)  -RS with use of compensatory strategies (see comments)  with small single cup/straw sips  -SM    [place] (Tolerate trials) -- independently (over 90% accuracy)  -RS independently (over 90% accuracy)  -SM    Time Frame (Tolerate trials) -- by discharge  -RS by discharge  -SM    Progress/Outcomes (Tolerate trials) -- goal ongoing  -RS goal revised this date  -SM       (STG) Labial Strengthening Goal 1 (SLP)    Activity (Labial Strengthening Goal 1, SLP) -- increase labial tone  -RS increase labial tone  -SM    Increase Labial Tone -- labial resistance exercises  -RS labial resistance exercises  -SM    [place]/Accuracy (Labial  Strengthening Goal 1, SLP) -- with minimal cues (75-90% accuracy)  -RS with minimal cues (75-90% accuracy)  -SM    Time Frame (Labial Strengthening Goal 1, SLP) -- short term goal (STG);by discharge  -RS short term goal (STG);by discharge  -SM    Progress/Outcomes (Labial Strengthening Goal 1, SLP) -- continuing progress toward goal  -RS --    Comment (Labial Strengthening Goal 1, SLP) -- exs completed x10  -RS --       (STG) Lingual Strengthening Goal 1 (SLP)    Activity (Lingual Strengthening Goal 1, SLP) -- increase lingual tone/sensation/control/coordination/movement  -RS increase lingual tone/sensation/control/coordination/movement  -SM    Increase Lingual Tone/Sensation/Control/Coordination/Movement -- swallow trials;lingual resistance exercises  -RS swallow trials;lingual resistance exercises  -SM    [place]/Accuracy (Lingual Strengthening Goal 1, SLP) -- with minimal cues (75-90% accuracy)  -RS with minimal cues (75-90% accuracy)  -SM    Time Frame (Lingual Strengthening Goal 1, SLP) -- short term goal (STG)  -RS short term goal (STG)  -SM    Progress/Outcomes (Lingual Strengthening Goal 1, SLP) -- continuing progress toward goal  -RS --    Comment (Lingual Strengthening Goal 1, SLP) -- completed x10  -RS --       (STG) Pharyngeal Strengthening Exercise Goal 1 (SLP)    Activity (Pharyngeal Strengthening Goal 1, SLP) -- increase timing;increase superior movement of the hyolaryngeal complex;increase anterior movement of the hyolaryngeal complex;increase closure at entrance to airway/closure of airway at glottis;increase squeeze/positive pressure generation  -RS increase timing;increase superior movement of the hyolaryngeal complex;increase anterior movement of the hyolaryngeal complex;increase closure at entrance to airway/closure of airway at glottis;increase squeeze/positive pressure generation  -SM    Increase Timing -- prepping - 3 second prep or suck swallow or 3-step swallow  -RS prepping - 3 second  prep or suck swallow or 3-step swallow  -SM    Increase Superior Movement of the Hyolaryngeal Complex -- effortful pitch glide (falsetto + pharyngeal squeeze)  -RS effortful pitch glide (falsetto + pharyngeal squeeze)  -SM    Increase Anterior Movement of the Hyolaryngeal Complex -- chin tuck against resistance (CTAR)  -RS chin tuck against resistance (CTAR)  -SM    Increase Closure at Entrance to Airway/Closure of Airway at Glottis -- supraglottic swallow  -RS supraglottic swallow  -SM    Increase Squeeze/Positive Pressure Generation -- hard effortful swallow  -RS hard effortful swallow  -SM    [place]/Accuracy (Pharyngeal Strengthening Goal 1, SLP) -- with minimal cues (75-90% accuracy)  -RS with minimal cues (75-90% accuracy)  -SM    Time Frame (Pharyngeal Strengthening Goal 1, SLP) -- short term goal (STG)  -RS short term goal (STG)  -SM    Progress/Outcomes (Pharyngeal Strengthening Goal 1, SLP) -- continuing progress toward goal  -RS new goal  -SM    Comment (Pharyngeal Strengthening Goal 1, SLP) -- difficulty following directions for more complicated exs. Holds mouth open during timing trials, ? prespill with those trials.  -RS --       Patient will demonstrate functional speech skills for return to discharge environment    [place] -- Independently  -RS Independently  -SM    Time frame -- by discharge  -RS by discharge  -SM    Progress/Outcomes -- continuing progress toward goal  -RS continuing progress toward goal  -SM       Patient will demonstrate functional cognitive-linguistic skills for return to discharge environment    [place] -- with minimal cues  -RS with minimal cues  -SM    Time frame -- by discharge  -RS by discharge  -SM    Progress/Outcomes -- continuing progress toward goal  -RS continuing progress toward goal  -SM       SLP Diagnostic Treatment     Patient will participate in further assessment in the following areas -- reading comprehension;graphic expression  -RS reading  comprehension;graphic expression  -SM    Time Frame (Diagnostic) -- short term goal (STG)  -RS short term goal (STG)  -SM    Progress/Outcomes (Additional Goal 1, SLP) -- goal ongoing  -RS goal ongoing  -SM    Comment (Diagnostic) -- -- Focused this session on dysphagia with FEES. Target next session  -SM       Respiratory Support Goal 1 (SLP)    Improve Respiratory Support Goal 1 (SLP) -- repeating a sentence on exhalation;90%;with minimal cues (75-90%)  -RS repeating a sentence on exhalation;90%;with minimal cues (75-90%)  -SM    Time Frame (Respiratory Support Goal 1, SLP) -- short term goal (STG)  -RS short term goal (STG)  -SM    Barriers (Respiratory Support Goal 1, SLP) -- cognition/decreased awareness  -RS cognition/decreased awareness  -SM    Progress/Outcomes (Respiratory Support Goal 1, SLP) -- goal ongoing  -RS continuing progress toward goal  -SM       Articulation Goal 1 (SLP)    Improve Articulation Goal 1 (SLP) -- by over-articulating at word level;of specific sounds in words;with minimal cues (75-90%);90%  -RS by over-articulating at word level;of specific sounds in words;with minimal cues (75-90%);90%  /p, b/  -SM    Time Frame (Articulation Goal 1, SLP) -- short term goal (STG)  -RS short term goal (STG)  -SM    Progress (Articulation Goal 1, SLP) -- 50%;with moderate cues (50-74%)  -RS --    Progress/Outcomes (Articulation Goal 1, SLP) -- continuing progress toward goal  -RS goal ongoing  -SM       Patient Will Implement Strategies Goal 1 (SLP)    Implement Strategies of Goal 1 (SLP) -- using external rate control;90%;with minimal cues (75-90%)  -RS using external rate control;90%;with minimal cues (75-90%)  -SM    Time Frame (Strategy Implementation Goal 1, SLP) -- short term goal (STG)  -RS short term goal (STG)  -SM    Progress/Outcomes (Strategy Implementation Goal 1, SLP) -- goal ongoing  -RS goal ongoing  -SM       Attention Goal 1 (SLP)    Improve Attention by Goal 1 (SLP) complete  sustained attention task;90%;with minimal cues (75-90%)  -RS -- complete sustained attention task;90%;with minimal cues (75-90%)  -    Time Frame (Attention Goal 1, SLP) short term goal (STG)  -RS -- short term goal (STG)  -SM    Progress (Attention Goal 1, SLP) 50%;with moderate cues (50-74%)  -RS -- other (comment)  -SM    Progress/Outcomes (Attention Goal 1, SLP) continuing progress toward goal  -RS -- good progress toward goal  -SM    Comment (Attention Goal 1, SLP) -- -- Met eye contact and attend to basic task. Pt easily distracts self from sustained attention tasks, 50% with min cues (when attempted increased cueing, decreased deficit awareness impacted).  -       Memory Skills Goal 1 (SLP)    Improve Memory Skills Through Goal 1 (SLP) recalling related word lists immediately;80%;with moderate cues (50-74%)  -RS -- recalling related word lists immediately;80%;with moderate cues (50-74%)  -    Time Frame (Memory Skills Goal 1, SLP) short term goal (STG)  -RS -- short term goal (STG)  -SM    Progress/Outcomes (Memory Skills Goal 1, SLP) goal ongoing  -RS -- goal ongoing  -SM       Reasoning Goal 1 (SLP)    Improve Reasoning Through Goal 1 (SLP) complete high level reasoning task;complete mental flexibility task;90%;with moderate cues (50-74%)  -RS -- complete high level reasoning task;complete mental flexibility task;90%;with moderate cues (50-74%)  -    Time Frame (Reasoning Goal 1, SLP) short term goal (STG)  -RS -- short term goal (STG)  -SM    Progress/Outcomes (Reasoning Goal 1, SLP) goal ongoing  -RS -- goal ongoing  -SM       Functional Problem Solving Skills Goal 1 (SLP)    Improve Problem Solving Through Goal 1 (SLP) determine solutions to multifactorial problems;determine multiple solutions to problems;80%;with minimal cues (75-90%)  -RS -- determine solutions to multifactorial problems;determine multiple solutions to problems;80%;with minimal cues (75-90%)  -    Time Frame (Problem Solving  Goal 1, SLP) short term goal (STG)  -RS -- short term goal (STG)  -SM    Progress/Outcomes (Problem Solving Goal 1, SLP) goal ongoing  -RS -- goal ongoing  -SM       Pragmatic Skills Goal 1 (SLP)    Improve Pragmatic Skills Goal 1 (SLP) -- -- maintain eye contact;100%;independently (over 90% accuracy)  -SM    Time Frame (Pragmatic Skills Goal 1, SLP) -- -- short term goal (STG)  -SM    Progress/Outcomes (Pragmatic Skills Goal 1, SLP) -- -- goal met  -SM       Executive Functional Skills Goal 1 (SLP)    Improve Executive Function Skills Goal 1 (SLP) demonstrate awareness of deficit;identify strategies, strengths, limitations;exhibit cognitive flexibility;80%;with moderate cues (50-74%)  -RS -- demonstrate awareness of deficit;identify strategies, strengths, limitations;exhibit cognitive flexibility;80%;with moderate cues (50-74%)  -SM    Time Frame (Executive Function Skills Goal 1, SLP) short term goal (STG)  -RS -- short term goal (STG)  -SM    Progress (Executive Function Skills Goal 1, SLP) 30%;with minimal cues (75-90%)  -RS -- 30%;with minimal cues (75-90%)  -SM    Progress/Outcomes (Executive Function Skills Goal 1, SLP) continuing progress toward goal  -RS -- continuing progress toward goal  -SM       Right Hemisphere Function Goal 1 (SLP)    Improve Right Hemisphere Function Through Goal 1 (SLP) demonstrate awareness of communication partner in left visual field;perform self-monitoring;perform self-correction;80%;with minimal cues (75-90%)  -RS -- demonstrate awareness of communication partner in left visual field;perform self-monitoring;perform self-correction;80%;with minimal cues (75-90%)  -SM    Time Frame (Right Hemisphere Function Goal 1, SLP) short term goal (STG)  -RS -- short term goal (STG)  -SM    Progress/Outcomes (Right Hemisphere Function Goal 1, SLP) goal ongoing  -RS -- new goal;continuing progress toward goal  -SM    Comment (Right Hemisphere Function Goal 1, SLP) -- -- With cueing,  attending to communication partner on L  -SM              User Key  (r) = Recorded By, (t) = Taken By, (c) = Cosigned By      Initials Name Provider Type    Tyra Hope MS CCC-SLP Speech and Language Pathologist    Michael Motley MS CCC-SLP Speech and Language Pathologist                       Time Calculation:    Time Calculation- SLP       Row Name 01/05/24 1523             Time Calculation- SLP    SLP Start Time 1500  -RS      SLP Received On 01/05/24  -RS         Untimed Charges    03667-JR Treatment/ST Modification Prosth Aug Alter  26  -RS      [id number] Treatment Swallow Minutes 28  -RS         Total Minutes    Untimed Charges Total Minutes 54  -RS       Total Minutes 54  -RS                User Key  (r) = Recorded By, (t) = Taken By, (c) = Cosigned By      Initials Name Provider Type    Michael Motley MS CCC-SLP Speech and Language Pathologist                    Therapy Charges for Today       Code Description Service Date Service Provider Modifiers Qty    22265460376 HC ST TREATMENT SPEECH 2 1/5/2024 Michael Spencer MS CCC-SLP GN 1    [id number] HC ST TREATMENT SWALLOW 2 1/5/2024 Michael Spencer MS CCC-VENKATA GN 1                 MS SAEED [place]  1/5/2024

## 2024-01-06 LAB
QT INTERVAL: 438 MS
QTC INTERVAL: 469 MS

## 2024-01-06 PROCEDURE — 25010000002 HYDRALAZINE PER 20 MG: Performed by: INTERNAL MEDICINE

## 2024-01-06 PROCEDURE — 25810000003 SODIUM CHLORIDE 0.9 % SOLUTION 250 ML FLEX CONT: Performed by: INTERNAL MEDICINE

## 2024-01-06 PROCEDURE — 25010000002 NICARDIPINE 2.5 MG/ML SOLUTION 10 ML VIAL: Performed by: INTERNAL MEDICINE

## 2024-01-06 PROCEDURE — 99232 SBSQ HOSP IP/OBS MODERATE 35: CPT | Performed by: INTERNAL MEDICINE

## 2024-01-06 RX ORDER — TEMAZEPAM 15 MG/1
15 CAPSULE ORAL NIGHTLY PRN
Status: DISCONTINUED | OUTPATIENT
Start: 2024-01-06 | End: 2024-01-07

## 2024-01-06 RX ADMIN — NICARDIPINE HYDROCHLORIDE 7.5 MG/HR: 25 INJECTION, SOLUTION INTRAVENOUS at 00:47

## 2024-01-06 RX ADMIN — ACETAMINOPHEN 650 MG: 650 SOLUTION ORAL at 08:08

## 2024-01-06 RX ADMIN — NICARDIPINE HYDROCHLORIDE 7.5 MG/HR: 25 INJECTION, SOLUTION INTRAVENOUS at 00:49

## 2024-01-06 RX ADMIN — Medication 10 ML: at 08:09

## 2024-01-06 RX ADMIN — HYDRALAZINE HYDROCHLORIDE 10 MG: 20 INJECTION INTRAMUSCULAR; INTRAVENOUS at 02:29

## 2024-01-06 RX ADMIN — Medication 30 ML: at 20:11

## 2024-01-06 RX ADMIN — ACETAMINOPHEN 650 MG: 650 SOLUTION ORAL at 20:06

## 2024-01-06 RX ADMIN — HYDROXYZINE HYDROCHLORIDE 50 MG: 25 TABLET, FILM COATED ORAL at 20:06

## 2024-01-06 RX ADMIN — TEMAZEPAM 15 MG: 15 CAPSULE ORAL at 20:07

## 2024-01-06 RX ADMIN — LISINOPRIL 20 MG: 20 TABLET ORAL at 20:07

## 2024-01-06 RX ADMIN — METOPROLOL TARTRATE 50 MG: 50 TABLET ORAL at 20:06

## 2024-01-06 RX ADMIN — THIAMINE HCL TAB 100 MG 100 MG: 100 TAB at 08:10

## 2024-01-06 RX ADMIN — NICARDIPINE HYDROCHLORIDE 10 MG/HR: 25 INJECTION, SOLUTION INTRAVENOUS at 19:01

## 2024-01-06 RX ADMIN — Medication 1 TABLET: at 08:11

## 2024-01-06 RX ADMIN — NICARDIPINE HYDROCHLORIDE 5 MG/HR: 25 INJECTION, SOLUTION INTRAVENOUS at 16:03

## 2024-01-06 RX ADMIN — HYDRALAZINE HYDROCHLORIDE 10 MG: 20 INJECTION INTRAMUSCULAR; INTRAVENOUS at 15:14

## 2024-01-06 RX ADMIN — Medication 1 PATCH: at 08:11

## 2024-01-06 RX ADMIN — Medication 10 ML: at 20:11

## 2024-01-06 RX ADMIN — AMLODIPINE BESYLATE 10 MG: 10 TABLET ORAL at 08:09

## 2024-01-06 RX ADMIN — LISINOPRIL 20 MG: 20 TABLET ORAL at 08:09

## 2024-01-06 RX ADMIN — NICARDIPINE HYDROCHLORIDE 10 MG/HR: 25 INJECTION, SOLUTION INTRAVENOUS at 08:08

## 2024-01-06 RX ADMIN — METOPROLOL TARTRATE 50 MG: 50 TABLET ORAL at 08:10

## 2024-01-06 NOTE — PROGRESS NOTES
[facility] Medicine Services  PROGRESS NOTE    Patient Name: Chano Rees  : 1970  MRN: [id number]    Date of Admission: 2023  Primary Care Physician: Provider, No Known    Subjective   Subjective     CC:  ICH    HPI:  D/w father today, patient still having trouble sleeping.  Had a rough time last night going to sleep.  Was given melatonin and eventually went to sleep      Objective   Objective     Vital Signs:   Temp:  [98.3 °F (36.8 °C)-98.6 °F (37 °C)] 98.4 °F (36.9 °C)  Heart Rate:  [63-89] 63  Resp:  [18] 18  BP: (115-172)/() 126/84     Physical Exam:  Constitutional: No acute distress, awake, alert, sitting up in bed, father at bedside  HENT: NCAT, mucous membranes moist  Respiratory: Clear to auscultation bilaterally, respiratory effort normal   Cardiovascular: RRR, no murmurs, rubs, or gallops  Gastrointestinal: Positive bowel sounds, soft, nontender, nondistended  Musculoskeletal: No bilateral ankle edema  Psychiatric: Appropriate affect, cooperative  Neurologic: Oriented x 3 but mildly confused/naked in bed and not covered completely, LUE weakness, speech clear  Skin: No rashes      Results Reviewed:  LAB RESULTS:      Lab 24  0334 24  0425 24  0335 24  0530 24  0459   WBC 13.09* 13.24* 11.07* 11.33* 12.08*   HEMOGLOBIN 15.3 15.5 14.4 14.7 14.5   HEMATOCRIT 47.5 47.5 45.0 43.7 44.1   PLATELETS 291 271 227 201 232   NEUTROS ABS 9.53* 9.05* 7.55* 7.11* 8.41*   IMMATURE GRANS (ABS) 0.25* 0.25* 0.20* 0.39* 0.24*   LYMPHS ABS 1.62 1.95 1.34 1.56 1.28   MONOS ABS 1.33* 1.47* 1.41* 1.80* 1.81*   EOS ABS 0.27 0.43* 0.48* 0.40 0.27   MCV 90.8 92.1 92.6 91.6 91.1   CRP  --   --   --   --  8.97*   PROCALCITONIN  --   --  0.21  --   --          Lab 24  0334 24  0425 24  0335 24  0530 24  1756 24  0459   SODIUM 136 138 140 141  --  139   POTASSIUM 4.4 4.5 4.5 4.6 4.1 3.4*   CHLORIDE 102 104 107 107  --   107   CO2 22.0 23.0 22.0 19.0*  --  20.0*   ANION GAP 12.0 11.0 11.0 15.0  --  12.0   BUN 24* 26* 30* 30*  --  28*   CREATININE 1.21 1.17 0.97 1.15  --  1.18   EGFR 71.6 74.5 93.3 76.1  --  73.8   GLUCOSE 111* 107* 108* 103*  --  97   CALCIUM 9.3 9.4 9.0 9.1  --  8.6   MAGNESIUM 2.5  --  2.3  --   --  2.6   PHOSPHORUS  --   --  3.4  --   --  2.7         Lab 01/03/24  0335 01/01/24  0459   TOTAL PROTEIN 6.7 6.4   ALBUMIN 3.3* 3.2*   GLOBULIN 3.4 3.2   ALT (SGPT) 32 23   AST (SGOT) 24 26   BILIRUBIN 0.4 0.8   ALK PHOS 61 66             Lab 01/01/24  0459   CHOLESTEROL 156   TRIGLYCERIDES 110             Lab 01/01/24  0530   PH, ARTERIAL 7.425   PCO2, ARTERIAL 32.7*   PO2 ART 80.0*   FIO2 50   HCO3 ART 21.5   BASE EXCESS ART -2.1*   CARBOXYHEMOGLOBIN 1.3     Brief Urine Lab Results  (Last result in the past 365 days)        Color   Clarity   Blood   Leuk Est   Nitrite   Protein   CREAT   Urine HCG        12/29/23 2057 Yellow   Clear   Negative   Trace   Negative   Negative                   Microbiology Results Abnormal       Procedure Component Value - Date/Time    Blood Culture - Blood, Arm, Left [[id number]]  (Normal) Collected: 12/29/23 2100    Lab Status: Final result Specimen: Blood from Arm, Left Updated: 01/03/24 2146     Blood Culture No growth at 5 days    Blood Culture - Blood, Hand, Left [[id number]]  (Normal) Collected: 12/29/23 2106    Lab Status: Final result Specimen: Blood from Hand, Left Updated: 01/03/24 2146     Blood Culture No growth at 5 days            No radiology results from the last 24 hrs    Results for orders placed during the hospital encounter of 12/27/23    Adult Transthoracic Echo Complete W/ Cont if Necessary Per Protocol (With Agitated Saline)    Interpretation Summary    Left ventricular systolic function is normal. Calculated left ventricular EF = 55.6%    Left ventricular diastolic function is consistent with (grade I) impaired relaxation.    Saline test results are  negative.      Current medications:  Scheduled Meds:amLODIPine, 10 mg, Oral, Q24H  lisinopril, 20 mg, Oral, Q12H  metoprolol tartrate, 50 mg, Oral, Q12H  multivitamin pediatric chewable, 1 tablet, Oral, Daily  nicotine, 1 patch, Transdermal, Q24H  ProSource No Carb, 30 mL, Oral, BID  sodium chloride, 10 mL, Intravenous, Q12H  thiamine, 100 mg, Oral, Daily      Continuous Infusions:niCARdipine, 5-15 mg/hr  niCARdipine, 5-15 mg/hr, Last Rate: 2.5 mg/hr (01/06/24 1006)      PRN Meds:.  acetaminophen **OR** acetaminophen    Calcium Replacement - Follow Nurse / BPA Driven Protocol    dextrose    glucagon (human recombinant)    hydrALAZINE    hydrOXYzine    labetalol    Magnesium Standard Dose Replacement - Follow Nurse / BPA Driven Protocol    melatonin    Phosphorus Replacement - Follow Nurse / BPA Driven Protocol    Potassium Replacement - Follow Nurse / BPA Driven Protocol    sodium chloride    sodium chloride    Assessment & Plan   Assessment & Plan     Active Hospital Problems    Diagnosis  POA    **Rt Thalamic ICH [I61.9]  Yes    Hypertension [I10]  Yes    Non-compliance [Z91.199]  Not Applicable    History of alcohol abuse [F10.11]  Yes      Resolved Hospital Problems   No resolved problems to display.        Brief Hospital Course to date:  Chano Rees is a 53 y.o. male with h/o HTN and poor medical compliance presented with sudden onset left sided weakness and headache with elevated BP.  CT showed 3cm right thalamic ICH felt to be d/t hypertensive bleed as CTA was unremarkable and was monitored initially in the ICU.  He had issues with encephalopathy and was treated with precedex infusion    Right Thalamic ICH  --CT showed 3cm right thalamic ICH  --CTA unremarkable  --felt to be d/t hypertensive bleed    HTN  --norvasc increased.  Continue lisinopril, metoprolol  --BP much better today, trying to wean off cardene gtt    H/o etoh abuse on chart BUT now patient and father both adamantly denying etoh  use.  --s/p precedex gtt while in ICU  --continue thiamine, folate, MTV    Possible Aspiration Pna  --s/p zosyn    Insomnia  --patient with prolonged QT.  Not much help with hydroxyzine PRN.   --start restoril prn    Tobacco Abuse  --advise cessation, continue nicotine patch    D/w patient's father at bedside on 1/6/24    Expected Discharge Location and Transportation: awaiting rehab  Expected Discharge   Expected Discharge Date: 1/8/2024; Expected Discharge Time:      DVT prophylaxis:  Mechanical DVT prophylaxis orders are present.     AM-PAC 6 Clicks Score (PT): 10 (01/04/24 2000)    CODE STATUS:   Code Status and Medical Interventions:   Ordered at: 12/27/23 5038     Code Status (Patient has no pulse and is not breathing):    CPR (Attempt to Resuscitate)     Medical Interventions (Patient has pulse or is breathing):    Full Support       Ward Babb MD  01/06/24

## 2024-01-07 PROCEDURE — 99232 SBSQ HOSP IP/OBS MODERATE 35: CPT | Performed by: INTERNAL MEDICINE

## 2024-01-07 RX ORDER — HYDRALAZINE HYDROCHLORIDE 10 MG/1
10 TABLET, FILM COATED ORAL EVERY 12 HOURS SCHEDULED
Status: DISCONTINUED | OUTPATIENT
Start: 2024-01-07 | End: 2024-01-08 | Stop reason: HOSPADM

## 2024-01-07 RX ORDER — ACETAMINOPHEN 160 MG/5ML
650 SOLUTION ORAL EVERY 4 HOURS PRN
Status: DISCONTINUED | OUTPATIENT
Start: 2024-01-07 | End: 2024-01-08 | Stop reason: HOSPADM

## 2024-01-07 RX ORDER — ACETAMINOPHEN 650 MG/1
650 SUPPOSITORY RECTAL EVERY 4 HOURS PRN
Status: DISCONTINUED | OUTPATIENT
Start: 2024-01-07 | End: 2024-01-08 | Stop reason: HOSPADM

## 2024-01-07 RX ORDER — ACETAMINOPHEN 325 MG/1
650 TABLET ORAL EVERY 4 HOURS PRN
Status: DISCONTINUED | OUTPATIENT
Start: 2024-01-07 | End: 2024-01-08 | Stop reason: HOSPADM

## 2024-01-07 RX ORDER — TEMAZEPAM 15 MG/1
30 CAPSULE ORAL NIGHTLY PRN
Status: DISCONTINUED | OUTPATIENT
Start: 2024-01-07 | End: 2024-01-08 | Stop reason: HOSPADM

## 2024-01-07 RX ADMIN — Medication 10 MG: at 20:00

## 2024-01-07 RX ADMIN — Medication 30 ML: at 20:02

## 2024-01-07 RX ADMIN — Medication 10 MG: at 00:29

## 2024-01-07 RX ADMIN — HYDRALAZINE HYDROCHLORIDE 10 MG: 10 TABLET, FILM COATED ORAL at 20:01

## 2024-01-07 RX ADMIN — AMLODIPINE BESYLATE 10 MG: 10 TABLET ORAL at 10:04

## 2024-01-07 RX ADMIN — Medication 10 ML: at 20:01

## 2024-01-07 RX ADMIN — LISINOPRIL 20 MG: 20 TABLET ORAL at 20:01

## 2024-01-07 RX ADMIN — Medication 1 PATCH: at 10:05

## 2024-01-07 RX ADMIN — HYDRALAZINE HYDROCHLORIDE 10 MG: 10 TABLET, FILM COATED ORAL at 13:35

## 2024-01-07 RX ADMIN — Medication 10 ML: at 10:05

## 2024-01-07 RX ADMIN — METOPROLOL TARTRATE 50 MG: 50 TABLET ORAL at 20:01

## 2024-01-07 RX ADMIN — METOPROLOL TARTRATE 50 MG: 50 TABLET ORAL at 10:04

## 2024-01-07 RX ADMIN — LISINOPRIL 20 MG: 20 TABLET ORAL at 10:04

## 2024-01-07 RX ADMIN — HYDROXYZINE HYDROCHLORIDE 50 MG: 25 TABLET, FILM COATED ORAL at 20:00

## 2024-01-07 RX ADMIN — Medication 1 TABLET: at 13:31

## 2024-01-07 RX ADMIN — THIAMINE HCL TAB 100 MG 100 MG: 100 TAB at 10:04

## 2024-01-07 RX ADMIN — ACETAMINOPHEN 650 MG: 325 TABLET ORAL at 13:42

## 2024-01-07 NOTE — PROGRESS NOTES
Psychiatric Medicine Services  PROGRESS NOTE    Patient Name: Chano Rees  : 1970  MRN: [id number]    Date of Admission: 2023  Primary Care Physician: Provider, No Known    Subjective   Subjective     CC:  ICH    HPI:  States that he feels good.  Reports that still not able to sleep because his long hair is so knotted that pulls his hair anytime he moves his head.      Objective   Objective     Vital Signs:   Temp:  [97.6 °F (36.4 °C)-98.2 °F (36.8 °C)] 97.6 °F (36.4 °C)  Heart Rate:  [68-90] 71  Resp:  [16-18] 16  BP: (123-157)/() 145/88     Physical Exam:  Constitutional: No acute distress, awake, alert, sitting up in bed  HENT: NCAT, mucous membranes moist  Respiratory: Clear to auscultation bilaterally, respiratory effort normal   Cardiovascular: RRR, no murmurs, rubs, or gallops  Gastrointestinal: Positive bowel sounds, soft, nontender, nondistended  Musculoskeletal: No bilateral ankle edema  Psychiatric: Appropriate affect, cooperative  Neurologic: Oriented x 3, LUE weakness, speech clear  Skin: No rashes      Results Reviewed:  LAB RESULTS:      Lab 24  0334 24  0425 24  0335 24  0530 24  0459   WBC 13.09* 13.24* 11.07* 11.33* 12.08*   HEMOGLOBIN 15.3 15.5 14.4 14.7 14.5   HEMATOCRIT 47.5 47.5 45.0 43.7 44.1   PLATELETS 291 271 227 201 232   NEUTROS ABS 9.53* 9.05* 7.55* 7.11* 8.41*   IMMATURE GRANS (ABS) 0.25* 0.25* 0.20* 0.39* 0.24*   LYMPHS ABS 1.62 1.95 1.34 1.56 1.28   MONOS ABS 1.33* 1.47* 1.41* 1.80* 1.81*   EOS ABS 0.27 0.43* 0.48* 0.40 0.27   MCV 90.8 92.1 92.6 91.6 91.1   CRP  --   --   --   --  8.97*   PROCALCITONIN  --   --  0.21  --   --          Lab 24  0334 24  0425 245 24  1756 24  0459   SODIUM 136 138 140 141  --  139   POTASSIUM 4.4 4.5 4.5 4.6 4.1 3.4*   CHLORIDE 102 104 107 107  --  107   CO2 22.0 23.0 22.0 19.0*  --  20.0*   ANION GAP 12.0 11.0 11.0 15.0  --   12.0   BUN 24* 26* 30* 30*  --  28*   CREATININE 1.21 1.17 0.97 1.15  --  1.18   EGFR 71.6 74.5 93.3 76.1  --  73.8   GLUCOSE 111* 107* 108* 103*  --  97   CALCIUM 9.3 9.4 9.0 9.1  --  8.6   MAGNESIUM 2.5  --  2.3  --   --  2.6   PHOSPHORUS  --   --  3.4  --   --  2.7         Lab 01/03/24  0335 01/01/24  0459   TOTAL PROTEIN 6.7 6.4   ALBUMIN 3.3* 3.2*   GLOBULIN 3.4 3.2   ALT (SGPT) 32 23   AST (SGOT) 24 26   BILIRUBIN 0.4 0.8   ALK PHOS 61 66             Lab 01/01/24  0459   CHOLESTEROL 156   TRIGLYCERIDES 110             Lab 01/01/24  0530   PH, ARTERIAL 7.425   PCO2, ARTERIAL 32.7*   PO2 ART 80.0*   FIO2 50   HCO3 ART 21.5   BASE EXCESS ART -2.1*   CARBOXYHEMOGLOBIN 1.3     Brief Urine Lab Results  (Last result in the past 365 days)        Color   Clarity   Blood   Leuk Est   Nitrite   Protein   CREAT   Urine HCG        12/29/23 2057 Yellow   Clear   Negative   Trace   Negative   Negative                   Microbiology Results Abnormal       Procedure Component Value - Date/Time    Blood Culture - Blood, Arm, Left [[id number]]  (Normal) Collected: 12/29/23 2100    Lab Status: Final result Specimen: Blood from Arm, Left Updated: 01/03/24 2146     Blood Culture No growth at 5 days    Blood Culture - Blood, Hand, Left [[id number]]  (Normal) Collected: 12/29/23 2106    Lab Status: Final result Specimen: Blood from Hand, Left Updated: 01/03/24 2146     Blood Culture No growth at 5 days            No radiology results from the last 24 hrs    Results for orders placed during the hospital encounter of 12/27/23    Adult Transthoracic Echo Complete W/ Cont if Necessary Per Protocol (With Agitated Saline)    Interpretation Summary    Left ventricular systolic function is normal. Calculated left ventricular EF = 55.6%    Left ventricular diastolic function is consistent with (grade I) impaired relaxation.    Saline test results are negative.      Current medications:  Scheduled Meds:amLODIPine, 10 mg, Oral, Q24H  lisinopril,  20 mg, Oral, Q12H  metoprolol tartrate, 50 mg, Oral, Q12H  multivitamin pediatric chewable, 1 tablet, Oral, Daily  nicotine, 1 patch, Transdermal, Q24H  ProSource No Carb, 30 mL, Oral, BID  sodium chloride, 10 mL, Intravenous, Q12H  thiamine, 100 mg, Oral, Daily      Continuous Infusions:niCARdipine, 5-15 mg/hr, Last Rate: Stopped (01/07/24 0959)      PRN Meds:.  acetaminophen **OR** acetaminophen    Calcium Replacement - Follow Nurse / BPA Driven Protocol    dextrose    glucagon (human recombinant)    hydrALAZINE    hydrOXYzine    labetalol    Magnesium Standard Dose Replacement - Follow Nurse / BPA Driven Protocol    melatonin    Phosphorus Replacement - Follow Nurse / BPA Driven Protocol    Potassium Replacement - Follow Nurse / BPA Driven Protocol    sodium chloride    sodium chloride    temazepam    Assessment & Plan   Assessment & Plan     Active Hospital Problems    Diagnosis  POA    **Rt Thalamic ICH [I61.9]  Yes    Hypertension [I10]  Yes    Non-compliance [Z91.199]  Not Applicable    History of alcohol abuse [F10.11]  Yes      Resolved Hospital Problems   No resolved problems to display.        Brief Hospital Course to date:  Chano Rees is a 53 y.o. male with h/o HTN and poor medical compliance presented with sudden onset left sided weakness and headache with elevated BP.  CT showed 3cm right thalamic ICH felt to be d/t hypertensive bleed as CTA was unremarkable and was monitored initially in the ICU.  He had issues with encephalopathy and was treated with precedex infusion    Right Thalamic ICH  --CT showed 3cm right thalamic ICH  --CTA unremarkable  --felt to be d/t hypertensive bleed    HTN  --norvasc increased.  Continue lisinopril, metoprolol  --add hydralazine 10mg BID, trying to wean off cardene gtt    H/o etoh abuse on chart BUT now patient and father both adamantly denying etoh use.  --s/p precedex gtt while in ICU  --continue thiamine, folate, MTV    Possible Aspiration Pna  --s/p  zosyn    Insomnia  --patient with prolonged QT.  Not much help with hydroxyzine PRN.   --restoril prn    Tobacco Abuse  --advise cessation, continue nicotine patch    D/w patient's father at bedside on 1/6/24    Expected Discharge Location and Transportation: awaiting rehab  Expected Discharge   Expected Discharge Date: 1/8/2024; Expected Discharge Time:      DVT prophylaxis:  Mechanical DVT prophylaxis orders are present.     AM-PAC 6 Clicks Score (PT): 10 (01/06/24 2006)    CODE STATUS:   Code Status and Medical Interventions:   Ordered at: 12/27/23 0632     Code Status (Patient has no pulse and is not breathing):    CPR (Attempt to Resuscitate)     Medical Interventions (Patient has pulse or is breathing):    Full Support       Ward Babb MD  01/07/24

## 2024-01-08 ENCOUNTER — TELEPHONE (OUTPATIENT)
Dept: NEUROLOGY | Facility: CLINIC | Age: 54
End: 2024-01-08
Payer: COMMERCIAL

## 2024-01-08 VITALS
TEMPERATURE: 98.3 F | BODY MASS INDEX: 34.64 KG/M2 | OXYGEN SATURATION: 98 % | HEIGHT: 71 IN | DIASTOLIC BLOOD PRESSURE: 91 MMHG | WEIGHT: 247.4 LBS | SYSTOLIC BLOOD PRESSURE: 124 MMHG | RESPIRATION RATE: 18 BRPM | HEART RATE: 67 BPM

## 2024-01-08 PROCEDURE — 99239 HOSP IP/OBS DSCHRG MGMT >30: CPT | Performed by: INTERNAL MEDICINE

## 2024-01-08 RX ORDER — NICOTINE 21 MG/24HR
1 PATCH, TRANSDERMAL 24 HOURS TRANSDERMAL
Start: 2024-01-09

## 2024-01-08 RX ORDER — TEMAZEPAM 30 MG/1
30 CAPSULE ORAL NIGHTLY PRN
Start: 2024-01-08 | End: 2024-01-13

## 2024-01-08 RX ORDER — LANOLIN ALCOHOL/MO/W.PET/CERES
100 CREAM (GRAM) TOPICAL DAILY
Start: 2024-01-09

## 2024-01-08 RX ORDER — METOPROLOL TARTRATE 50 MG/1
50 TABLET, FILM COATED ORAL EVERY 12 HOURS SCHEDULED
Start: 2024-01-08

## 2024-01-08 RX ORDER — AMLODIPINE BESYLATE 10 MG/1
10 TABLET ORAL
Start: 2024-01-09

## 2024-01-08 RX ORDER — HYDRALAZINE HYDROCHLORIDE 10 MG/1
10 TABLET, FILM COATED ORAL EVERY 12 HOURS SCHEDULED
Start: 2024-01-08

## 2024-01-08 RX ORDER — LISINOPRIL 20 MG/1
40 TABLET ORAL DAILY
Start: 2024-01-08

## 2024-01-08 RX ADMIN — Medication 10 ML: at 08:32

## 2024-01-08 RX ADMIN — Medication 1 PATCH: at 08:33

## 2024-01-08 RX ADMIN — AMLODIPINE BESYLATE 10 MG: 10 TABLET ORAL at 08:31

## 2024-01-08 RX ADMIN — ACETAMINOPHEN 650 MG: 325 TABLET ORAL at 11:50

## 2024-01-08 RX ADMIN — THIAMINE HCL TAB 100 MG 100 MG: 100 TAB at 08:31

## 2024-01-08 RX ADMIN — Medication 1 TABLET: at 08:30

## 2024-01-08 RX ADMIN — Medication 30 ML: at 08:37

## 2024-01-08 RX ADMIN — LISINOPRIL 20 MG: 20 TABLET ORAL at 08:30

## 2024-01-08 RX ADMIN — HYDRALAZINE HYDROCHLORIDE 10 MG: 10 TABLET, FILM COATED ORAL at 08:31

## 2024-01-08 RX ADMIN — METOPROLOL TARTRATE 50 MG: 50 TABLET ORAL at 08:31

## 2024-01-08 NOTE — PROGRESS NOTES
[facility] Medicine Services  PROGRESS NOTE    Patient Name: Chano Rees  : 1970  MRN: [id number]    Date of Admission: 2023  Primary Care Physician: Provider, No Known    Subjective   Subjective     CC:  ICH    HPI:  States that didn't sleep again last night but wasn't given restoril that was ordered.  Girlfriend at bedside.       Objective   Objective     Vital Signs:   Temp:  [98 °F (36.7 °C)-98.4 °F (36.9 °C)] 98.1 °F (36.7 °C)  Heart Rate:  [63-80] 74  Resp:  [16-18] 18  BP: (123-177)/() 135/89     Physical Exam:  Constitutional: No acute distress, sleeping but arouses easily, girlfriend at bedside  HENT: NCAT, mucous membranes moist  Respiratory: Clear to auscultation bilaterally, respiratory effort normal   Cardiovascular: RRR, no murmurs, rubs, or gallops  Gastrointestinal: Positive bowel sounds, soft, nontender, nondistended  Musculoskeletal: No bilateral ankle edema  Psychiatric: Appropriate affect, cooperative  Neurologic: Oriented x 3, LUE weakness, speech clear  Skin: No rashes      Results Reviewed:  LAB RESULTS:      Lab 24  03324  0530   WBC 13.09* 13.24* 11.07* 11.33*   HEMOGLOBIN 15.3 15.5 14.4 14.7   HEMATOCRIT 47.5 47.5 45.0 43.7   PLATELETS 291 271 227 201   NEUTROS ABS 9.53* 9.05* 7.55* 7.11*   IMMATURE GRANS (ABS) 0.25* 0.25* 0.20* 0.39*   LYMPHS ABS 1.62 1.95 1.34 1.56   MONOS ABS 1.33* 1.47* 1.41* 1.80*   EOS ABS 0.27 0.43* 0.48* 0.40   MCV 90.8 92.1 92.6 91.6   PROCALCITONIN  --   --  0.21  --          Lab 24  0334 24  0425 24  0335 24  0530 24  1756   SODIUM 136 138 140 141  --    POTASSIUM 4.4 4.5 4.5 4.6 4.1   CHLORIDE 102 104 107 107  --    CO2 22.0 23.0 22.0 19.0*  --    ANION GAP 12.0 11.0 11.0 15.0  --    BUN 24* 26* 30* 30*  --    CREATININE 1.21 1.17 0.97 1.15  --    EGFR 71.6 74.5 93.3 76.1  --    GLUCOSE 111* 107* 108* 103*  --    CALCIUM 9.3 9.4 9.0 9.1   --    MAGNESIUM 2.5  --  2.3  --   --    PHOSPHORUS  --   --  3.4  --   --          Lab 01/03/24  0335   TOTAL PROTEIN 6.7   ALBUMIN 3.3*   GLOBULIN 3.4   ALT (SGPT) 32   AST (SGOT) 24   BILIRUBIN 0.4   ALK PHOS 61                         Brief Urine Lab Results  (Last result in the past 365 days)        Color   Clarity   Blood   Leuk Est   Nitrite   Protein   CREAT   Urine HCG        12/29/23 2057 Yellow   Clear   Negative   Trace   Negative   Negative                   Microbiology Results Abnormal       Procedure Component Value - Date/Time    Blood Culture - Blood, Arm, Left [[id number]]  (Normal) Collected: 12/29/23 2100    Lab Status: Final result Specimen: Blood from Arm, Left Updated: 01/03/24 2146     Blood Culture No growth at 5 days    Blood Culture - Blood, Hand, Left [[id number]]  (Normal) Collected: 12/29/23 2106    Lab Status: Final result Specimen: Blood from Hand, Left Updated: 01/03/24 2146     Blood Culture No growth at 5 days            No radiology results from the last 24 hrs    Results for orders placed during the hospital encounter of 12/27/23    Adult Transthoracic Echo Complete W/ Cont if Necessary Per Protocol (With Agitated Saline)    Interpretation Summary    Left ventricular systolic function is normal. Calculated left ventricular EF = 55.6%    Left ventricular diastolic function is consistent with (grade I) impaired relaxation.    Saline test results are negative.      Current medications:  Scheduled Meds:amLODIPine, 10 mg, Oral, Q24H  hydrALAZINE, 10 mg, Oral, Q12H  lisinopril, 20 mg, Oral, Q12H  metoprolol tartrate, 50 mg, Oral, Q12H  multivitamin pediatric chewable, 1 tablet, Oral, Daily  nicotine, 1 patch, Transdermal, Q24H  ProSource No Carb, 30 mL, Oral, BID  sodium chloride, 10 mL, Intravenous, Q12H  thiamine, 100 mg, Oral, Daily      Continuous Infusions:niCARdipine, 5-15 mg/hr, Last Rate: Stopped (01/07/24 0959)      PRN Meds:.  acetaminophen **OR** acetaminophen **OR**  acetaminophen    Calcium Replacement - Follow Nurse / BPA Driven Protocol    dextrose    glucagon (human recombinant)    hydrALAZINE    hydrOXYzine    labetalol    Magnesium Standard Dose Replacement - Follow Nurse / BPA Driven Protocol    melatonin    Phosphorus Replacement - Follow Nurse / BPA Driven Protocol    Potassium Replacement - Follow Nurse / BPA Driven Protocol    sodium chloride    sodium chloride    temazepam    Assessment & Plan   Assessment & Plan     Active Hospital Problems    Diagnosis  POA    **Rt Thalamic ICH [I61.9]  Yes    Hypertension [I10]  Yes    Non-compliance [Z91.199]  Not Applicable    History of alcohol abuse [F10.11]  Yes      Resolved Hospital Problems   No resolved problems to display.        Brief Hospital Course to date:  Chano Rees is a 53 y.o. male with h/o HTN and poor medical compliance presented with sudden onset left sided weakness and headache with elevated BP.  CT showed 3cm right thalamic ICH felt to be d/t hypertensive bleed as CTA was unremarkable and was monitored initially in the ICU.  He had issues with encephalopathy and was treated with precedex infusion    Right Thalamic ICH  --CT showed 3cm right thalamic ICH  --CTA unremarkable  --felt to be d/t hypertensive bleed    HTN  --norvasc increased.  Continue lisinopril, metoprolol  --added hydralazine 10mg BID,  off cardene gtt since 1/7    H/o etoh abuse on chart BUT now patient and father both adamantly denying etoh use.  --s/p precedex gtt while in ICU  --continue thiamine, folate, MTV    Possible Aspiration Pna  --s/p zosyn    Insomnia  --patient with prolonged QT.  Not much help with hydroxyzine PRN.   --restoril prn--increased dose to 30mg qhs prn but wasn't give last nigh    Tobacco Abuse  --advise cessation, continue nicotine patch    D/w patient's girlfriend at bedside on 1/8/24    Expected Discharge Location and Transportation: awaiting rehab  Expected Discharge   Expected Discharge Date: 1/9/2024;  Expected Discharge Time:      DVT prophylaxis:  Mechanical DVT prophylaxis orders are present.     AM-PAC 6 Clicks Score (PT): 9 (01/08/24 0830)    CODE STATUS:   Code Status and Medical Interventions:   Ordered at: 12/27/23 1946     Code Status (Patient has no pulse and is not breathing):    CPR (Attempt to Resuscitate)     Medical Interventions (Patient has pulse or is breathing):    Full Support       Ward Babb MD  01/08/24

## 2024-01-08 NOTE — CASE MANAGEMENT/SOCIAL WORK
Case Management Discharge Note      Final Note: Mr. Rees has a rehab bed at [facility] stroke unit today if medically ready. Confirmed with April with facility. Updated Mr. Rees and he is agreeable with discharge plan. He will be transported by [facility] at 3 pm. Please call report to [phone]. If unable to reach the nurse, call the house supervisor at [phone]. No need to fax the discharge summary.         Selected Continued Care - Admitted Since 12/27/2023       Destination Coordination complete.      Service Provider Selected Services Address Phone Fax Patient Preferred    [facility] [address] [facility] [id number] [phone] --                          Transportation Services  Ambulance:  ([facility])    Final Discharge Disposition Code: 62 - inpatient rehab facility

## 2024-01-08 NOTE — TELEPHONE ENCOUNTER
Caller: MIRIAM SOFIA    Best call back number:[phone]    New or established patient?  [x] New  [] Established    Date of discharge: 01/08/24    Facility discharged from:  [facility]    Diagnosis/Symptoms: AMS, left-sided weakness      Length of stay (If applicable): 11 DAYS    Specialty Only: Did you see a [facility] provider?    [x] Yes  [] No      ROSIE FELIX MD    PT WAS SEEN IN HOSPITAL AND NOTES STATE TO FOLLOW UP NOTES STATES TO FOLLOW UP WITH STROKE CLINIC. NO NOTE OF STROKE OR A TIME FRAME. OK TO SCHEDULE WITH STROKE CLINIC?    PLEASE REVIEW AND ADVISE.  THANK YOU

## 2024-01-08 NOTE — DISCHARGE SUMMARY
[facility] Medicine Services  DISCHARGE SUMMARY    Patient Name: Chano Rees  : 1970  MRN: [id number]    Date of Admission: 2023  9:01 PM  Date of Discharge:  2024  Primary Care Physician: Provider, No Known    Consults       Date and Time Order Name Status Description    2023  9:02 PM Inpatient Neurology Consult Stroke Completed             Hospital Course     Presenting Problem:     Active Hospital Problems    Diagnosis  POA    **Rt Thalamic ICH [I61.9]  Yes    Hypertension [I10]  Yes    Non-compliance [Z91.199]  Not Applicable    History of alcohol abuse [F10.11]  Yes      Resolved Hospital Problems   No resolved problems to display.          Hospital Course:  Chano Rees is a 53 y.o. male  with h/o HTN and poor medical compliance presented with sudden onset left sided weakness and headache with elevated BP.  CT showed 3cm right thalamic ICH felt to be d/t hypertensive bleed as CTA was unremarkable and was monitored initially in the ICU.  He had issues with encephalopathy and was treated with precedex infusion     Right Thalamic ICH  --CT showed 3cm right thalamic ICH.  Stable on repeat CT head last on 23  --CTA unremarkable  --felt to be d/t hypertensive bleed     HTN  --norvasc increased.  Continue lisinopril, metoprolol  --added hydralazine 10mg BID,  off cardene gtt since      H/o etoh abuse on chart BUT now patient and father both adamantly denying etoh use.  --s/p precedex gtt while in ICU  --continue thiamine, folate, MTV     Possible Aspiration Pna  --s/p zosyn    Dysphagia  --continue honey thick liquids.  May repeat MBS at [facility]     Insomnia  --patient with prolonged QT.  Not much help with hydroxyzine PRN.   --restoril prn     Tobacco Abuse  --advised cessation, continue nicotine patch      Discharge Follow Up Recommendations for outpatient labs/diagnostics:   F/u with PCP 1 week after discharged from rehab  F/u with NS with repeat CT  head to follow ICH  F/u with stroke neuro    Day of Discharge     HPI:   Still didn't sleep well last night but wasn't given restoril.  BP doing ok off cardene gtt since yesterday per nurse.      Review of Systems  Gen- No fevers, chills  CV- No chest pain, palpitations  Resp- No cough, dyspnea  GI- No N/V/D, abd pain      Vital Signs:   Temp:  [98 °F (36.7 °C)-98.4 °F (36.9 °C)] 98.1 °F (36.7 °C)  Heart Rate:  [63-80] 79  Resp:  [16-18] 18  BP: (123-177)/() 141/93      Physical Exam:  Constitutional: No acute distress, awake, alert, girlfriend at bedside  HENT: NCAT, mucous membranes moist  Respiratory: Clear to auscultation bilaterally, respiratory effort normal   Cardiovascular: RRR, no murmurs, rubs, or gallops  Gastrointestinal: Positive bowel sounds, soft, nontender, nondistended  Musculoskeletal: No bilateral ankle edema  Psychiatric: Appropriate affect, cooperative  Neurologic: Oriented x 3, LUE weakness,  speech clear  Skin: No rashes      Pertinent  and/or Most Recent Results     LAB RESULTS:      Lab 01/05/24  0334 01/04/24  0425 01/03/24 0335 01/02/24  0530   WBC 13.09* 13.24* 11.07* 11.33*   HEMOGLOBIN 15.3 15.5 14.4 14.7   HEMATOCRIT 47.5 47.5 45.0 43.7   PLATELETS 291 271 227 201   NEUTROS ABS 9.53* 9.05* 7.55* 7.11*   IMMATURE GRANS (ABS) 0.25* 0.25* 0.20* 0.39*   LYMPHS ABS 1.62 1.95 1.34 1.56   MONOS ABS 1.33* 1.47* 1.41* 1.80*   EOS ABS 0.27 0.43* 0.48* 0.40   MCV 90.8 92.1 92.6 91.6   PROCALCITONIN  --   --  0.21  --          Lab 01/05/24  0334 01/04/24  0425 01/03/24  0335 01/02/24  0530 01/01/24  1756   SODIUM 136 138 140 141  --    POTASSIUM 4.4 4.5 4.5 4.6 4.1   CHLORIDE 102 104 107 107  --    CO2 22.0 23.0 22.0 19.0*  --    ANION GAP 12.0 11.0 11.0 15.0  --    BUN 24* 26* 30* 30*  --    CREATININE 1.21 1.17 0.97 1.15  --    EGFR 71.6 74.5 93.3 76.1  --    GLUCOSE 111* 107* 108* 103*  --    CALCIUM 9.3 9.4 9.0 9.1  --    MAGNESIUM 2.5  --  2.3  --   --    PHOSPHORUS  --   --  3.4  --    --          Lab 01/03/24  0335   TOTAL PROTEIN 6.7   ALBUMIN 3.3*   GLOBULIN 3.4   ALT (SGPT) 32   AST (SGOT) 24   BILIRUBIN 0.4   ALK PHOS 61                     Brief Urine Lab Results  (Last result in the past 365 days)        Color   Clarity   Blood   Leuk Est   Nitrite   Protein   CREAT   Urine HCG        12/29/23 2057 Yellow   Clear   Negative   Trace   Negative   Negative                 Microbiology Results (last 10 days)       Procedure Component Value - Date/Time    Blood Culture - Blood, Hand, Left [[id number]]  (Normal) Collected: 12/29/23 2106    Lab Status: Final result Specimen: Blood from Hand, Left Updated: 01/03/24 2146     Blood Culture No growth at 5 days    Blood Culture - Blood, Arm, Left [[id number]]  (Normal) Collected: 12/29/23 2100    Lab Status: Final result Specimen: Blood from Arm, Left Updated: 01/03/24 2146     Blood Culture No growth at 5 days            SLP FEES - Fiberoptic Endo Eval Swallow    Result Date: 1/3/2024  This procedure was auto-finalized with no dictation required.    XR Chest 1 View    Result Date: 1/2/2024  XR CHEST 1 VW Date of Exam: 1/2/2024 4:02 AM EST Indication: Hypoxia Comparison: On 124 Findings: Stable cardiomegaly. Esophagogastric tube tip below the diaphragm. Right upper extremity PICC tip is at the cavoatrial junction. Lungs are hypoinflated but clear. No pneumothorax. No pleural effusion.     Impression: 1. Stable lines and support tubes. 2. Hypoinflated lungs without acute process. Electronically Signed: Nehemias Nixon MD  1/2/2024 7:22 AM EST  Workstation ID: [id number]    XR Chest 1 View    Result Date: 1/1/2024  XR CHEST 1 VW Date of Exam: 1/1/2024 4:13 AM EST Indication: Pulmonary edema Comparison: 12/31/2023 Findings: PICC line and feeding tube appear to be in satisfactory position. Heart is enlarged. Vasculature appears mildly cephalized. Mild coarsening of the pulmonary interstitial markings is stable. No lung consolidation, effusion or pneumothorax is  seen.     Impression: No new chest disease. Electronically Signed: Solomon Carbone MD  1/1/2024 8:47 AM EST  Workstation ID: [id number]    XR Chest 1 View    Result Date: 12/31/2023  XR CHEST 1 VW Date of Exam: 12/31/2023 3:10 AM EST Indication: Hypoxia Comparison: Chest x-ray 12/29/2023 Findings: Redemonstration of right upper extremity PICC line. Interval placement of enteric feeding tube which courses in the stomach with the tip beyond the field-of-view. Stable cardiomegaly. The left costophrenic angle is included from the field-of-view. There are vague interstitial opacities bilaterally similar to prior. No definite pleural effusion. No pneumothorax.     Impression: Placement of enteric feeding tube which courses into the stomach with the tip beyond the field-of-view. Vague bilateral interstitial opacities suspicious for atypical/viral infection. Electronically Signed: Alex Nieto MD  12/31/2023 8:05 AM EST  Workstation ID: [id number]    CT Head Without Contrast    Result Date: 12/31/2023  CT HEAD WO CONTRAST Date of Exam: 12/31/2023 5:35 AM EST Indication: Stroke, follow up. Comparison: 12/29/2023 Technique: Axial CT images were obtained of the head without contrast administration.  Automated exposure control and iterative construction methods were used. Findings: There is a stable intraparenchymal hemorrhage in the right basal ganglia. This measures up to 3.2 x 2.1 x 4.0 cm (approximately 13 cc) and extends into the right lateral ventricle. There is trace intraventricular hemorrhage, which is slightly decreased compared to the prior study. There is no new hemorrhage. There is edema surrounding the right basal ganglia hemorrhage with mild mass effect and 3 mm leftward shift of midline. There is no evidence of herniation or acute hydrocephalus. There is mild stable underlying chronic small vessel ischemic change. No new hypoattenuating lesions in the brain. There are mild intracranial vascular calcifications. There  is mild right maxillary sinus mucosal disease. The mastoids are clear. The calvarium is intact. Orbits are unremarkable. Superficial soft tissues appear within normal limits.     Impression: 1.Stable right basal ganglia intraparenchymal hemorrhage extending into the right lateral ventricle. 2.Stable mild mass effect with 3 mm leftward shift of midline. No herniation or acute hydrocephalus. 3.No new hemorrhage. 4.Mild chronic small vessel ischemic change. Electronically Signed: Tal Meier MD  12/31/2023 6:00 AM EST  Workstation ID: [id number]    XR Abdomen KUB    Result Date: 12/29/2023  XR ABDOMEN KUB Date of Exam: 12/29/2023 9:30 PM EST Indication: NG placement Comparison: None available. Findings: Weighted tip feeding catheter tip overlies the duodenal/jejunal junction. Visualized bowel gas pattern is nonobstructive.     Impression: Weighted tip feeding catheter tip overlies the duodenal/jejunal junction. Electronically Signed: Victor M Guevara MD  12/29/2023 10:34 PM EST  Workstation ID: [id number]    XR Chest 1 View    Result Date: 12/29/2023  XR CHEST 1 VW Date of Exam: 12/29/2023 8:21 PM EST Indication: fever Comparison: 12/27/2023 Findings: Cardiothymic silhouette appears enlarged as on the prior study. There is cephalization of the vasculature. Peribronchial thickening appears increased, and there is persistent, mildly increased patchy interstitial disease in the left lung base that may represent developing bronchitis or pneumonia. No dense lung consolidation, effusion or pneumothorax is seen.     Impression: 1. Mildly increased patchy left basilar interstitial changes, potentially a developing pneumonia. Increased peribronchial thickening potentially bronchitis. 2. Cardiomegaly and mild pulmonary venous hypertension. Electronically Signed: Solomon Carbone MD  12/29/2023 8:58 PM EST  Workstation ID: [id number]    CT Head Without Contrast    Result Date: 12/29/2023  CT HEAD WO CONTRAST Date of Exam: 12/29/2023 7:12 PM  EST Indication: Neuro deficit, acute, stroke suspected AMS, aphasia, f/u ICH. Comparison: Head CT same date timed 00:28. Technique: Axial CT images were obtained of the head without contrast administration.  Automated exposure control and iterative construction methods were used. Findings: Similar size and morphology of the hemorrhage centered within the right thalamus measuring around 3 cm in maximum dimension. Similar thin rim of surrounding edema. Similar 3 mm of midline shift. Similar trace blood products layering within the posterior horns of the lateral ventricles. No new or enlarging intracranial hemorrhage. No new mass effect. Paranasal sinuses and mastoid air cells are clear. No acute or suspicious soft tissue or osseous lesion.     Impression: No significant change with similar 3 cm right thalamic hemorrhage, 3 mm midline shift, and trace intraventricular blood products. Electronically Signed: Victor M Guevara MD  12/29/2023 7:50 PM EST  Workstation ID: [id number]    CT Head Without Contrast    Result Date: 12/29/2023  CT HEAD WO CONTRAST Date of Exam: 12/29/2023 12:22 AM EST Indication: Anisocoria Drowsiness. Comparison: CT scan of the head dated December 28, 2023 at 11:56 a.m. Technique: Axial CT images were obtained of the head without contrast administration.  Automated exposure control and iterative construction methods were used. Findings: There is been no significant change in the acute right thalamic hemorrhage. Maximum diameter is about 3 cm. Small amount of intraventricular hemorrhage is seen in the posterior horn the right lateral ventricle, unchanged. The degree of right to left shift is stable at about 3 mm. Iodinated contrast is again identified in the cerebral arteries. The paranasal sinuses are clear. There are no new areas of hemorrhage or abnormal extra-axial fluid collections.     Impression: Stable CT head. Stable 3 cm right thalamic hemorrhage with right to left shift of 3 mm.  Electronically Signed: Christoph Richardson MD  12/29/2023 1:12 AM EST  Workstation ID: [id number]    MRI Brain Without Contrast    Result Date: 12/28/2023  MRI BRAIN WO CONTRAST Date of Exam: 12/28/2023 10:06 PM EST Indication: Stroke, follow up.  Comparison: Same day head CT. Technique:  Routine multiplanar/multisequence sequence images of the brain were obtained without contrast administration. Please note this examination is significantly motion degraded. Findings: Within the confines of motion artifact as above, similar size of the right thalamic hemorrhage measuring around 3 cm in maximum dimension. Similar small volume layering blood products in the right lateral ventricle. Ventricular caliber is unchanged. Similar 2 to 3 mm of midline shift. No discrete evidence of restricted diffusion otherwise to suggest acute/subacute infarction.     Impression: Please note this examination is significantly motion degraded. Within these confines, there is overall similar size/volume of the right thalamic hemorrhage and small volume intraventricular blood products in the right ventricle. Similar 2-3 mm midline shift. No gross evidence of new acute intracranial process otherwise, though majority of sequences are nondiagnostic. Electronically Signed: Victor M Guevara MD  12/28/2023 11:19 PM EST  Workstation ID: [id number]    CT Head Without Contrast    Result Date: 12/28/2023  CT HEAD WO CONTRAST Date of Exam: 12/28/2023 11:55 AM EST Indication: Stroke, follow up. Comparison: 12/28/2023 Technique: Axial CT images were obtained of the head without contrast administration.  Automated exposure control and iterative construction methods were used. Findings: No significant change in right thalamic acute hemorrhage measuring 3 cm in longest diameter. There is redemonstration of a small amount of intraventricular hemorrhage within the posterior horn of the right lateral ventricle. Mild right to left midline shift is again identified of  approximately 2 to 3 mm. Residual contrast within the cerebral arteries. Orbits paranasal sinuses and mastoid air cells are unremarkable. Vascular calcifications are identified.     Impression: 1. No significant change in right thalamic hemorrhage measuring around 3 cm in diameter with mild right to left midline shift. Electronically Signed: Tj Tyson MD  12/28/2023 12:12 PM EST  Workstation ID: [id number]             Results for orders placed during the hospital encounter of 12/27/23    Adult Transthoracic Echo Complete W/ Cont if Necessary Per Protocol (With Agitated Saline)    Interpretation Summary    Left ventricular systolic function is normal. Calculated left ventricular EF = 55.6%    Left ventricular diastolic function is consistent with (grade I) impaired relaxation.    Saline test results are negative.      Plan for Follow-up of Pending Labs/Results:     Discharge Details        Discharge Medications        New Medications        Instructions Start Date   amLODIPine 10 MG tablet  Commonly known as: NORVASC   10 mg, Oral, Every 24 Hours Scheduled   Start Date: January 9, 2024     hydrALAZINE 10 MG tablet  Commonly known as: APRESOLINE   10 mg, Oral, Every 12 Hours Scheduled      lisinopril 20 MG tablet  Commonly known as: PRINIVIL,ZESTRIL   40 mg, Oral, Daily      metoprolol tartrate 50 MG tablet  Commonly known as: LOPRESSOR   50 mg, Oral, Every 12 Hours Scheduled      multivitamin pediatric chewable chewable tablet   1 tablet, Oral, Daily   Start Date: January 9, 2024     nicotine 21 MG/24HR patch  Commonly known as: NICODERM CQ   1 patch, Transdermal, Every 24 Hours Scheduled   Start Date: January 9, 2024     temazepam 30 MG capsule  Commonly known as: RESTORIL   30 mg, Oral, Nightly PRN      thiamine 100 MG tablet  Commonly known as: VITAMIN B1   100 mg, Oral, Daily   Start Date: January 9, 2024              Allergies   Allergen Reactions    Shellfish-Derived Products Hives         Discharge  Disposition:  Rehab Facility or Unit (DC - External)    Diet:  Hospital:  Diet Order   Procedures    Diet: Cardiac Diets; Healthy Heart (2-3 Na+); No Mixed Consistencies; Texture: Regular Texture (IDDSI 7); Fluid Consistency: Honey Thick            Activity:      Restrictions or Other Recommendations:         CODE STATUS:    Code Status and Medical Interventions:   Ordered at: 12/27/23 9401     Code Status (Patient has no pulse and is not breathing):    CPR (Attempt to Resuscitate)     Medical Interventions (Patient has pulse or is breathing):    Full Support       No future appointments.    Additional Instructions for the Follow-ups that You Need to Schedule       Discharge Follow-up with PCP   As directed       Currently Documented PCP:    Provider, No Known    PCP Phone Number:    None     Follow Up Details: with PCP 1 week after discharge from rehab.  Please have CM arrange PCP if patient doesn't have one        Discharge Follow-up with Specified Provider: with NS   As directed      To: with NS   Follow Up Details: with repeat CT to f/u ICH        Discharge Follow-up with Specified Provider: with stroke neuro; 2 Months   As directed      To: with stroke neuro   Follow Up: 2 Months                      Ward Babb MD  01/08/24      Time Spent on Discharge:  I spent  40  minutes on this discharge activity which included: face-to-face encounter with the patient, reviewing the data in the system, coordination of the care with the nursing staff as well as consultants, documentation, and entering orders.

## 2024-01-09 NOTE — PAYOR COMM NOTE
"2nd Request  23 Admission  24 Discharged    Utilization Review  Phone [phone]  Fax [phone]    [place] [address]       Chano Winkler (53 y.o. Male)       Date of Birth   1970    Social Security Number       Address   [address]  [place] [address]    Home Phone   [phone]    MRN   [id number]       Mormonism   None    Marital Status   Single                            Admission Date   23    Admission Type   Emergency    Admitting Provider   Ward Babb MD    Attending Provider       Department, Room/Bed   [facility] 3F, S301/1       Discharge Date   2024    Discharge Disposition   Rehab Facility or Unit (DC - External)    Discharge Destination                                 Attending Provider: (none)   Allergies: Shellfish-derived Products    Isolation: None   Infection: None   Code Status: Prior    Ht: 180.3 cm (71\")   Wt: 112 kg (247 lb 6.4 oz)    Admission Cmt: None   Principal Problem: Rt Thalamic ICH [I61.9]                   Active Insurance as of 2023       Primary Coverage       Payor Plan Insurance Group Employer/Plan Group    [employer] [place] EXCHANGE [employer] EXCHANGE [id number]       Payor Plan Address Payor Plan Phone Number Payor Plan Fax Number Effective Dates    [address] [phone]  2023 - None Entered    [facility] [id number]         Subscriber Name Subscriber Birth Date Member ID       CHANO WINKLER 1970 [id number]                     Emergency Contacts        (Rel.) Home Phone Work Phone Mobile Phone    Amrita Jones (Significant Other) -- -- [phone]    Eboni Winkler (Mother) -- -- [phone]                 Physician Progress Notes (last 5 days)        Ward Babb MD at 24 [facility]6              [facility] Medicine Services  PROGRESS NOTE    Patient Name: Chano Winkler  : " 1970  MRN: [id number]    Date of Admission: 12/27/2023  Primary Care Physician: Provider, No Known    Subjective   Subjective     CC:  ICH    HPI:  States that didn't sleep again last night but wasn't given restoril that was ordered.  Girlfriend at bedside.       Objective   Objective     Vital Signs:   Temp:  [98 °F (36.7 °C)-98.4 °F (36.9 °C)] 98.1 °F (36.7 °C)  Heart Rate:  [63-80] 74  Resp:  [16-18] 18  BP: (123-177)/() 135/89     Physical Exam:  Constitutional: No acute distress, sleeping but arouses easily, girlfriend at bedside  HENT: NCAT, mucous membranes moist  Respiratory: Clear to auscultation bilaterally, respiratory effort normal   Cardiovascular: RRR, no murmurs, rubs, or gallops  Gastrointestinal: Positive bowel sounds, soft, nontender, nondistended  Musculoskeletal: No bilateral ankle edema  Psychiatric: Appropriate affect, cooperative  Neurologic: Oriented x 3, LUE weakness, speech clear  Skin: No rashes      Results Reviewed:  LAB RESULTS:      Lab 01/05/24 0334 01/04/24 0425 01/03/24 0335 01/02/24  0530   WBC 13.09* 13.24* 11.07* 11.33*   HEMOGLOBIN 15.3 15.5 14.4 14.7   HEMATOCRIT 47.5 47.5 45.0 43.7   PLATELETS 291 271 227 201   NEUTROS ABS 9.53* 9.05* 7.55* 7.11*   IMMATURE GRANS (ABS) 0.25* 0.25* 0.20* 0.39*   LYMPHS ABS 1.62 1.95 1.34 1.56   MONOS ABS 1.33* 1.47* 1.41* 1.80*   EOS ABS 0.27 0.43* 0.48* 0.40   MCV 90.8 92.1 92.6 91.6   PROCALCITONIN  --   --  0.21  --          Lab 01/05/24 0334 01/04/24 0425 01/03/24  0335 01/02/24  0530 01/01/24  1756   SODIUM 136 138 140 141  --    POTASSIUM 4.4 4.5 4.5 4.6 4.1   CHLORIDE 102 104 107 107  --    CO2 22.0 23.0 22.0 19.0*  --    ANION GAP 12.0 11.0 11.0 15.0  --    BUN 24* 26* 30* 30*  --    CREATININE 1.21 1.17 0.97 1.15  --    EGFR 71.6 74.5 93.3 76.1  --    GLUCOSE 111* 107* 108* 103*  --    CALCIUM 9.3 9.4 9.0 9.1  --    MAGNESIUM 2.5  --  2.3  --   --    PHOSPHORUS  --   --  3.4  --   --          Lab 01/03/24  0334   TOTAL  PROTEIN 6.7   ALBUMIN 3.3*   GLOBULIN 3.4   ALT (SGPT) 32   AST (SGOT) 24   BILIRUBIN 0.4   ALK PHOS 61                         Brief Urine Lab Results  (Last result in the past 365 days)        Color   Clarity   Blood   Leuk Est   Nitrite   Protein   CREAT   Urine HCG        12/29/23 2057 Yellow   Clear   Negative   Trace   Negative   Negative                   Microbiology Results Abnormal       Procedure Component Value - Date/Time    Blood Culture - Blood, Arm, Left [[id number]]  (Normal) Collected: 12/29/23 2100    Lab Status: Final result Specimen: Blood from Arm, Left Updated: 01/03/24 2146     Blood Culture No growth at 5 days    Blood Culture - Blood, Hand, Left [[id number]]  (Normal) Collected: 12/29/23 2106    Lab Status: Final result Specimen: Blood from Hand, Left Updated: 01/03/24 2146     Blood Culture No growth at 5 days            No radiology results from the last 24 hrs    Results for orders placed during the hospital encounter of 12/27/23    Adult Transthoracic Echo Complete W/ Cont if Necessary Per Protocol (With Agitated Saline)    Interpretation Summary    Left ventricular systolic function is normal. Calculated left ventricular EF = 55.6%    Left ventricular diastolic function is consistent with (grade I) impaired relaxation.    Saline test results are negative.      Current medications:  Scheduled Meds:amLODIPine, 10 mg, Oral, Q24H  hydrALAZINE, 10 mg, Oral, Q12H  lisinopril, 20 mg, Oral, Q12H  metoprolol tartrate, 50 mg, Oral, Q12H  multivitamin pediatric chewable, 1 tablet, Oral, Daily  nicotine, 1 patch, Transdermal, Q24H  ProSource No Carb, 30 mL, Oral, BID  sodium chloride, 10 mL, Intravenous, Q12H  thiamine, 100 mg, Oral, Daily      Continuous Infusions:niCARdipine, 5-15 mg/hr, Last Rate: Stopped (01/07/24 0959)      PRN Meds:.  acetaminophen **OR** acetaminophen **OR** acetaminophen    Calcium Replacement - Follow Nurse / BPA Driven Protocol    dextrose    glucagon (human recombinant)     hydrALAZINE    hydrOXYzine    labetalol    Magnesium Standard Dose Replacement - Follow Nurse / BPA Driven Protocol    melatonin    Phosphorus Replacement - Follow Nurse / BPA Driven Protocol    Potassium Replacement - Follow Nurse / BPA Driven Protocol    sodium chloride    sodium chloride    temazepam    Assessment & Plan   Assessment & Plan     Active Hospital Problems    Diagnosis  POA    **Rt Thalamic ICH [I61.9]  Yes    Hypertension [I10]  Yes    Non-compliance [Z91.199]  Not Applicable    History of alcohol abuse [F10.11]  Yes      Resolved Hospital Problems   No resolved problems to display.        Brief Hospital Course to date:  Chano Rees is a 53 y.o. male with h/o HTN and poor medical compliance presented with sudden onset left sided weakness and headache with elevated BP.  CT showed 3cm right thalamic ICH felt to be d/t hypertensive bleed as CTA was unremarkable and was monitored initially in the ICU.  He had issues with encephalopathy and was treated with precedex infusion    Right Thalamic ICH  --CT showed 3cm right thalamic ICH  --CTA unremarkable  --felt to be d/t hypertensive bleed    HTN  --norvasc increased.  Continue lisinopril, metoprolol  --added hydralazine 10mg BID,  off cardene gtt since 1/7    H/o etoh abuse on chart BUT now patient and father both adamantly denying etoh use.  --s/p precedex gtt while in ICU  --continue thiamine, folate, MTV    Possible Aspiration Pna  --s/p zosyn    Insomnia  --patient with prolonged QT.  Not much help with hydroxyzine PRN.   --restoril prn--increased dose to 30mg qhs prn but wasn't give last nigh    Tobacco Abuse  --advise cessation, continue nicotine patch    D/w patient's girlfriend at bedside on 1/8/24    Expected Discharge Location and Transportation: awaiting rehab  Expected Discharge   Expected Discharge Date: 1/9/2024; Expected Discharge Time:      DVT prophylaxis:  Mechanical DVT prophylaxis orders are present.     AM-PAC 6 Clicks Score  (PT): 9 (24 0830)    CODE STATUS:   Code Status and Medical Interventions:   Ordered at: 238     Code Status (Patient has no pulse and is not breathing):    CPR (Attempt to Resuscitate)     Medical Interventions (Patient has pulse or is breathing):    Full Support       Ward Babb MD  24        Electronically signed by Ward Babb MD at 24 1009       Ward Babb MD at 24 1046              [facility] Medicine Services  PROGRESS NOTE    Patient Name: Chano Rees  : 1970  MRN: [id number]    Date of Admission: 2023  Primary Care Physician: Provider, No Known    Subjective   Subjective     CC:  ICH    HPI:  States that he feels good.  Reports that still not able to sleep because his long hair is so knotted that pulls his hair anytime he moves his head.      Objective   Objective     Vital Signs:   Temp:  [97.6 °F (36.4 °C)-98.2 °F (36.8 °C)] 97.6 °F (36.4 °C)  Heart Rate:  [68-90] 71  Resp:  [16-18] 16  BP: (123-157)/() 145/88     Physical Exam:  Constitutional: No acute distress, awake, alert, sitting up in bed  HENT: NCAT, mucous membranes moist  Respiratory: Clear to auscultation bilaterally, respiratory effort normal   Cardiovascular: RRR, no murmurs, rubs, or gallops  Gastrointestinal: Positive bowel sounds, soft, nontender, nondistended  Musculoskeletal: No bilateral ankle edema  Psychiatric: Appropriate affect, cooperative  Neurologic: Oriented x 3, LUE weakness, speech clear  Skin: No rashes      Results Reviewed:  LAB RESULTS:      Lab 24  0334 24  0425 24  0335 24  0530 24  0459   WBC 13.09* 13.24* 11.07* 11.33* 12.08*   HEMOGLOBIN 15.3 15.5 14.4 14.7 14.5   HEMATOCRIT 47.5 47.5 45.0 43.7 44.1   PLATELETS 291 271 227 201 232   NEUTROS ABS 9.53* 9.05* 7.55* 7.11* 8.41*   IMMATURE GRANS (ABS) 0.25* 0.25* 0.20* 0.39* 0.24*   LYMPHS ABS 1.62 1.95 1.34 1.56 1.28   MONOS ABS 1.33* 1.47*  1.41* 1.80* 1.81*   EOS ABS 0.27 0.43* 0.48* 0.40 0.27   MCV 90.8 92.1 92.6 91.6 91.1   CRP  --   --   --   --  8.97*   PROCALCITONIN  --   --  0.21  --   --          Lab 01/05/24  0334 01/04/24  0425 01/03/24  0335 01/02/24  0530 01/01/24  1756 01/01/24  0459   SODIUM 136 138 140 141  --  139   POTASSIUM 4.4 4.5 4.5 4.6 4.1 3.4*   CHLORIDE 102 104 107 107  --  107   CO2 22.0 23.0 22.0 19.0*  --  20.0*   ANION GAP 12.0 11.0 11.0 15.0  --  12.0   BUN 24* 26* 30* 30*  --  28*   CREATININE 1.21 1.17 0.97 1.15  --  1.18   EGFR 71.6 74.5 93.3 76.1  --  73.8   GLUCOSE 111* 107* 108* 103*  --  97   CALCIUM 9.3 9.4 9.0 9.1  --  8.6   MAGNESIUM 2.5  --  2.3  --   --  2.6   PHOSPHORUS  --   --  3.4  --   --  2.7         Lab 01/03/24  0335 01/01/24  0459   TOTAL PROTEIN 6.7 6.4   ALBUMIN 3.3* 3.2*   GLOBULIN 3.4 3.2   ALT (SGPT) 32 23   AST (SGOT) 24 26   BILIRUBIN 0.4 0.8   ALK PHOS 61 66             Lab 01/01/24  0459   CHOLESTEROL 156   TRIGLYCERIDES 110             Lab 01/01/24  0530   PH, ARTERIAL 7.425   PCO2, ARTERIAL 32.7*   PO2 ART 80.0*   FIO2 50   HCO3 ART 21.5   BASE EXCESS ART -2.1*   CARBOXYHEMOGLOBIN 1.3     Brief Urine Lab Results  (Last result in the past 365 days)        Color   Clarity   Blood   Leuk Est   Nitrite   Protein   CREAT   Urine HCG        12/29/23 2057 Yellow   Clear   Negative   Trace   Negative   Negative                   Microbiology Results Abnormal       Procedure Component Value - Date/Time    Blood Culture - Blood, Arm, Left [[id number]]  (Normal) Collected: 12/29/23 2100    Lab Status: Final result Specimen: Blood from Arm, Left Updated: 01/03/24 2146     Blood Culture No growth at 5 days    Blood Culture - Blood, Hand, Left [[id number]]  (Normal) Collected: 12/29/23 2106    Lab Status: Final result Specimen: Blood from Hand, Left Updated: 01/03/24 2146     Blood Culture No growth at 5 days            No radiology results from the last 24 hrs    Results for orders placed during the  hospital encounter of 12/27/23    Adult Transthoracic Echo Complete W/ Cont if Necessary Per Protocol (With Agitated Saline)    Interpretation Summary    Left ventricular systolic function is normal. Calculated left ventricular EF = 55.6%    Left ventricular diastolic function is consistent with (grade I) impaired relaxation.    Saline test results are negative.      Current medications:  Scheduled Meds:amLODIPine, 10 mg, Oral, Q24H  lisinopril, 20 mg, Oral, Q12H  metoprolol tartrate, 50 mg, Oral, Q12H  multivitamin pediatric chewable, 1 tablet, Oral, Daily  nicotine, 1 patch, Transdermal, Q24H  ProSource No Carb, 30 mL, Oral, BID  sodium chloride, 10 mL, Intravenous, Q12H  thiamine, 100 mg, Oral, Daily      Continuous Infusions:niCARdipine, 5-15 mg/hr, Last Rate: Stopped (01/07/24 0959)      PRN Meds:.  acetaminophen **OR** acetaminophen    Calcium Replacement - Follow Nurse / BPA Driven Protocol    dextrose    glucagon (human recombinant)    hydrALAZINE    hydrOXYzine    labetalol    Magnesium Standard Dose Replacement - Follow Nurse / BPA Driven Protocol    melatonin    Phosphorus Replacement - Follow Nurse / BPA Driven Protocol    Potassium Replacement - Follow Nurse / BPA Driven Protocol    sodium chloride    sodium chloride    temazepam    Assessment & Plan   Assessment & Plan     Active Hospital Problems    Diagnosis  POA    **Rt Thalamic ICH [I61.9]  Yes    Hypertension [I10]  Yes    Non-compliance [Z91.199]  Not Applicable    History of alcohol abuse [F10.11]  Yes      Resolved Hospital Problems   No resolved problems to display.        Brief Hospital Course to date:  Chano Rees is a 53 y.o. male with h/o HTN and poor medical compliance presented with sudden onset left sided weakness and headache with elevated BP.  CT showed 3cm right thalamic ICH felt to be d/t hypertensive bleed as CTA was unremarkable and was monitored initially in the ICU.  He had issues with encephalopathy and was treated with  precedex infusion    Right Thalamic ICH  --CT showed 3cm right thalamic ICH  --CTA unremarkable  --felt to be d/t hypertensive bleed    HTN  --norvasc increased.  Continue lisinopril, metoprolol  --add hydralazine 10mg BID, trying to wean off cardene gtt    H/o etoh abuse on chart BUT now patient and father both adamantly denying etoh use.  --s/p precedex gtt while in ICU  --continue thiamine, folate, MTV    Possible Aspiration Pna  --s/p zosyn    Insomnia  --patient with prolonged QT.  Not much help with hydroxyzine PRN.   --restoril prn    Tobacco Abuse  --advise cessation, continue nicotine patch    D/w patient's father at bedside on 24    Expected Discharge Location and Transportation: awaiting rehab  Expected Discharge   Expected Discharge Date: 2024; Expected Discharge Time:      DVT prophylaxis:  Mechanical DVT prophylaxis orders are present.     AM-PAC 6 Clicks Score (PT): 10 (24)    CODE STATUS:   Code Status and Medical Interventions:   Ordered at: 23 2218     Code Status (Patient has no pulse and is not breathing):    CPR (Attempt to Resuscitate)     Medical Interventions (Patient has pulse or is breathing):    Full Support       Ward Babb MD  24        Electronically signed by Ward Babb MD at 24 1049       Ward Babb MD at 24 1042              [facility] Medicine Services  PROGRESS NOTE    Patient Name: Chano Rees  : 1970  MRN: [id number]    Date of Admission: 2023  Primary Care Physician: Provider, No Known    Subjective   Subjective     CC:  ICH    HPI:  D/w father today, patient still having trouble sleeping.  Had a rough time last night going to sleep.  Was given melatonin and eventually went to sleep      Objective   Objective     Vital Signs:   Temp:  [98.3 °F (36.8 °C)-98.6 °F (37 °C)] 98.4 °F (36.9 °C)  Heart Rate:  [63-89] 63  Resp:  [18] 18  BP: (115-172)/() 126/84     Physical  Exam:  Constitutional: No acute distress, awake, alert, sitting up in bed, father at bedside  HENT: NCAT, mucous membranes moist  Respiratory: Clear to auscultation bilaterally, respiratory effort normal   Cardiovascular: RRR, no murmurs, rubs, or gallops  Gastrointestinal: Positive bowel sounds, soft, nontender, nondistended  Musculoskeletal: No bilateral ankle edema  Psychiatric: Appropriate affect, cooperative  Neurologic: Oriented x 3 but mildly confused/naked in bed and not covered completely, LUE weakness, speech clear  Skin: No rashes      Results Reviewed:  LAB RESULTS:      Lab 01/05/24  0334 01/04/24  0425 01/03/24  0335 01/02/24  0530 01/01/24  0459   WBC 13.09* 13.24* 11.07* 11.33* 12.08*   HEMOGLOBIN 15.3 15.5 14.4 14.7 14.5   HEMATOCRIT 47.5 47.5 45.0 43.7 44.1   PLATELETS 291 271 227 201 232   NEUTROS ABS 9.53* 9.05* 7.55* 7.11* 8.41*   IMMATURE GRANS (ABS) 0.25* 0.25* 0.20* 0.39* 0.24*   LYMPHS ABS 1.62 1.95 1.34 1.56 1.28   MONOS ABS 1.33* 1.47* 1.41* 1.80* 1.81*   EOS ABS 0.27 0.43* 0.48* 0.40 0.27   MCV 90.8 92.1 92.6 91.6 91.1   CRP  --   --   --   --  8.97*   PROCALCITONIN  --   --  0.21  --   --          Lab 01/05/24  0334 01/04/24  0425 01/03/24  0335 01/02/24  0530 01/01/24  1756 01/01/24  0459   SODIUM 136 138 140 141  --  139   POTASSIUM 4.4 4.5 4.5 4.6 4.1 3.4*   CHLORIDE 102 104 107 107  --  107   CO2 22.0 23.0 22.0 19.0*  --  20.0*   ANION GAP 12.0 11.0 11.0 15.0  --  12.0   BUN 24* 26* 30* 30*  --  28*   CREATININE 1.21 1.17 0.97 1.15  --  1.18   EGFR 71.6 74.5 93.3 76.1  --  73.8   GLUCOSE 111* 107* 108* 103*  --  97   CALCIUM 9.3 9.4 9.0 9.1  --  8.6   MAGNESIUM 2.5  --  2.3  --   --  2.6   PHOSPHORUS  --   --  3.4  --   --  2.7         Lab 01/03/24  0335 01/01/24  0459   TOTAL PROTEIN 6.7 6.4   ALBUMIN 3.3* 3.2*   GLOBULIN 3.4 3.2   ALT (SGPT) 32 23   AST (SGOT) 24 26   BILIRUBIN 0.4 0.8   ALK PHOS 61 66             Lab 01/01/24  0459   CHOLESTEROL 156   TRIGLYCERIDES 110              Lab 01/01/24  0530   PH, ARTERIAL 7.425   PCO2, ARTERIAL 32.7*   PO2 ART 80.0*   FIO2 50   HCO3 ART 21.5   BASE EXCESS ART -2.1*   CARBOXYHEMOGLOBIN 1.3     Brief Urine Lab Results  (Last result in the past 365 days)        Color   Clarity   Blood   Leuk Est   Nitrite   Protein   CREAT   Urine HCG        12/29/23 2057 Yellow   Clear   Negative   Trace   Negative   Negative                   Microbiology Results Abnormal       Procedure Component Value - Date/Time    Blood Culture - Blood, Arm, Left [[id number]]  (Normal) Collected: 12/29/23 2100    Lab Status: Final result Specimen: Blood from Arm, Left Updated: 01/03/24 2146     Blood Culture No growth at 5 days    Blood Culture - Blood, Hand, Left [[id number]]  (Normal) Collected: 12/29/23 2106    Lab Status: Final result Specimen: Blood from Hand, Left Updated: 01/03/24 2146     Blood Culture No growth at 5 days            No radiology results from the last 24 hrs    Results for orders placed during the hospital encounter of 12/27/23    Adult Transthoracic Echo Complete W/ Cont if Necessary Per Protocol (With Agitated Saline)    Interpretation Summary    Left ventricular systolic function is normal. Calculated left ventricular EF = 55.6%    Left ventricular diastolic function is consistent with (grade I) impaired relaxation.    Saline test results are negative.      Current medications:  Scheduled Meds:amLODIPine, 10 mg, Oral, Q24H  lisinopril, 20 mg, Oral, Q12H  metoprolol tartrate, 50 mg, Oral, Q12H  multivitamin pediatric chewable, 1 tablet, Oral, Daily  nicotine, 1 patch, Transdermal, Q24H  ProSource No Carb, 30 mL, Oral, BID  sodium chloride, 10 mL, Intravenous, Q12H  thiamine, 100 mg, Oral, Daily      Continuous Infusions:niCARdipine, 5-15 mg/hr  niCARdipine, 5-15 mg/hr, Last Rate: 2.5 mg/hr (01/06/24 1006)      PRN Meds:.  acetaminophen **OR** acetaminophen    Calcium Replacement - Follow Nurse / BPA Driven Protocol    dextrose    glucagon (human  recombinant)    hydrALAZINE    hydrOXYzine    labetalol    Magnesium Standard Dose Replacement - Follow Nurse / BPA Driven Protocol    melatonin    Phosphorus Replacement - Follow Nurse / BPA Driven Protocol    Potassium Replacement - Follow Nurse / BPA Driven Protocol    sodium chloride    sodium chloride    Assessment & Plan   Assessment & Plan     Active Hospital Problems    Diagnosis  POA    **Rt Thalamic ICH [I61.9]  Yes    Hypertension [I10]  Yes    Non-compliance [Z91.199]  Not Applicable    History of alcohol abuse [F10.11]  Yes      Resolved Hospital Problems   No resolved problems to display.        Brief Hospital Course to date:  Chano Rees is a 53 y.o. male with h/o HTN and poor medical compliance presented with sudden onset left sided weakness and headache with elevated BP.  CT showed 3cm right thalamic ICH felt to be d/t hypertensive bleed as CTA was unremarkable and was monitored initially in the ICU.  He had issues with encephalopathy and was treated with precedex infusion    Right Thalamic ICH  --CT showed 3cm right thalamic ICH  --CTA unremarkable  --felt to be d/t hypertensive bleed    HTN  --norvasc increased.  Continue lisinopril, metoprolol  --BP much better today, trying to wean off cardene gtt    H/o etoh abuse on chart BUT now patient and father both adamantly denying etoh use.  --s/p precedex gtt while in ICU  --continue thiamine, folate, MTV    Possible Aspiration Pna  --s/p zosyn    Insomnia  --patient with prolonged QT.  Not much help with hydroxyzine PRN.   --start restoril prn    Tobacco Abuse  --advise cessation, continue nicotine patch    D/w patient's father at bedside on 1/6/24    Expected Discharge Location and Transportation: awaiting rehab  Expected Discharge   Expected Discharge Date: 1/8/2024; Expected Discharge Time:      DVT prophylaxis:  Mechanical DVT prophylaxis orders are present.     AM-PAC 6 Clicks Score (PT): 10 (01/04/24 2000)    CODE STATUS:   Code Status and  Medical Interventions:   Ordered at: 23 2218     Code Status (Patient has no pulse and is not breathing):    CPR (Attempt to Resuscitate)     Medical Interventions (Patient has pulse or is breathing):    Full Support       Ward Babb MD  24        Electronically signed by Ward Babb MD at 24 1050       Ward Babb MD at 24 1227              [facility] Medicine Services  PROGRESS NOTE    Patient Name: Chano Rees  : 1970  MRN: [id number]    Date of Admission: 2023  Primary Care Physician: Provider, No Known    Subjective   Subjective     CC:  ICH    HPI:  ICU transfer today.  Still confused per dad at bedside.  Father at bedside states that patient is a jokester and was joking about drugs/etoh on admission and states that he does not do drugs or drink etoh.  States that patient became more confused with precedex?      Objective   Objective     Vital Signs:   Temp:  [97.9 °F (36.6 °C)-98.5 °F (36.9 °C)] 98 °F (36.7 °C)  Heart Rate:  [69-91] 80  Resp:  [18-20] 18  BP: (130-165)/() 138/76     Physical Exam:  Constitutional: No acute distress, awake, alert, sitting up in bed, father at bedside  HENT: NCAT, mucous membranes moist  Respiratory: Clear to auscultation bilaterally, respiratory effort normal   Cardiovascular: RRR, no murmurs, rubs, or gallops  Gastrointestinal: Positive bowel sounds, soft, nontender, nondistended  Musculoskeletal: No bilateral ankle edema  Psychiatric: Appropriate affect, cooperative  Neurologic: Oriented x 3 but mildly confused/naked in bed and not covered completely, LUE weakness, speech clear  Skin: No rashes      Results Reviewed:  LAB RESULTS:      Lab 24  0334 24  0425 24  0335 24  0530 24  0459 23  0405 23  0354 23  0108 23  2200   WBC 13.09* 13.24* 11.07* 11.33* 12.08*   < > 14.97*  --   --    HEMOGLOBIN 15.3 15.5 14.4 14.7 14.5   < > 14.9  --    --    HEMATOCRIT 47.5 47.5 45.0 43.7 44.1   < > 44.6  --   --    PLATELETS 291 271 227 201 232   < > 256  --   --    NEUTROS ABS 9.53* 9.05* 7.55* 7.11* 8.41*   < > 10.99*  --   --    IMMATURE GRANS (ABS) 0.25* 0.25* 0.20* 0.39* 0.24*   < > 0.15*  --   --    LYMPHS ABS 1.62 1.95 1.34 1.56 1.28   < > 1.48  --   --    MONOS ABS 1.33* 1.47* 1.41* 1.80* 1.81*   < > 2.27*  --   --    EOS ABS 0.27 0.43* 0.48* 0.40 0.27   < > 0.02  --   --    MCV 90.8 92.1 92.6 91.6 91.1   < > 91.4  --   --    CRP  --   --   --   --  8.97*  --   --   --   --    PROCALCITONIN  --   --  0.21  --   --   --   --   --   --    LACTATE  --   --   --   --   --   --  1.6 2.2* 3.9*    < > = values in this interval not displayed.         Lab 01/05/24 0334 01/04/24  0425 01/03/24  0335 01/02/24  0530 01/01/24  1756 01/01/24  0459 12/31/23  0405 12/30/23 0457   SODIUM 136 138 140 141  --  139   < > 139   POTASSIUM 4.4 4.5 4.5 4.6 4.1 3.4*   < > 4.2   CHLORIDE 102 104 107 107  --  107   < > 106   CO2 22.0 23.0 22.0 19.0*  --  20.0*   < > 21.0*   ANION GAP 12.0 11.0 11.0 15.0  --  12.0   < > 12.0   BUN 24* 26* 30* 30*  --  28*   < > 25*   CREATININE 1.21 1.17 0.97 1.15  --  1.18   < > 1.40*   EGFR 71.6 74.5 93.3 76.1  --  73.8   < > 60.1   GLUCOSE 111* 107* 108* 103*  --  97   < > 109*   CALCIUM 9.3 9.4 9.0 9.1  --  8.6   < > 8.8   MAGNESIUM 2.5  --  2.3  --   --  2.6  --  1.9   PHOSPHORUS  --   --  3.4  --   --  2.7  --  4.7*    < > = values in this interval not displayed.         Lab 01/03/24  0335 01/01/24  0459 12/30/23  0457   TOTAL PROTEIN 6.7 6.4  --    ALBUMIN 3.3* 3.2* 3.6   GLOBULIN 3.4 3.2  --    ALT (SGPT) 32 23  --    AST (SGOT) 24 26  --    BILIRUBIN 0.4 0.8  --    ALK PHOS 61 66  --              Lab 01/01/24  0459   CHOLESTEROL 156   TRIGLYCERIDES 110             Lab 01/01/24  0530 12/29/23  2042   PH, ARTERIAL 7.425 7.545*   PCO2, ARTERIAL 32.7* 25.3*   PO2 ART 80.0* 59.3*   FIO2 50 28   HCO3 ART 21.5 21.9   BASE EXCESS ART -2.1* 1.2    CARBOXYHEMOGLOBIN 1.3 1.5     Brief Urine Lab Results  (Last result in the past 365 days)        Color   Clarity   Blood   Leuk Est   Nitrite   Protein   CREAT   Urine HCG        12/29/23 2057 Yellow   Clear   Negative   Trace   Negative   Negative                   Microbiology Results Abnormal       Procedure Component Value - Date/Time    Blood Culture - Blood, Arm, Left [[id number]]  (Normal) Collected: 12/29/23 2100    Lab Status: Final result Specimen: Blood from Arm, Left Updated: 01/03/24 2146     Blood Culture No growth at 5 days    Blood Culture - Blood, Hand, Left [[id number]]  (Normal) Collected: 12/29/23 2106    Lab Status: Final result Specimen: Blood from Hand, Left Updated: 01/03/24 2146     Blood Culture No growth at 5 days            No radiology results from the last 24 hrs    Results for orders placed during the hospital encounter of 12/27/23    Adult Transthoracic Echo Complete W/ Cont if Necessary Per Protocol (With Agitated Saline)    Interpretation Summary    Left ventricular systolic function is normal. Calculated left ventricular EF = 55.6%    Left ventricular diastolic function is consistent with (grade I) impaired relaxation.    Saline test results are negative.      Current medications:  Scheduled Meds:amLODIPine, 10 mg, Oral, Q24H  lisinopril, 20 mg, Oral, Q24H  metoprolol tartrate, 50 mg, Oral, Q12H  multivitamin pediatric chewable, 1 tablet, Oral, Daily  nicotine, 1 patch, Transdermal, Q24H  piperacillin-tazobactam, 4.5 g, Intravenous, Q8H  ProSource No Carb, 30 mL, Oral, BID  sodium chloride, 10 mL, Intravenous, Q12H  thiamine, 100 mg, Oral, Daily      Continuous Infusions:niCARdipine, 5-15 mg/hr, Last Rate: 10 mg/hr (01/05/24 0819)      PRN Meds:.  acetaminophen **OR** acetaminophen    Calcium Replacement - Follow Nurse / BPA Driven Protocol    dextrose    diphenhydrAMINE    glucagon (human recombinant)    hydrOXYzine    labetalol    Magnesium Standard Dose Replacement - Follow  Nurse / BPA Driven Protocol    Phosphorus Replacement - Follow Nurse / BPA Driven Protocol    Potassium Replacement - Follow Nurse / BPA Driven Protocol    sodium chloride    sodium chloride    Assessment & Plan   Assessment & Plan     Active Hospital Problems    Diagnosis  POA    **Rt Thalamic ICH [I61.9]  Yes    Hypertension [I10]  Yes    Non-compliance [Z91.199]  Not Applicable    History of alcohol abuse [F10.11]  Yes      Resolved Hospital Problems   No resolved problems to display.        Brief Hospital Course to date:  Chano Rees is a 53 y.o. male with h/o HTN and poor medical compliance presented with sudden onset left sided weakness and headache with elevated BP.  CT showed 3cm right thalamic ICH felt to be d/t hypertensive bleed as CTA was unremarkable and was monitored initially in the ICU.  He had issues with encephalopathy and was treated with precedex infusion    Right Thalamic ICH  --CT showed 3cm right thalamic ICH  --CTA unremarkable  --felt to be d/t hypertensive bleed    HTN  --norvasc increased today.  Continue lisinopril, metoprolol  --still requiring cardene gtt    H/o etoh abuse on chart BUT now patient and father both adamantly denying etoh use.  --s/p precedex gtt while in ICU  --continue thiamine, folate, MTV    Possible Aspiration Pna  --s/p zosyn    Insomnia  --patient with prolonged QT.  Start hydroxyzine PRN.  Stop the benadryl that was ordered    Tobacco Abuse  --advise cessation, continue nicotine patch    D/w patient's father at bedside on 1/5/24    Expected Discharge Location and Transportation: awaiting rehab  Expected Discharge   Expected Discharge Date: 1/8/2024; Expected Discharge Time:      DVT prophylaxis:  Mechanical DVT prophylaxis orders are present.     AM-PAC 6 Clicks Score (PT): 10 (01/04/24 2000)    CODE STATUS:   Code Status and Medical Interventions:   Ordered at: 12/27/23 1628     Code Status (Patient has no pulse and is not breathing):    CPR (Attempt to  Resuscitate)     Medical Interventions (Patient has pulse or is breathing):    Full Support       Ward Babb MD  24        Electronically signed by Ward Babb MD at 24 1247       Consult Notes (last 5 days)  Notes from 24 1551 through 24 1551   No notes of this type exist for this encounter.          Discharge Summary        aWrd Babb MD at 24 1124              [facility]  DISCHARGE SUMMARY    Patient Name: Chano Rees  : 1970  MRN: [id number]    Date of Admission: 2023  9:01 PM  Date of Discharge:  2024  Primary Care Physician: Provider, No Known    Consults       Date and Time Order Name Status Description    2023  9:02 PM Inpatient Neurology Consult Stroke Completed             Hospital Course     Presenting Problem:     Active Hospital Problems    Diagnosis  POA    **Rt Thalamic ICH [I61.9]  Yes    Hypertension [I10]  Yes    Non-compliance [Z91.199]  Not Applicable    History of alcohol abuse [F10.11]  Yes      Resolved Hospital Problems   No resolved problems to display.          Hospital Course:  Chano Rees is a 53 y.o. male  with h/o HTN and poor medical compliance presented with sudden onset left sided weakness and headache with elevated BP.  CT showed 3cm right thalamic ICH felt to be d/t hypertensive bleed as CTA was unremarkable and was monitored initially in the ICU.  He had issues with encephalopathy and was treated with precedex infusion     Right Thalamic ICH  --CT showed 3cm right thalamic ICH.  Stable on repeat CT head last on 23  --CTA unremarkable  --felt to be d/t hypertensive bleed     HTN  --norvasc increased.  Continue lisinopril, metoprolol  --added hydralazine 10mg BID,  off cardene gtt since      H/o etoh abuse on chart BUT now patient and father both adamantly denying etoh use.  --s/p precedex gtt while in ICU  --continue thiamine, folate, MTV     Possible  Aspiration Pna  --s/p zosyn    Dysphagia  --continue honey thick liquids.  May repeat MBS at [facility]     Insomnia  --patient with prolonged QT.  Not much help with hydroxyzine PRN.   --restoril prn     Tobacco Abuse  --advised cessation, continue nicotine patch      Discharge Follow Up Recommendations for outpatient labs/diagnostics:   F/u with PCP 1 week after discharged from rehab  F/u with NS with repeat CT head to follow ICH  F/u with stroke neuro    Day of Discharge     HPI:   Still didn't sleep well last night but wasn't given restoril.  BP doing ok off cardene gtt since yesterday per nurse.      Review of Systems  Gen- No fevers, chills  CV- No chest pain, palpitations  Resp- No cough, dyspnea  GI- No N/V/D, abd pain      Vital Signs:   Temp:  [98 °F (36.7 °C)-98.4 °F (36.9 °C)] 98.1 °F (36.7 °C)  Heart Rate:  [63-80] 79  Resp:  [16-18] 18  BP: (123-177)/() 141/93      Physical Exam:  Constitutional: No acute distress, awake, alert, girlfriend at bedside  HENT: NCAT, mucous membranes moist  Respiratory: Clear to auscultation bilaterally, respiratory effort normal   Cardiovascular: RRR, no murmurs, rubs, or gallops  Gastrointestinal: Positive bowel sounds, soft, nontender, nondistended  Musculoskeletal: No bilateral ankle edema  Psychiatric: Appropriate affect, cooperative  Neurologic: Oriented x 3, LUE weakness,  speech clear  Skin: No rashes      Pertinent  and/or Most Recent Results     LAB RESULTS:      Lab 01/05/24  0334 01/04/24  0425 01/03/24  0335 01/02/24  0530   WBC 13.09* 13.24* 11.07* 11.33*   HEMOGLOBIN 15.3 15.5 14.4 14.7   HEMATOCRIT 47.5 47.5 45.0 43.7   PLATELETS 291 271 227 201   NEUTROS ABS 9.53* 9.05* 7.55* 7.11*   IMMATURE GRANS (ABS) 0.25* 0.25* 0.20* 0.39*   LYMPHS ABS 1.62 1.95 1.34 1.56   MONOS ABS 1.33* 1.47* 1.41* 1.80*   EOS ABS 0.27 0.43* 0.48* 0.40   MCV 90.8 92.1 92.6 91.6   PROCALCITONIN  --   --  0.21  --          Lab 01/05/24  0334 01/04/24  0425 01/03/24  0335  01/02/24  0530 01/01/24  1756   SODIUM 136 138 140 141  --    POTASSIUM 4.4 4.5 4.5 4.6 4.1   CHLORIDE 102 104 107 107  --    CO2 22.0 23.0 22.0 19.0*  --    ANION GAP 12.0 11.0 11.0 15.0  --    BUN 24* 26* 30* 30*  --    CREATININE 1.21 1.17 0.97 1.15  --    EGFR 71.6 74.5 93.3 76.1  --    GLUCOSE 111* 107* 108* 103*  --    CALCIUM 9.3 9.4 9.0 9.1  --    MAGNESIUM 2.5  --  2.3  --   --    PHOSPHORUS  --   --  3.4  --   --          Lab 01/03/24  0335   TOTAL PROTEIN 6.7   ALBUMIN 3.3*   GLOBULIN 3.4   ALT (SGPT) 32   AST (SGOT) 24   BILIRUBIN 0.4   ALK PHOS 61                     Brief Urine Lab Results  (Last result in the past 365 days)        Color   Clarity   Blood   Leuk Est   Nitrite   Protein   CREAT   Urine HCG        12/29/23 2057 Yellow   Clear   Negative   Trace   Negative   Negative                 Microbiology Results (last 10 days)       Procedure Component Value - Date/Time    Blood Culture - Blood, Hand, Left [[id number]]  (Normal) Collected: 12/29/23 2106    Lab Status: Final result Specimen: Blood from Hand, Left Updated: 01/03/24 2146     Blood Culture No growth at 5 days    Blood Culture - Blood, Arm, Left [[id number]]  (Normal) Collected: 12/29/23 2100    Lab Status: Final result Specimen: Blood from Arm, Left Updated: 01/03/24 2146     Blood Culture No growth at 5 days            SLP FEES - Fiberoptic Endo Eval Swallow    Result Date: 1/3/2024  This procedure was auto-finalized with no dictation required.    XR Chest 1 View    Result Date: 1/2/2024  XR CHEST 1 VW Date of Exam: 1/2/2024 4:02 AM EST Indication: Hypoxia Comparison: On 124 Findings: Stable cardiomegaly. Esophagogastric tube tip below the diaphragm. Right upper extremity PICC tip is at the cavoatrial junction. Lungs are hypoinflated but clear. No pneumothorax. No pleural effusion.     Impression: 1. Stable lines and support tubes. 2. Hypoinflated lungs without acute process. Electronically Signed: Nehemias Nixon MD  1/2/2024 7:22 AM EST   Workstation ID: [id number]    XR Chest 1 View    Result Date: 1/1/2024  XR CHEST 1 VW Date of Exam: 1/1/2024 4:13 AM EST Indication: Pulmonary edema Comparison: 12/31/2023 Findings: PICC line and feeding tube appear to be in satisfactory position. Heart is enlarged. Vasculature appears mildly cephalized. Mild coarsening of the pulmonary interstitial markings is stable. No lung consolidation, effusion or pneumothorax is seen.     Impression: No new chest disease. Electronically Signed: Solomon Carbone MD  1/1/2024 8:47 AM EST  Workstation ID: [id number]    XR Chest 1 View    Result Date: 12/31/2023  XR CHEST 1 VW Date of Exam: 12/31/2023 3:10 AM EST Indication: Hypoxia Comparison: Chest x-ray 12/29/2023 Findings: Redemonstration of right upper extremity PICC line. Interval placement of enteric feeding tube which courses in the stomach with the tip beyond the field-of-view. Stable cardiomegaly. The left costophrenic angle is included from the field-of-view. There are vague interstitial opacities bilaterally similar to prior. No definite pleural effusion. No pneumothorax.     Impression: Placement of enteric feeding tube which courses into the stomach with the tip beyond the field-of-view. Vague bilateral interstitial opacities suspicious for atypical/viral infection. Electronically Signed: Alex Nieto MD  12/31/2023 8:05 AM EST  Workstation ID: [id number]    CT Head Without Contrast    Result Date: 12/31/2023  CT HEAD WO CONTRAST Date of Exam: 12/31/2023 5:35 AM EST Indication: Stroke, follow up. Comparison: 12/29/2023 Technique: Axial CT images were obtained of the head without contrast administration.  Automated exposure control and iterative construction methods were used. Findings: There is a stable intraparenchymal hemorrhage in the right basal ganglia. This measures up to 3.2 x 2.1 x 4.0 cm (approximately 13 cc) and extends into the right lateral ventricle. There is trace intraventricular hemorrhage, which is slightly  decreased compared to the prior study. There is no new hemorrhage. There is edema surrounding the right basal ganglia hemorrhage with mild mass effect and 3 mm leftward shift of midline. There is no evidence of herniation or acute hydrocephalus. There is mild stable underlying chronic small vessel ischemic change. No new hypoattenuating lesions in the brain. There are mild intracranial vascular calcifications. There is mild right maxillary sinus mucosal disease. The mastoids are clear. The calvarium is intact. Orbits are unremarkable. Superficial soft tissues appear within normal limits.     Impression: 1.Stable right basal ganglia intraparenchymal hemorrhage extending into the right lateral ventricle. 2.Stable mild mass effect with 3 mm leftward shift of midline. No herniation or acute hydrocephalus. 3.No new hemorrhage. 4.Mild chronic small vessel ischemic change. Electronically Signed: Tal Meier MD  12/31/2023 6:00 AM EST  Workstation ID: [id number]    XR Abdomen KUB    Result Date: 12/29/2023  XR ABDOMEN KUB Date of Exam: 12/29/2023 9:30 PM EST Indication: NG placement Comparison: None available. Findings: Weighted tip feeding catheter tip overlies the duodenal/jejunal junction. Visualized bowel gas pattern is nonobstructive.     Impression: Weighted tip feeding catheter tip overlies the duodenal/jejunal junction. Electronically Signed: Victor M Guevara MD  12/29/2023 10:34 PM EST  Workstation ID: [id number]    XR Chest 1 View    Result Date: 12/29/2023  XR CHEST 1 VW Date of Exam: 12/29/2023 8:21 PM EST Indication: fever Comparison: 12/27/2023 Findings: Cardiothymic silhouette appears enlarged as on the prior study. There is cephalization of the vasculature. Peribronchial thickening appears increased, and there is persistent, mildly increased patchy interstitial disease in the left lung base that may represent developing bronchitis or pneumonia. No dense lung consolidation, effusion or pneumothorax is seen.      Impression: 1. Mildly increased patchy left basilar interstitial changes, potentially a developing pneumonia. Increased peribronchial thickening potentially bronchitis. 2. Cardiomegaly and mild pulmonary venous hypertension. Electronically Signed: Solomon Carbone MD  12/29/2023 8:58 PM EST  Workstation ID: [id number]    CT Head Without Contrast    Result Date: 12/29/2023  CT HEAD WO CONTRAST Date of Exam: 12/29/2023 7:12 PM EST Indication: Neuro deficit, acute, stroke suspected AMS, aphasia, f/u ICH. Comparison: Head CT same date timed 00:28. Technique: Axial CT images were obtained of the head without contrast administration.  Automated exposure control and iterative construction methods were used. Findings: Similar size and morphology of the hemorrhage centered within the right thalamus measuring around 3 cm in maximum dimension. Similar thin rim of surrounding edema. Similar 3 mm of midline shift. Similar trace blood products layering within the posterior horns of the lateral ventricles. No new or enlarging intracranial hemorrhage. No new mass effect. Paranasal sinuses and mastoid air cells are clear. No acute or suspicious soft tissue or osseous lesion.     Impression: No significant change with similar 3 cm right thalamic hemorrhage, 3 mm midline shift, and trace intraventricular blood products. Electronically Signed: Victor M Guevara MD  12/29/2023 7:50 PM EST  Workstation ID: [id number]    CT Head Without Contrast    Result Date: 12/29/2023  CT HEAD WO CONTRAST Date of Exam: 12/29/2023 12:22 AM EST Indication: Anisocoria Drowsiness. Comparison: CT scan of the head dated December 28, 2023 at 11:56 a.m. Technique: Axial CT images were obtained of the head without contrast administration.  Automated exposure control and iterative construction methods were used. Findings: There is been no significant change in the acute right thalamic hemorrhage. Maximum diameter is about 3 cm. Small amount of intraventricular hemorrhage  is seen in the posterior horn the right lateral ventricle, unchanged. The degree of right to left shift is stable at about 3 mm. Iodinated contrast is again identified in the cerebral arteries. The paranasal sinuses are clear. There are no new areas of hemorrhage or abnormal extra-axial fluid collections.     Impression: Stable CT head. Stable 3 cm right thalamic hemorrhage with right to left shift of 3 mm. Electronically Signed: Christoph Richardson MD  12/29/2023 1:12 AM EST  Workstation ID: [id number]    MRI Brain Without Contrast    Result Date: 12/28/2023  MRI BRAIN WO CONTRAST Date of Exam: 12/28/2023 10:06 PM EST Indication: Stroke, follow up.  Comparison: Same day head CT. Technique:  Routine multiplanar/multisequence sequence images of the brain were obtained without contrast administration. Please note this examination is significantly motion degraded. Findings: Within the confines of motion artifact as above, similar size of the right thalamic hemorrhage measuring around 3 cm in maximum dimension. Similar small volume layering blood products in the right lateral ventricle. Ventricular caliber is unchanged. Similar 2 to 3 mm of midline shift. No discrete evidence of restricted diffusion otherwise to suggest acute/subacute infarction.     Impression: Please note this examination is significantly motion degraded. Within these confines, there is overall similar size/volume of the right thalamic hemorrhage and small volume intraventricular blood products in the right ventricle. Similar 2-3 mm midline shift. No gross evidence of new acute intracranial process otherwise, though majority of sequences are nondiagnostic. Electronically Signed: Victor M Guevara MD  12/28/2023 11:19 PM EST  Workstation ID: [id number]    CT Head Without Contrast    Result Date: 12/28/2023  CT HEAD WO CONTRAST Date of Exam: 12/28/2023 11:55 AM EST Indication: Stroke, follow up. Comparison: 12/28/2023 Technique: Axial CT images were obtained of the  head without contrast administration.  Automated exposure control and iterative construction methods were used. Findings: No significant change in right thalamic acute hemorrhage measuring 3 cm in longest diameter. There is redemonstration of a small amount of intraventricular hemorrhage within the posterior horn of the right lateral ventricle. Mild right to left midline shift is again identified of approximately 2 to 3 mm. Residual contrast within the cerebral arteries. Orbits paranasal sinuses and mastoid air cells are unremarkable. Vascular calcifications are identified.     Impression: 1. No significant change in right thalamic hemorrhage measuring around 3 cm in diameter with mild right to left midline shift. Electronically Signed: Tj Tyson MD  12/28/2023 12:12 PM EST  Workstation ID: [id number]             Results for orders placed during the hospital encounter of 12/27/23    Adult Transthoracic Echo Complete W/ Cont if Necessary Per Protocol (With Agitated Saline)    Interpretation Summary    Left ventricular systolic function is normal. Calculated left ventricular EF = 55.6%    Left ventricular diastolic function is consistent with (grade I) impaired relaxation.    Saline test results are negative.      Plan for Follow-up of Pending Labs/Results:     Discharge Details        Discharge Medications        New Medications        Instructions Start Date   amLODIPine 10 MG tablet  Commonly known as: NORVASC   10 mg, Oral, Every 24 Hours Scheduled   Start Date: January 9, 2024     hydrALAZINE 10 MG tablet  Commonly known as: APRESOLINE   10 mg, Oral, Every 12 Hours Scheduled      lisinopril 20 MG tablet  Commonly known as: PRINIVIL,ZESTRIL   40 mg, Oral, Daily      metoprolol tartrate 50 MG tablet  Commonly known as: LOPRESSOR   50 mg, Oral, Every 12 Hours Scheduled      multivitamin pediatric chewable chewable tablet   1 tablet, Oral, Daily   Start Date: January 9, 2024     nicotine 21 MG/24HR patch  Commonly  known as: NICODERM CQ   1 patch, Transdermal, Every 24 Hours Scheduled   Start Date: January 9, 2024     temazepam 30 MG capsule  Commonly known as: RESTORIL   30 mg, Oral, Nightly PRN      thiamine 100 MG tablet  Commonly known as: VITAMIN B1   100 mg, Oral, Daily   Start Date: January 9, 2024              Allergies   Allergen Reactions    Shellfish-Derived Products Hives         Discharge Disposition:  Rehab Facility or Unit (DC - External)    Diet:  Hospital:  Diet Order   Procedures    Diet: Cardiac Diets; Healthy Heart (2-3 Na+); No Mixed Consistencies; Texture: Regular Texture (IDDSI 7); Fluid Consistency: Honey Thick            Activity:      Restrictions or Other Recommendations:         CODE STATUS:    Code Status and Medical Interventions:   Ordered at: 12/27/23 8602     Code Status (Patient has no pulse and is not breathing):    CPR (Attempt to Resuscitate)     Medical Interventions (Patient has pulse or is breathing):    Full Support       No future appointments.    Additional Instructions for the Follow-ups that You Need to Schedule       Discharge Follow-up with PCP   As directed       Currently Documented PCP:    Provider, No Known    PCP Phone Number:    None     Follow Up Details: with PCP 1 week after discharge from rehab.  Please have CM arrange PCP if patient doesn't have one        Discharge Follow-up with Specified Provider: with NS   As directed      To: with NS   Follow Up Details: with repeat CT to f/u ICH        Discharge Follow-up with Specified Provider: with stroke neuro; 2 Months   As directed      To: with stroke neuro   Follow Up: 2 Months                      Ward Babb MD  01/08/24      Time Spent on Discharge:  I spent  40  minutes on this discharge activity which included: face-to-face encounter with the patient, reviewing the data in the system, coordination of the care with the nursing staff as well as consultants, documentation, and entering orders.             Electronically signed by Ward Murillo MD at 01/08/24 1134       Discharge Order (From admission, onward)       Start     Ordered    01/08/24 1118  Discharge patient  Once        Expected Discharge Date: 01/08/24   Discharge Disposition: Rehab Facility or Unit (DC - External)   Physician of Record for Attribution - Please select from Treatment Team: WARD MURILLO [[id number]]   Review needed by CMO to determine Physician of Record: No      Question Answer Comment   Physician of Record for Attribution - Please select from Treatment Team WARD MURILLO    Review needed by CMO to determine Physician of Record No        01/08/24 1122

## 2024-01-10 NOTE — PROGRESS NOTES
"Enter Query Response Below      Query Response: Neither brain compression or cerebral edema are clinically significant.             If applicable, please update the problem list.   Patient: Chano Rees        : 1970  Account: [id number]           Admit Date:         How to Respond to this query:       a. Click New Note     b. Answer query within the yellow box.                c. Update the Problem List, if applicable.      If you have any questions about this query contact me at: [email or web address]      ,     53-year male patient with PMHx that includes Hypertension (noncompliance with medications) admitted 23 with Acute Right Basial Ganglia Intracerebral Hemorrhage with mild mass effect secondary to Hypertension, Hypertensive Emergency.      Pulmonology Progress Note states, \" CT head this morning without significant change in the right thalamic hemorrhage with some mild midline shift.  Remains on hypertonic saline.  NIH is 16\"    23 Head CT:  \"3.0 x 3.0 x 2.3 cm parenchymal hemorrhage centered in the right thalamus. Mild surrounding edema with mild regional mass effect without overt midline. ....\"      @ 05:05 Head CT: Very slight increase in the size of the right thalamic hemorrhage 3.2 cm in diameter previously 3 cm with 2 mm of right to left midline shift and a trace amount of posterior horn right lateral ventricle intraventricular hemorrhage.     CT of Head findings include \"There is a stable intraparenchymal hemorrhage in the right basal ganglia. This measures up to 3.2 x 2.1 x 4.0 cm (approximately 13 cc) and extends into the right lateral ventricle. There is trace intraventricular hemorrhage, which is slightly decreased compared to the prior study\"    Treatment has included IV Labetalol, IV Cardene infusion, Lisinopril, Lopressor, IV Lasix, IV Hydralazine x1, IV Solu-Medrol x1,   3% (Hypertonic) Saline IV infusion, Normal Saline at 75ml/hour. "     Please clarify the following:  - clinically significant brain compression as evidenced by midline shift and cerebral edema  - clinically significant brain compression as evidenced by midline shift, cerebral edema not clinically significant  - brain compression not clinically significant, clinically significant cerebral edema  - neither brain compression nor cerebral edema are clinically significant  - other _________________________  - clinically indeterminable    By submitting this query, we are merely seeking further clarification of documentation to accurately reflect all conditions that you are monitoring, evaluating, treating or that extend the hospitalization or utilize additional resources of care. Please utilize your independent clinical judgment when addressing the question(s) above.     This query and your response, once completed, will be entered into the legal medical record.    Sincerely,  Kailey Gil RN, CCDS  Clinical Documentation Integrity Program   [email or web address]